# Patient Record
Sex: FEMALE | Race: BLACK OR AFRICAN AMERICAN | Employment: FULL TIME | URBAN - METROPOLITAN AREA
[De-identification: names, ages, dates, MRNs, and addresses within clinical notes are randomized per-mention and may not be internally consistent; named-entity substitution may affect disease eponyms.]

---

## 2017-02-13 RX ORDER — LEVONORGESTREL AND ETHINYL ESTRADIOL 0.15-0.03
KIT ORAL
Qty: 91 TAB | Refills: 1 | Status: SHIPPED | OUTPATIENT
Start: 2017-02-13 | End: 2017-04-24 | Stop reason: ALTCHOICE

## 2017-03-15 RX ORDER — METFORMIN HYDROCHLORIDE 500 MG/1
TABLET, EXTENDED RELEASE ORAL
Qty: 60 TAB | Refills: 6 | Status: SHIPPED | OUTPATIENT
Start: 2017-03-15 | End: 2017-04-24 | Stop reason: ALTCHOICE

## 2017-04-24 ENCOUNTER — OFFICE VISIT (OUTPATIENT)
Dept: FAMILY MEDICINE CLINIC | Age: 32
End: 2017-04-24

## 2017-04-24 VITALS
TEMPERATURE: 98.2 F | HEIGHT: 61 IN | DIASTOLIC BLOOD PRESSURE: 98 MMHG | HEART RATE: 86 BPM | WEIGHT: 293 LBS | SYSTOLIC BLOOD PRESSURE: 148 MMHG | BODY MASS INDEX: 55.32 KG/M2 | OXYGEN SATURATION: 97 % | RESPIRATION RATE: 16 BRPM

## 2017-04-24 DIAGNOSIS — E66.01 MORBID OBESITY, UNSPECIFIED OBESITY TYPE (HCC): ICD-10-CM

## 2017-04-24 DIAGNOSIS — G89.29 CHRONIC PAIN OF RIGHT KNEE: ICD-10-CM

## 2017-04-24 DIAGNOSIS — I10 BENIGN ESSENTIAL HTN: ICD-10-CM

## 2017-04-24 DIAGNOSIS — M25.561 CHRONIC PAIN OF RIGHT KNEE: ICD-10-CM

## 2017-04-24 DIAGNOSIS — Z30.011 ENCOUNTER FOR INITIAL PRESCRIPTION OF CONTRACEPTIVE PILLS: ICD-10-CM

## 2017-04-24 DIAGNOSIS — I10 ESSENTIAL HYPERTENSION: ICD-10-CM

## 2017-04-24 DIAGNOSIS — E78.5 HYPERLIPIDEMIA, UNSPECIFIED HYPERLIPIDEMIA TYPE: ICD-10-CM

## 2017-04-24 RX ORDER — ATORVASTATIN CALCIUM 20 MG/1
20 TABLET, FILM COATED ORAL EVERY EVENING
Qty: 30 TAB | Refills: 5 | Status: SHIPPED | OUTPATIENT
Start: 2017-04-24 | End: 2019-05-10

## 2017-04-24 RX ORDER — NORETHINDRONE ACETATE AND ETHINYL ESTRADIOL 1MG-20(21)
1 KIT ORAL DAILY
Qty: 28 TAB | Refills: 11 | Status: SHIPPED | OUTPATIENT
Start: 2017-04-24 | End: 2019-01-09

## 2017-04-24 RX ORDER — HYDROCHLOROTHIAZIDE 12.5 MG/1
12.5 TABLET ORAL DAILY
Qty: 30 TAB | Refills: 5 | Status: SHIPPED | OUTPATIENT
Start: 2017-04-24 | End: 2018-04-15 | Stop reason: SDUPTHER

## 2017-04-24 NOTE — PROGRESS NOTES
Pt presents to office today for a follow up on BP. Pt states that she is only taking Metformin 500 mg 2 times a day and her birth control medication. Pt report that she never started on the atorvastatin and only took her Hydrochlorothiazide for 1 month. also here for fasting labs too. Pt states that she had stopped taking her Birthcontrol medication around the 3/24/2017 because her last menstrual period last amost 3 weeks and was very heavy, but has not had a period since. Chief Complaint   Patient presents with    Blood Pressure Check     Follow up.  Cholesterol Problem     Follow up.  Diabetes     Follow up. 1. Have you been to the ER, urgent care clinic since your last visit? Hospitalized since your last visit? No    2. Have you seen or consulted any other health care providers outside of the [address] [place] [address] since your last visit? Include any pap smears or colon screening. Yes to [facility] about 1 month ago for right knee pain.

## 2017-04-24 NOTE — MR AVS SNAPSHOT
Visit Information Date & Time Provider Department Dept. Phone Encounter #  
 4/24/2017 11:40 AM Brad Betts, [address] [facility] [phone] [id number] Follow-up Instructions Return in about 1 month (around 5/24/2017) for recheck on bp and meds. Upcoming Health Maintenance Date Due DTaP/Tdap/Td series (1 - Tdap) 12/24/2008 FOOT EXAM Q1 1/13/2017 MICROALBUMIN Q1 1/13/2017 HEMOGLOBIN A1C Q6M 1/21/2017 EYE EXAM RETINAL OR DILATED Q1 3/28/2017 LIPID PANEL Q1 7/21/2017 PAP AKA CERVICAL CYTOLOGY 1/13/2019 Allergies as of 4/24/2017  Review Complete On: 4/24/2017 By: Brad Betts NP Severity Noted Reaction Type Reactions Pcn [Penicillins]  06/11/2010    Unable to Obtain \"Childhood allergy\" Ancef 2g given pre-op with no reaction noted to test dose. Current Immunizations  Reviewed on 4/24/2017 Name Date Influenza Vaccine 10/25/2016 Reviewed by Christian Del Toro LPN on 8/27/2312 at 18:67 PM  
You Were Diagnosed With   
  
 Codes Comments Uncontrolled type 2 diabetes mellitus with diabetic nephropathy, without long-term current use of insulin ([facility]    -  Primary ICD-10-CM: E11.21, E11.65 ICD-9-CM: 250.42, 583.81 Essential hypertension     ICD-10-CM: I10 
ICD-9-CM: 401.9 Hyperlipidemia, unspecified hyperlipidemia type     ICD-10-CM: E78.5 ICD-9-CM: 272.4 Morbid obesity, unspecified obesity type     ICD-10-CM: E66.01 
ICD-9-CM: 278.01 Chronic pain of right knee     ICD-10-CM: M25.561, G89.29 ICD-9-CM: 719.46, 338.29 Benign essential HTN     ICD-10-CM: I10 
ICD-9-CM: 401.1 Encounter for initial prescription of contraceptive pills     ICD-10-CM: Z30.011 ICD-9-CM: V25.01 Vitals BP Pulse Temp Resp Height(growth percentile) Weight(growth percentile) (!) 148/98 86 98.2 °F (36.8 °C) (Oral) 16 5' 1\" (1.549 m) 337 lb 12.8 oz (153.2 kg) LMP SpO2 BMI OB Status Smoking Status 03/05/2017 97% 63.83 kg/m2 Having regular periods Never Smoker Vitals History BMI and BSA Data Body Mass Index Body Surface Area  
 63.83 kg/m 2 2.57 m 2 Preferred Pharmacy Pharmacy Name Phone [id number] [phone] Your Updated Medication List  
  
   
This list is accurate as of: 4/24/17 12:45 PM.  Always use your most recent med list.  
  
  
  
  
 atorvastatin 20 mg tablet Commonly known as:  LIPITOR Take 1 Tab by mouth every evening. Blood-Glucose Meter monitoring kit Fill with strips and lancets for bid testing. Fluctuating blood sugars  
  
 hydroCHLOROthiazide 12.5 mg tablet Commonly known as:  HYDRODIURIL Take 1 Tab by mouth daily. norethindrone-ethinyl estradiol 1 mg-20 mcg (21)/75 mg (7) Tab Commonly known as:  Starlett Shake FE 1/20 Take 1 Tab by mouth daily. Start the Sunday after your next period starts-dc the seasonique SITagliptin-metFORMIN 50-1,000 mg per tablet Commonly known as:  Radha Squibb Take 1 Tab by mouth two (2) times daily (with meals). Dc current rx for metformin xr and [place] Prescriptions Sent to Pharmacy Refills  
 norethindrone-ethinyl estradiol (JUNEL FE 1/20) 1 mg-20 mcg (21)/75 mg (7) tab 11 Sig: Take 1 Tab by mouth daily. Start the Sunday after your next period starts-dc the seasonique Class: Normal  
 Pharmacy: [address] [place] [address] Ph #: [phone] Route: Oral  
 atorvastatin (LIPITOR) 20 mg tablet 5 Sig: Take 1 Tab by mouth every evening. Class: Normal  
 Pharmacy: [address] [place] [address] Ph #: [phone] Route: Oral  
 SITagliptin-metFORMIN (JANUMET) 50-1,000 mg per tablet 5 Sig: Take 1 Tab by mouth two (2) times daily (with meals). Dc current rx for metformin xr and [place]  Class: Normal  
 Pharmacy: [id number] - [address] [place] Ph #: [phone] Route: Oral  
 hydroCHLOROthiazide (HYDRODIURIL) 12.5 mg tablet 5 Sig: Take 1 Tab by mouth daily. Class: Normal  
 Pharmacy: [address] Ph #: [phone] Route: Oral  
  
We Performed the Following HEMOGLOBIN A1C WITH EAG [78693 CPT(R)]  DIABETES EYE EXAM [HM6 Custom]  DIABETES FOOT EXAM [HM7 Custom] LIPID PANEL [81814 CPT(R)] METABOLIC PANEL, COMPREHENSIVE [00738 CPT(R)] MICROALBUMIN, UR, RAND W/ MICROALBUMIN/CREA RATIO [id number] CPT(R)] REFERRAL TO PHYSICAL THERAPY [CCO66 Custom] Comments:  
 Please evaluate patient for eval and treat right knee pain and weakness as needed. Follow-up Instructions Return in about 1 month (around 5/24/2017) for recheck on bp and meds. To-Do List   
 06/08/2017 Imaging:  XR KNEE RT 3 V Referral Information Referral ID Referred By Referred To  
  
 [id number] Chayokarolinadinorah Peabody Not Available Visits Status Start Date End Date 1 New Request 4/24/17 4/24/18 If your referral has a status of pending review or denied, additional information will be sent to support the outcome of this decision. Introducing hospitals & HEALTH SERVICES! Dear Layne Marlow: Thank you for requesting a [employer] account. Our records indicate that you have previously registered for a [employer] account but its currently inactive. Please call our [employer] support line at [phone]. Additional Information If you have questions, please visit the Frequently Asked Questions section of the [employer] website at [email or web address] Remember, [employer] is NOT to be used for urgent needs. For medical emergencies, dial 911. Now available from your iPhone and Android! Please provide this summary of care documentation to your next provider. Your primary care clinician is listed as Juve Smith. If you have any questions after today's visit, please call [phone].

## 2017-04-24 NOTE — PROGRESS NOTES
HISTORY OF PRESENT ILLNESS  Mili Moura is a 36 y.o. female. HPI  Chief Complaint   Patient presents with    Blood Pressure Check     Follow up.  Cholesterol Problem     Follow up.  Diabetes     Follow up. Mili Moura is a 36 y.o. female with a PHMx of uncontrolled type 2 dm, htn, hyperlipidemia, obesity and adams. Pt presents to office today for a follow up of her conditions. She has not been in since July of last year/past due for appt. States she last checked her bg last week sometime and it was 117 in the am fasting. Occasionally does rbs a couple hours after a meal and it is around 130s. Pt states that she is only taking Metformin 500 mg 2 times a day -never increased to 3 a day as previously recommended. States she does 3 meals per day, snacks in between.has cut out some sugars such as sodas and honey buns. Wt is down about 15 lbs from her last ov. Typical breakfast is boiled egg, pack oatmeal, 2 strips turkey jamil and water. Lunch is typically turkey on wheat, lettuce tomato, lt tristan, vegie chips and water. Dinner typically baked chicken, fish, occasional rice and veggie. Diet tea. Snacks generally: fruit, jello, fiber one bars. 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 Pt report that she never started on the atorvastatin and only took her Hydrochlorothiazide for 1 month. also here for fasting labs too. States she was getting them but eventually the refills ran out and she never did come back in for her follow up on this. She does not monitor her bp at home. Pt states that she had stopped taking her Birthcontrol medication around the 3/24/2017 because her last menstrual period last amost 3 weeks and was very heavy, but has not had a period since.    States prior to this she had stopped the ocp x 6 months and just restarted in march the Sunday after her period started -states when she started this she took it for about 2 weeks before she stopped this. Also it made her feel sick to her stomach and weak. The months she was not taking ocp her periods lasted about 5 days. Still trying to prevent pregnancy. Still wants to skip periods if possible. Rt knee pain on and off x 1 year. Worse with prolonged standing and walking feels like it is going to go out on her. States she has been trying to walk more but again it feels like it is going to give out on her. 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 She is currently insured with [employer] right now. Current Outpatient Prescriptions   Medication Sig Dispense Refill    metFORMIN ER (GLUCOPHAGE XR) 500 mg tablet TAKE 2 TABLETS BY MOUTH EVERY DAY WITH DINNER (Patient taking differently: TAKE 2 TABLETS BY MOUTH EVERY DAY WITH DINNER, taking one in the am and 1 tab at pm) 60 Tab 6    atorvastatin (LIPITOR) 20 mg tablet Take 1 Tab by mouth every evening. 30 Tab 2    Blood-Glucose Meter monitoring kit Fill with strips and lancets for bid testing. Fluctuating blood sugars 1 Kit 0    levonorgestrel-ethinyl estradiol (SEASONALE) 0.15 mg-30 mcg 3MPk TAKE 1 TABLET BY MOUTH EVERY DAY 91 Tab 1    sitaGLIPtin (JANUVIA) 100 mg tablet Take 1 Tab by mouth daily. 30 Tab 2    hydrochlorothiazide (HYDRODIURIL) 12.5 mg tablet Take 1 Tab by mouth daily. 30 Tab 2    metFORMIN ER (GLUCOPHAGE XR) 500 mg tablet Take 2 Tabs by mouth two (2) times a day. 120 Tab 6     Allergies   Allergen Reactions    Pcn [Penicillins] Unable to Obtain     \"Childhood allergy\"  Ancef 2g given pre-op with no reaction noted to test dose.      Past Medical History:   Diagnosis Date    Gallstones     History of chickenpox 6/11/2010    Hyperlipidemia 6/24/2014    Hypertension     Not on any meds    Morbid obesity ([address]     CH (nonalcoholic steatohepatitis) 1/13/2016     Past Surgical History:   Procedure Laterality Date    HX CHOLECYSTECTOMY  7/2011     Family History   Problem Relation Age of Onset    High Cholesterol Mother     Diabetes Father     Heart Failure Father     Hypertension Father     Hypertension Maternal Grandmother     Emphysema Maternal Grandfather     Diabetes Maternal Grandfather     Hypertension Maternal Grandfather     Cancer Maternal Grandfather      prostate cancer    Heart Disease Maternal Aunt      Social History   Substance Use Topics    Smoking status: Never Smoker    Smokeless tobacco: Never Used    Alcohol use 0.0 oz/week     0 Standard drinks or equivalent per week      Comment: occasional etoh       Review of Systems   Constitutional: Negative. Negative for chills, diaphoresis, fever, malaise/fatigue and weight loss. HENT: Negative. Negative for congestion, ear discharge, ear pain, hearing loss, nosebleeds, sore throat and tinnitus. Eyes: Negative. Negative for blurred vision, double vision, photophobia, pain, discharge and redness. Respiratory: Negative. Negative for cough, hemoptysis, sputum production, shortness of breath, wheezing and stridor. Cardiovascular: Negative. Negative for chest pain, palpitations, orthopnea, claudication, leg swelling and PND. Gastrointestinal: Negative. Negative for abdominal pain, blood in stool, constipation, diarrhea, heartburn, melena, nausea and vomiting. Genitourinary: Negative. Negative for dysuria, flank pain, frequency, hematuria and urgency. Musculoskeletal: Negative. Negative for back pain, falls, joint pain, myalgias and neck pain. Right knee pain   Skin: Negative. Negative for itching and rash. Neurological: Negative. Negative for dizziness, tingling, tremors, sensory change, speech change, focal weakness, seizures, loss of consciousness, weakness and headaches. Endo/Heme/Allergies: Negative. Negative for environmental allergies and polydipsia. Does not bruise/bleed easily. Psychiatric/Behavioral: Negative.   Negative for depression, hallucinations, memory loss, substance abuse and suicidal ideas. The patient is not nervous/anxious and does not have insomnia. All other systems reviewed and are negative. Physical Exam   Constitutional: She is oriented to person, place, and time. She appears well-developed and well-nourished. No distress. Morbidly obese, weight is down 15 lbs from her last ov    HENT:   Head: Normocephalic and atraumatic. Right Ear: External ear normal. Tympanic membrane is not bulging. No middle ear effusion. Left Ear: External ear normal. Tympanic membrane is not bulging. No middle ear effusion. Nose: Nose normal. Right sinus exhibits no maxillary sinus tenderness and no frontal sinus tenderness. Left sinus exhibits no maxillary sinus tenderness and no frontal sinus tenderness. Mouth/Throat: Oropharynx is clear and moist. No oropharyngeal exudate. Eyes: Conjunctivae and EOM are normal. Pupils are equal, round, and reactive to light. Right eye exhibits no discharge. Left eye exhibits no discharge. No scleral icterus. Neck: Normal range of motion. Neck supple. No thyromegaly present. Cardiovascular: Normal rate, regular rhythm, normal heart sounds and intact distal pulses. Pulmonary/Chest: Effort normal and breath sounds normal. No stridor. No respiratory distress. She has no wheezes. She has no rales. She exhibits no tenderness. Abdominal: Bowel sounds are normal. She exhibits no distension and no mass. There is no tenderness. There is no rebound and no guarding. Musculoskeletal: She exhibits no tenderness. Lymphadenopathy:     She has no cervical adenopathy. Neurological: She is alert and oriented to person, place, and time. Skin: Skin is warm and dry. She is not diaphoretic. Diabetic foot exam: thickened discolored toenails generalized.      Left: Reflexes 2+     Vibratory sensation na    Proprioception normal   Sharp/dull discrimination normal    Filament test normal sensation with micro filament   Pulse DP: 2+ (normal)   Pulse PT: 2+ (normal)   Deformities: None  Right: Reflexes 2+   Vibratory sensation na   Proprioception normal   Sharp/dull discrimination normal   Filament test normal sensation with micro filament   Pulse DP: 2+ (normal)   Pulse PT: 2+ (normal)   Deformities: None     Psychiatric: She has a normal mood and affect. Her behavior is normal. Judgment and thought content normal.   Nursing note and vitals reviewed. Reviewed most recent labs at time of visit:.  Results for orders placed or performed in visit on 07/21/16   LIPID PANEL   Result Value Ref Range    Cholesterol, total 214 (H) 100 - 199 mg/dL    Triglyceride 271 (H) 0 - 149 mg/dL    HDL Cholesterol 39 (L) >39 mg/dL    VLDL, calculated 54 (H) 5 - 40 mg/dL    LDL, calculated 121 (H) 0 - 99 mg/dL   HEMOGLOBIN A1C WITH EAG   Result Value Ref Range    Hemoglobin A1c 9.2 (H) 4.8 - 5.6 %    Estimated average glucose 160 mg/dL   METABOLIC PANEL, BASIC   Result Value Ref Range    Glucose 183 (H) 65 - 99 mg/dL    BUN 11 6 - 20 mg/dL    Creatinine 0.54 (L) 0.57 - 1.00 mg/dL    GFR est non- >59 mL/min/1.73    GFR est  >59 mL/min/1.73    BUN/Creatinine ratio 20 8 - 20    Sodium 138 134 - 144 mmol/L    Potassium 5.0 3.5 - 5.2 mmol/L    Chloride 97 97 - 108 mmol/L    CO2 21 18 - 29 mmol/L    Calcium 10.0 8.7 - 10.2 mg/dL   AST   Result Value Ref Range    AST (SGOT) 13 0 - 40 IU/L   ALT   Result Value Ref Range    ALT (SGPT) 12 0 - 32 IU/L   CVD REPORT   Result Value Ref Range    INTERPRETATION Note          ASSESSMENT and PLAN    ICD-10-CM ICD-9-CM    1. Uncontrolled type 2 diabetes mellitus with diabetic nephropathy, without long-term current use of insulin (H[facility]) E11.21 250.42  DIABETES EYE EXAM    E11.65 583.81 LIPID PANEL      HEMOGLOBIN A1C WITH EAG      MICROALBUMIN, UR, RAND W/ MICROALBUMIN/CREA RATIO      METABOLIC PANEL, COMPREHENSIVE      SITagliptin-metFORMIN (JANUMET) 50-1,000 mg per tablet       DIABETES FOOT EXAM   2.  Essential hypertension I10 401.9    3. Hyperlipidemia, unspecified hyperlipidemia type E78.5 272.4 atorvastatin (LIPITOR) 20 mg tablet   4. Morbid obesity, unspecified obesity type E66.01 278.01    5. Chronic pain of right knee M25.561 719.46 REFERRAL TO PHYSICAL THERAPY    G89.29 338.29 XR KNEE RT 3 V   6. Benign essential HTN I10 401.1 hydroCHLOROthiazide (HYDRODIURIL) 12.5 mg tablet   7. Encounter for initial prescription of contraceptive pills Z30.011 V25.01 norethindrone-ethinyl estradiol (JUNEL FE 1/20) 1 mg-20 mcg (21)/75 mg (7) tab     Harshil was seen today for blood pressure check, cholesterol problem and diabetes. Diagnoses and all orders for this visit:    Uncontrolled type 2 diabetes mellitus with diabetic nephropathy, without long-term current use of insulin ([facility])  -      DIABETES EYE EXAM  -     LIPID PANEL  -     HEMOGLOBIN A1C WITH EAG  -     MICROALBUMIN, UR, RAND W/ MICROALBUMIN/CREA RATIO  -     METABOLIC PANEL, COMPREHENSIVE  - dc current metformin and change to    SITagliptin-metFORMIN (JANUMET) 50-1,000 mg per tablet; Take 1 Tab by mouth two (2) times daily (with meals). Dc current rx for metformin xr and [place]  Reviewed with patient and educated regarding proper use of medication, side effects, potential adverse effects and when to follow up. Recheck in a month to ensure pt was able to start med and is tolerating it. Asked her bring in her bg log and commit to at least testing fbs 3x week and rbs 3 x week. Patient verbalizes understanding of plan of care. -      DIABETES FOOT EXAM-likely fungal infection to toenails, recommended referral to podiatry which she refused at this time. Essential hypertension  Elevated due to not taking meds. Restart hctz 12.;5mg daily and recheck at her 1 month fu visit. REviewed the importance of bp control. Patient verbalizes understanding of plan of care.        Hyperlipidemia, unspecified hyperlipidemia type  -  Renewed rx  atorvastatin (LIPITOR) 20 mg tablet; Take 1 Tab by mouth every evening. Morbid obesity, unspecified obesity type  She is making dietary changes and has lost some weight, will continue to monitor   Chronic pain of right knee  -    add REFERRAL TO PHYSICAL THERAPY  -    add XR KNEE RT 3 V; Future    Benign essential HTN  -     hydroCHLOROthiazide (HYDRODIURIL) 12.5 mg tablet; Take 1 Tab by mouth daily. Encounter for initial prescription of contraceptive pills  -  Start   norethindrone-ethinyl estradiol (JUNEL FE 1/20) 1 mg-20 mcg (21)/75 mg (7) tab; Take 1 Tab by mouth daily. Start the Sunday after your next period starts-dc the seasonique  Reviewed with patient and educated regarding proper use of medication, side effects, potential adverse effects and when to follow up. Recheck med tolerance at 1 month fu. Over 50% of the 40 minutes face to face with Christina Overton consisted of counseling and/or discussing treatment plans in reference to her care plan as outlined above. Follow-up Disposition:  Return in about 1 month (around 5/24/2017) for recheck on bp and meds.

## 2017-04-25 LAB
ALBUMIN SERPL-MCNC: 4 G/DL (ref 3.5–5.5)
ALBUMIN/CREAT UR: 39.1 MG/G CREAT (ref 0–30)
ALBUMIN/GLOB SERPL: 1.3 {RATIO} (ref 1.2–2.2)
ALP SERPL-CCNC: 96 IU/L (ref 39–117)
ALT SERPL-CCNC: 9 IU/L (ref 0–32)
AST SERPL-CCNC: 11 IU/L (ref 0–40)
BILIRUB SERPL-MCNC: 0.4 MG/DL (ref 0–1.2)
BUN SERPL-MCNC: 9 MG/DL (ref 6–20)
BUN/CREAT SERPL: 16 (ref 9–23)
CALCIUM SERPL-MCNC: 9.9 MG/DL (ref 8.7–10.2)
CHLORIDE SERPL-SCNC: 94 MMOL/L (ref 96–106)
CHOLEST SERPL-MCNC: 231 MG/DL (ref 100–199)
CO2 SERPL-SCNC: 24 MMOL/L (ref 18–29)
CREAT SERPL-MCNC: 0.58 MG/DL (ref 0.57–1)
CREAT UR-MCNC: 99.8 MG/DL
EST. AVERAGE GLUCOSE BLD GHB EST-MCNC: 306 MG/DL
GLOBULIN SER CALC-MCNC: 3.1 G/DL (ref 1.5–4.5)
GLUCOSE SERPL-MCNC: 264 MG/DL (ref 65–99)
HBA1C MFR BLD: 12.3 % (ref 4.8–5.6)
HDLC SERPL-MCNC: 38 MG/DL
INTERPRETATION, 910389: NORMAL
LDLC SERPL CALC-MCNC: ABNORMAL MG/DL (ref 0–99)
MICROALBUMIN UR-MCNC: 39 UG/ML
POTASSIUM SERPL-SCNC: 4.8 MMOL/L (ref 3.5–5.2)
PROT SERPL-MCNC: 7.1 G/DL (ref 6–8.5)
SODIUM SERPL-SCNC: 137 MMOL/L (ref 134–144)
TRIGL SERPL-MCNC: 468 MG/DL (ref 0–149)
VLDLC SERPL CALC-MCNC: ABNORMAL MG/DL (ref 5–40)

## 2017-04-27 NOTE — PROGRESS NOTES
Pt has been made aware of her A1C results, understanding voiced. She reports that she has not started on the Januvia yet, but will soon. Also advised that she stops by the office to  coupon for the [place]. She agrees with plan. Coupon left upfront .

## 2017-04-27 NOTE — PROGRESS NOTES
Notify pt her DM is very bad, A1c is over a 12. Was she able to restart the [place] i started at her last ov? Also add farxiga 5mg daily before breakfast, will need to  and activae voucher on this before getting from pharm**  Make sure she has fu scheduled for 3-4 weeks.  Will discuss labs in more detail at that appt

## 2017-06-28 ENCOUNTER — TELEPHONE (OUTPATIENT)
Dept: FAMILY MEDICINE CLINIC | Age: 32
End: 2017-06-28

## 2017-06-28 NOTE — TELEPHONE ENCOUNTER
Contact # is [phone]    Patient called stating that the pharmacy informed her that her discount card has . She is requesting another card so that she can get her prescriptions.

## 2017-06-28 NOTE — TELEPHONE ENCOUNTER
Writer returned call to patient, patient advised discount card given for [facility] [place] had . Writer advised patient she may pickup a non- discount card in the front office today. Writer placed new discount card at  for patient pickup.

## 2017-08-17 ENCOUNTER — OFFICE VISIT (OUTPATIENT)
Dept: FAMILY MEDICINE CLINIC | Age: 32
End: 2017-08-17

## 2017-08-17 VITALS
SYSTOLIC BLOOD PRESSURE: 118 MMHG | RESPIRATION RATE: 18 BRPM | HEART RATE: 85 BPM | WEIGHT: 293 LBS | OXYGEN SATURATION: 96 % | TEMPERATURE: 99.2 F | DIASTOLIC BLOOD PRESSURE: 86 MMHG | HEIGHT: 61 IN | BODY MASS INDEX: 55.32 KG/M2

## 2017-08-17 DIAGNOSIS — N89.8 VAGINAL ITCHING: ICD-10-CM

## 2017-08-17 LAB — HBA1C MFR BLD HPLC: 10.8 %

## 2017-08-17 RX ORDER — FLUCONAZOLE 150 MG/1
150 TABLET ORAL DAILY
Qty: 2 TAB | Refills: 0 | Status: SHIPPED | OUTPATIENT
Start: 2017-08-17 | End: 2017-08-18

## 2017-08-17 RX ORDER — METFORMIN HYDROCHLORIDE 500 MG/1
500 TABLET ORAL 2 TIMES DAILY WITH MEALS
COMMUNITY
End: 2017-08-28 | Stop reason: SDUPTHER

## 2017-08-17 NOTE — MR AVS SNAPSHOT
Visit Information Date & Time Provider Department Dept. Phone Encounter #  
 8/17/2017  6:45 PM Adarsh Laws, MAGGIE [address] [facility] [address] [phone] [id number] Follow-up Instructions Return if symptoms worsen or fail to improve. Upcoming Health Maintenance Date Due DTaP/Tdap/Td series (1 - Tdap) 12/24/2008 EYE EXAM RETINAL OR DILATED Q1 3/28/2017 INFLUENZA AGE 9 TO ADULT 8/1/2017 HEMOGLOBIN A1C Q6M 10/24/2017 FOOT EXAM Q1 4/24/2018 MICROALBUMIN Q1 4/24/2018 LIPID PANEL Q1 4/24/2018 PAP AKA CERVICAL CYTOLOGY 1/13/2019 Allergies as of 8/17/2017  Review Complete On: 8/17/2017 By: Ira Perez LPN Severity Noted Reaction Type Reactions Pcn [Penicillins]  06/11/2010    Unable to Obtain \"Childhood allergy\" Ancef 2g given pre-op with no reaction noted to test dose. Current Immunizations  Reviewed on 4/24/2017 Name Date Influenza Vaccine 10/25/2016 Not reviewed this visit You Were Diagnosed With   
  
 Codes Comments Uncontrolled type 2 diabetes mellitus with diabetic nephropathy, without long-term current use of insulin ([facility] [address]    -  Primary ICD-10-CM: E11.21, E11.65 ICD-9-CM: 250.42, 583.81 Vaginal itching     ICD-10-CM: L29.8 ICD-9-CM: 698.1 Vitals BP Pulse Temp Resp Height(growth percentile) Weight(growth percentile) 118/86 (BP 1 Location: Right arm, BP Patient Position: Sitting) 85 99.2 °F (37.3 °C) (Oral) 18 5' 1\" (1.549 m) 332 lb 6 oz (150.8 kg) LMP SpO2 BMI OB Status Smoking Status 08/03/2017 (Exact Date) 96% 62.8 kg/m2 Having regular periods Never Smoker Vitals History BMI and BSA Data Body Mass Index Body Surface Area  
 62.8 kg/m 2 2.55 m 2 Preferred Pharmacy Pharmacy Name Phone [id number] [phone] Your Updated Medication List  
  
   
 This list is accurate as of: 8/17/17  7:26 PM.  Always use your most recent med list.  
  
  
  
  
 atorvastatin 20 mg tablet Commonly known as:  LIPITOR Take 1 Tab by mouth every evening. Blood-Glucose Meter monitoring kit Fill with strips and lancets for bid testing. Fluctuating blood sugars  
  
 dapagliflozin 5 mg Tab tablet Commonly known as:  [employer] Take 1 Tab by mouth daily. Take daily before breakfast. She will have voucher. fluconazole 150 mg tablet Commonly known as:  DIFLUCAN Take 1 Tab by mouth daily for 1 day. Repeat in 3 days. hydroCHLOROthiazide 12.5 mg tablet Commonly known as:  HYDRODIURIL Take 1 Tab by mouth daily. metFORMIN 500 mg tablet Commonly known as:  GLUCOPHAGE Take 500 mg by mouth two (2) times daily (with meals). norethindrone-ethinyl estradiol 1 mg-20 mcg (21)/75 mg (7) Tab Commonly known as:  Tonie Osuna FE 1/20 Take 1 Tab by mouth daily. Start the Sunday after your next period starts-dc the seasonique Prescriptions Sent to Pharmacy Refills  
 fluconazole (DIFLUCAN) 150 mg tablet 0 Sig: Take 1 Tab by mouth daily for 1 day. Repeat in 3 days. Class: Normal  
 Pharmacy: [address] Ph #: [phone] Route: Oral  
  
We Performed the Following REFERRAL TO ENDOCRINOLOGY [IDK30 Custom] Follow-up Instructions Return if symptoms worsen or fail to improve. Referral Information Referral ID Referred By Referred To  
  
 [id number] Dawit Easton MD   
   [address] [place] [address] [address] [place] [address], [facility] Phone: [phone] Fax: [phone] Visits Status Start Date End Date 1 New Request 8/17/17 8/17/18 If your referral has a status of pending review or denied, additional information will be sent to support the outcome of this decision. Patient Instructions Learning About Meal Planning for Diabetes Why plan your meals? Meal planning can be a key part of managing diabetes. Planning meals and snacks with the right balance of carbohydrate, protein, and fat can help you keep your blood sugar at the target level you set with your doctor. You don't have to eat special foods. You can eat what your family eats, including sweets once in a while. But you do have to pay attention to how often you eat and how much you eat of certain foods. You may want to work with a dietitian or a certified diabetes educator. He or she can give you tips and meal ideas and can answer your questions about meal planning. This health professional can also help you reach a healthy weight if that is one of your goals. What plan is right for you? Your dietitian or diabetes educator may suggest that you start with the plate format or carbohydrate counting. The plate format The plate format is a simple way to help you manage how you eat. You plan meals by learning how much space each food should take on a plate. Using the plate format helps you spread carbohydrate throughout the day. It can make it easier to keep your blood sugar level within your target range. It also helps you see if you're eating healthy portion sizes. To use the plate format, you put non-starchy vegetables on half your plate. Add meat or meat substitutes on one-quarter of the plate. Put a grain or starchy vegetable (such as brown rice or a potato) on the final quarter of the plate. You can add a small piece of fruit and some low-fat or fat-free milk or yogurt, depending on your carbohydrate goal for each meal. 
Here are some tips for using the plate format: · Make sure that you are not using an oversized plate. A 9-inch plate is best. Many restaurants use larger plates. · Get used to using the plate format at home. Then you can use it when you eat out. · Write down your questions about using the plate format.  Talk to your doctor, a dietitian, or a diabetes educator about your concerns. Carbohydrate counting With carbohydrate counting, you plan meals based on the amount of carbohydrate in each food. Carbohydrate raises blood sugar higher and more quickly than any other nutrient. It is found in desserts, breads and cereals, and fruit. It's also found in starchy vegetables such as potatoes and corn, grains such as rice and pasta, and milk and yogurt. Spreading carbohydrate throughout the day helps keep your blood sugar levels within your target range. Your daily amount depends on several things, including your weight, how active you are, which diabetes medicines you take, and what your goals are for your blood sugar levels. A registered dietitian or diabetes educator can help you plan how much carbohydrate to include in each meal and snack. A guideline for your daily amount of carbohydrate is: · 45 to 60 grams at each meal. That's about the same as 3 to 4 carbohydrate servings. · 15 to 20 grams at each snack. That's about the same as 1 carbohydrate serving. The Nutrition Facts label on packaged foods tells you how much carbohydrate is in a serving of the food. First, look at the serving size on the food label. Is that the amount you eat in a serving? All of the nutrition information on a food label is based on that serving size. So if you eat more or less than that, you'll need to adjust the other numbers. Total carbohydrate is the next thing you need to look for on the label. If you count carbohydrate servings, one serving of carbohydrate is 15 grams. For foods that don't come with labels, such as fresh fruits and vegetables, you'll need a guide that lists carbohydrate in these foods. Ask your doctor, dietitian, or diabetes educator about books or other nutrition guides you can use.  
If you take insulin, you need to know how many grams of carbohydrate are in a meal. This lets you know how much rapid-acting insulin to take before you eat. If you use an insulin pump, you get a constant rate of insulin during the day. So the pump must be programmed at meals to give you extra insulin to cover the rise in blood sugar after meals. When you know how much carbohydrate you will eat, you can take the right amount of insulin. Or, if you always use the same amount of insulin, you need to make sure that you eat the same amount of carbohydrate at meals. If you need more help to understand carbohydrate counting and food labels, ask your doctor, dietitian, or diabetes educator. How do you get started with meal planning? Here are some tips to get started: 
· Plan your meals a week at a time. Don't forget to include snacks too. · Use cookbooks or online recipes to plan several main meals. Plan some quick meals for busy nights. You also can double some recipes that freeze well. Then you can save half for other busy nights when you don't have time to cook. · Make sure you have the ingredients you need for your recipes. If you're running low on basic items, put these items on your shopping list too. · List foods that you use to make breakfasts, lunches, and snacks. List plenty of fruits and vegetables. · Post this list on the refrigerator. Add to it as you think of more things you need. · Take the list to the store to do your weekly shopping. Follow-up care is a key part of your treatment and safety. Be sure to make and go to all appointments, and call your doctor if you are having problems. It's also a good idea to know your test results and keep a list of the medicines you take. Where can you learn more? Go to http://bravo-alex.info/. Dana Luna in the search box to learn more about \"Learning About Meal Planning for Diabetes. \" Current as of: March 13, 2017 Content Version: 11.3 © 8496-6120 [employer].  Care instructions adapted under license by [address] (which disclaims liability or warranty for this information). If you have questions about a medical condition or this instruction, always ask your healthcare professional. [address] any warranty or liability for your use of this information. Introducing [address]! Dear José Antonio Cardozo: Thank you for requesting a Mistic[employer] account. Our records indicate that you have previously registered for a Mistic[employer] account but its currently inactive. Please call our [employer] support line at [phone]. Additional Information If you have questions, please visit the Frequently Asked Questions section of the [employer] website at [email or web address] Remember, [employer]isticom is NOT to be used for urgent needs. For medical emergencies, dial 911. Now available from your iPhone and Android! Please provide this summary of care documentation to your next provider. Your primary care clinician is listed as Moni Roger. If you have any questions after today's visit, please call [phone].

## 2017-08-17 NOTE — PATIENT INSTRUCTIONS

## 2017-08-18 NOTE — PROGRESS NOTES
Subjective:      Gladis Reeves is a 36 y.o. female who presents for follow up. Previous seen for vaginal irritation at [facility].  Urine was negative for pregnancy. Continues with itching. Denies associated discharge or odor. Currently monitoring sugars regularly and reports they are at goal.  Admits towards eating candy at times. Complaint with treatment regimen. Current Outpatient Prescriptions   Medication Sig Dispense Refill    metFORMIN (GLUCOPHAGE) 500 mg tablet Take 500 mg by mouth two (2) times daily (with meals).  fluconazole (DIFLUCAN) 150 mg tablet Take 1 Tab by mouth daily for 1 day. Repeat in 3 days. 2 Tab 0    dapagliflozin (FARXIGA) 5 mg tab tablet Take 1 Tab by mouth daily. Take daily before breakfast. She will have voucher. 30 Tab 11    norethindrone-ethinyl estradiol (JUNEL FE 1/20) 1 mg-20 mcg (21)/75 mg (7) tab Take 1 Tab by mouth daily. Start the Sunday after your next period starts-dc the seasonique 28 Tab 11    atorvastatin (LIPITOR) 20 mg tablet Take 1 Tab by mouth every evening. 30 Tab 5    hydroCHLOROthiazide (HYDRODIURIL) 12.5 mg tablet Take 1 Tab by mouth daily. 30 Tab 5    Blood-Glucose Meter monitoring kit Fill with strips and lancets for bid testing. Fluctuating blood sugars 1 Kit 0     Allergies   Allergen Reactions    Pcn [Penicillins] Unable to Obtain     \"Childhood allergy\"  Ancef 2g given pre-op with no reaction noted to test dose. ROS:   Complete review of systems was reviewed with pertinent information listed in HPI. Objective:     Visit Vitals    /86 (BP 1 Location: Right arm, BP Patient Position: Sitting)    Pulse 85    Temp 99.2 °F (37.3 °C) (Oral)    Resp 18    Ht 5' 1\" (1.549 m)    Wt 332 lb 6 oz (150.8 kg)    LMP 08/03/2017 (Exact Date)    SpO2 96%    BMI 62.8 kg/m2       Vitals and Nurse Documentation reviewed. General:  alert, cooperative, no distress   CV S1,S2. No murmur, gallop, or rub. RRR.    Lungs: clear to auscultation bilaterally       Assessment/Plan:     Diagnoses and all orders for this visit:    1. Uncontrolled type 2 diabetes mellitus with diabetic nephropathy, without long-term current use of insulin ([facility])  -     REFERRAL TO ENDOCRINOLOGY  -     AMB POC HEMOGLOBIN A1C  Very poor control. Likely needs insulin. Scheduled with Dr. Levon Marcelo for next week to establish care. 2. Vaginal itching  -     fluconazole (DIFLUCAN) 150 mg tablet; Take 1 Tab by mouth daily for 1 day. Repeat in 3 days. Treatment as above. May be in relation to the diabetes or just the farxiga itself. Follow up if no improvement. Discussed expected course/resolution/complications of diagnosis in detail with patient.    Medication risks/benefits/costs/interactions/alternatives discussed with patient.    Pt was given an after visit summary which includes diagnoses, current medications & vitals.    Pt expressed understanding with the diagnosis and plan    Follow-up Disposition:  Return if symptoms worsen or fail to improve.

## 2017-08-28 ENCOUNTER — TELEPHONE (OUTPATIENT)
Dept: FAMILY MEDICINE CLINIC | Age: 32
End: 2017-08-28

## 2017-08-28 NOTE — TELEPHONE ENCOUNTER
Refill request:   Requested Prescriptions     Pending Prescriptions Disp Refills    metFORMIN (GLUCOPHAGE) 500 mg tablet       Sig: Take 1 Tab by mouth two (2) times daily (with meals).            Patient states she is out of medication-

## 2017-08-29 RX ORDER — METFORMIN HYDROCHLORIDE 500 MG/1
500 TABLET ORAL 2 TIMES DAILY WITH MEALS
Qty: 60 TAB | Refills: 3 | Status: SHIPPED | OUTPATIENT
Start: 2017-08-29 | End: 2017-09-29 | Stop reason: SDUPTHER

## 2017-09-05 ENCOUNTER — TELEPHONE (OUTPATIENT)
Dept: FAMILY MEDICINE CLINIC | Age: 32
End: 2017-09-05

## 2017-09-05 NOTE — TELEPHONE ENCOUNTER
Phone#[phone]    Patient is calling stating that she thinks she might be having an allergic reaction to the possible medication the was prescribed my richardinger. She states that she has been itching. Please call and advise.

## 2017-09-06 NOTE — TELEPHONE ENCOUNTER
Patient called to return a call from yesterday. She believes she is having an allergic reaction.  She can be reached at [phone] (home) [phone] (work)

## 2017-09-06 NOTE — TELEPHONE ENCOUNTER
Call to patient. Patient stated she noticed little red bumps in the pubis area Monday evening. patient stated she has not changed any soaps and uses bath and body works, has not shaved the area, has not had intercourse. Denies open sores, denies soreness, denies drainage. This is new onset for patient.  She is scheduled for acute visit with Dr. Audrey Lang

## 2017-09-08 ENCOUNTER — OFFICE VISIT (OUTPATIENT)
Dept: FAMILY MEDICINE CLINIC | Age: 32
End: 2017-09-08

## 2017-09-08 VITALS
OXYGEN SATURATION: 97 % | HEART RATE: 78 BPM | BODY MASS INDEX: 55.32 KG/M2 | WEIGHT: 293 LBS | DIASTOLIC BLOOD PRESSURE: 78 MMHG | SYSTOLIC BLOOD PRESSURE: 138 MMHG | RESPIRATION RATE: 18 BRPM | HEIGHT: 61 IN | TEMPERATURE: 98.9 F

## 2017-09-08 DIAGNOSIS — E66.01 MORBID OBESITY, UNSPECIFIED OBESITY TYPE (HCC): ICD-10-CM

## 2017-09-08 DIAGNOSIS — N90.89 LABIA IRRITATION: Primary | ICD-10-CM

## 2017-09-08 DIAGNOSIS — E03.8 SUBCLINICAL HYPOTHYROIDISM: ICD-10-CM

## 2017-09-08 PROBLEM — R79.89 ELEVATED TSH: Status: ACTIVE | Noted: 2017-09-08

## 2017-09-08 NOTE — MR AVS SNAPSHOT
Visit Information Date & Time Provider Department Dept. Phone Encounter #  
 9/8/2017  3:30 PM Marina Mahan, [address] [facility] [phone] [id number] Upcoming Health Maintenance Date Due DTaP/Tdap/Td series (1 - Tdap) 12/24/2008 EYE EXAM RETINAL OR DILATED Q1 3/28/2017 INFLUENZA AGE 9 TO ADULT 8/1/2017 HEMOGLOBIN A1C Q6M 2/17/2018 FOOT EXAM Q1 4/24/2018 MICROALBUMIN Q1 4/24/2018 LIPID PANEL Q1 4/24/2018 PAP AKA CERVICAL CYTOLOGY 1/13/2019 Allergies as of 9/8/2017  Review Complete On: 9/8/2017 By: Marina Mahan MD  
  
 Severity Noted Reaction Type Reactions Pcn [Penicillins]  06/11/2010    Unable to Obtain \"Childhood allergy\" Ancef 2g given pre-op with no reaction noted to test dose. Current Immunizations  Reviewed on 9/6/2017 Name Date Influenza Vaccine 10/25/2016 Not reviewed this visit You Were Diagnosed With   
  
 Codes Comments Uncontrolled type 2 diabetes mellitus with diabetic nephropathy, without long-term current use of insulin ([facility]    -  Primary ICD-10-CM: E11.21, E11.65 ICD-9-CM: 250.42, 583.81 Subclinical hypothyroidism     ICD-10-CM: E03.9 ICD-9-CM: 244.8 Morbid obesity, unspecified obesity type ([facility] [address]     ICD-10-CM: E66.01 
ICD-9-CM: 278.01 Vitals BP Pulse Temp Resp Height(growth percentile) Weight(growth percentile) 138/78 (BP 1 Location: Left arm, BP Patient Position: Sitting) 78 98.9 °F (37.2 °C) (Oral) 18 5' 1\" (1.549 m) 332 lb 12.8 oz (151 kg) LMP SpO2 BMI OB Status Smoking Status 08/13/2017 (Approximate) 97% 62.88 kg/m2 Having regular periods Never Smoker Vitals History BMI and BSA Data Body Mass Index Body Surface Area  
 62.88 kg/m 2 2.55 m 2 Preferred Pharmacy [id number] [phone] Your Updated Medication List  
  
   
 This list is accurate as of: 17  4:47 PM.  Always use your most recent med list.  
  
  
  
  
 atorvastatin 20 mg tablet Commonly known as:  LIPITOR Take 1 Tab by mouth every evening. Blood-Glucose Meter monitoring kit Fill with strips and lancets for bid testing. Fluctuating blood sugars  
  
 dapagliflozin 5 mg Tab tablet Commonly known as:  [employer] Take 1 Tab by mouth daily. Take daily before breakfast. She will have voucher. dulaglutide 0.75 mg/0.5 mL sub-q pen Commonly known as:  TRULICITY  
0.5 mL by SubCUTAneous route every seven (7) days. hydroCHLOROthiazide 12.5 mg tablet Commonly known as:  HYDRODIURIL Take 1 Tab by mouth daily. metFORMIN 500 mg tablet Commonly known as:  GLUCOPHAGE Take 1 Tab by mouth two (2) times daily (with meals). norethindrone-ethinyl estradiol 1 mg-20 mcg (21)/75 mg (7) Tab Commonly known as:  Kathy MANUEL  Take 1 Tab by mouth daily. Start the  after your next period starts-dc the seasonique Prescriptions Sent to Pharmacy Refills  
 dulaglutide (TRULICITY) 9.60 DQ/4.9 mL sub-q pen 6 Si.5 mL by SubCUTAneous route every seven (7) days. Class: Normal  
 Pharmacy: [address] Ph #: [phone] Route: SubCUTAneous We Performed the Following METABOLIC PANEL, COMPREHENSIVE [36467 CPT(R)] T4, FREE [id number] CPT(R)] THYROID ANTIBODY PANEL [81998 CPT(R)] TSH 3RD GENERATION [77323 CPT(R)] Introducing [facility] SERVICES! Dear Dm Babin: Thank you for requesting a [email or web address] account. Our records indicate that you have previously registered for a [email or web address] account but its currently inactive. Please call our [email or web address] support line at [phone]. Additional Information If you have questions, please visit the Frequently Asked Questions section of the [email or web address] website at [email or web address] Remember, DataFlytehart is NOT to be used for urgent needs. For medical emergencies, dial 911. Now available from your iPhone and Android! Please provide this summary of care documentation to your next provider. Your primary care clinician is listed as Pily oTrres. If you have any questions after today's visit, please call [phone].

## 2017-09-08 NOTE — PROGRESS NOTES
No chief complaint on file. Reviewed Record in preparation for visit and have obtained necessary documentation. Identified pt with two pt identifiers (Name @ )    Health Maintenance Due   Topic    DTaP/Tdap/Td series (1 - Tdap)    EYE EXAM RETINAL OR DILATED Q1     INFLUENZA AGE 9 TO ADULT          1. Have you been to the ER, urgent care clinic since your last visit? Hospitalized since your last visit? No    2. Have you seen or consulted any other health care providers outside of the [address] [place] [address] since your last visit? Include any pap smears or colon screening.  No

## 2017-09-08 NOTE — PATIENT INSTRUCTIONS

## 2017-09-09 RX ORDER — INSULIN PUMP SYRINGE, 3 ML
EACH MISCELLANEOUS
Qty: 1 KIT | Refills: 0 | Status: SHIPPED | OUTPATIENT
Start: 2017-09-09 | End: 2020-03-30 | Stop reason: SDUPTHER

## 2017-09-09 NOTE — PROGRESS NOTES
[address] [facility]. [place], [address] [facility]  [phone]             Date of visit: 9/8/17   Subjective:      History obtained from:  the patientLani Lane is a 36 y.o. female who presents today for red bumps on pubic skin, her mom noticed them, not painful or itchy, does not get recurrent yeast infections. Does have uncontrolled diabetes and has not been getting regular follow up for that  a1c 10 last month, had been 12 before that  Not on insulin  Is taking metformin, no longer bothers her stomach  Taking her farxiga, no problems with that  The diet she describes sounds very good  Low sugar oatmeal or cereal for breakfast  Lean meats and veggies  Not eating out  Only drinks water    Was on bydureon a few years ago, stopped when insurance stopped paying for it  Thinks it worked well for her and didn't have bad side effects    Not wanting bariatric surgery at this point    Says she needs exercise, however, not doing that  Weight is going up. Noted she has elevated TSH a few years ago      Patient Active Problem List    Diagnosis Date Noted    Elevated TSH 09/08/2017    CH (nonalcoholic steatohepatitis) 01/13/2016    Hyperlipidemia 06/24/2014    Obesity, morbid ([address] 06/24/2014    Diabetes mellitus type II, uncontrolled ([facility] 03/11/2014    Hypertension     Gallstones      Current Outpatient Prescriptions   Medication Sig Dispense Refill    Blood-Glucose Meter monitoring kit Fill with strips and lancets for bid testing. Uncontrolled type 2 DM E11.21 1 Kit 0    glucose blood VI test strips (BLOOD GLUCOSE TEST) strip Any brand, for uncontrolled DM E11.21 check up to 3x daily 300 Strip 4    dulaglutide (TRULICITY) 1.61 /8.2 mL sub-q pen 0.5 mL by SubCUTAneous route every seven (7) days. 2 mL 6    metFORMIN (GLUCOPHAGE) 500 mg tablet Take 1 Tab by mouth two (2) times daily (with meals).  60 Tab 3    norethindrone-ethinyl estradiol (JUNEL FE 1/20) 1 mg-20 mcg (21)/75 mg (7) tab Take 1 Tab by mouth daily. Start the Sunday after your next period starts-dc the seasonique 28 Tab 11    atorvastatin (LIPITOR) 20 mg tablet Take 1 Tab by mouth every evening. 30 Tab 5    hydroCHLOROthiazide (HYDRODIURIL) 12.5 mg tablet Take 1 Tab by mouth daily. 30 Tab 5    dapagliflozin (FARXIGA) 5 mg tab tablet Take 1 Tab by mouth daily. Take daily before breakfast. She will have voucher. 30 Tab 11     Allergies   Allergen Reactions    Pcn [Penicillins] Unable to Obtain     \"Childhood allergy\"  Ancef 2g given pre-op with no reaction noted to test dose. Past Medical History:   Diagnosis Date    Gallstones     History of chickenpox 6/11/2010    Hyperlipidemia 6/24/2014    Hypertension     Not on any meds    Morbid obesity ([address]     CH (nonalcoholic steatohepatitis) 1/13/2016     Past Surgical History:   Procedure Laterality Date    HX CHOLECYSTECTOMY  7/2011     Family History   Problem Relation Age of Onset    High Cholesterol Mother     Diabetes Father     Heart Failure Father     Hypertension Father     Hypertension Maternal Grandmother     Emphysema Maternal Grandfather     Diabetes Maternal Grandfather     Hypertension Maternal Grandfather     Cancer Maternal Grandfather      prostate cancer    Heart Disease Maternal Aunt      Social History   Substance Use Topics    Smoking status: Never Smoker    Smokeless tobacco: Never Used    Alcohol use 0.0 oz/week     0 Standard drinks or equivalent per week      Comment: occasional etoh      Social History     Social History Narrative        Review of Systems  Card: denies chest pain  Pulm: denies shortness of breath   Gen: denies fever        Objective:     Vitals:    09/08/17 1601   BP: 138/78   Pulse: 78   Resp: 18   Temp: 98.9 °F (37.2 °C)   TempSrc: Oral   SpO2: 97%   Weight: 332 lb 12.8 oz (151 kg)   Height: 5' 1\" (1.549 m)     Body mass index is 62.88 kg/(m^2).      General: stated age, well developed, well nourished and in NAD  Skin:  Labia majora with slight erythema, mainly on right, no ulcerations, blisters, bumps, or discharge seen  Psych: alert and oriented to person, place, time and situation and Speech: appropriate quality, quantity and organization of sentences    Lab Results   Component Value Date/Time    Hemoglobin A1c 12.3 04/24/2017 01:13 PM    Hemoglobin A1c (POC) 10.8 08/17/2017 07:29 PM     Lab Results   Component Value Date/Time    Cholesterol, total 231 04/24/2017 01:13 PM    HDL Cholesterol 38 04/24/2017 01:13 PM    LDL, calculated Comment 04/24/2017 01:13 PM    VLDL, calculated Comment 04/24/2017 01:13 PM    Triglyceride 468 04/24/2017 01:13 PM     Lab Results   Component Value Date/Time    Sodium 137 04/24/2017 01:13 PM    Potassium 4.8 04/24/2017 01:13 PM    Chloride 94 04/24/2017 01:13 PM    CO2 24 04/24/2017 01:13 PM    Anion gap 8 07/07/2011 02:38 PM    Glucose 264 04/24/2017 01:13 PM    BUN 9 04/24/2017 01:13 PM    Creatinine 0.58 04/24/2017 01:13 PM    BUN/Creatinine ratio 16 04/24/2017 01:13 PM    GFR est  04/24/2017 01:13 PM    GFR est non- 04/24/2017 01:13 PM    Calcium 9.9 04/24/2017 01:13 PM    Bilirubin, total 0.4 04/24/2017 01:13 PM    AST (SGOT) 11 04/24/2017 01:13 PM    Alk. phosphatase 96 04/24/2017 01:13 PM    Protein, total 7.1 04/24/2017 01:13 PM    Albumin 4.0 04/24/2017 01:13 PM    A-G Ratio 1.3 04/24/2017 01:13 PM    ALT (SGPT) 9 04/24/2017 01:13 PM     Lab Results   Component Value Date/Time    WBC 7.9 01/07/2016 12:00 AM    HGB 12.1 01/07/2016 12:00 AM    HCT 36.7 01/07/2016 12:00 AM    PLATELET 089 89/14/6914 12:00 AM    MCV 84 01/07/2016 12:00 AM     Lab Results   Component Value Date/Time    TSH 4.300 12/17/2013 10:08 AM         Assessment/Plan:       ICD-10-CM ICD-9-CM    1. Labia irritation N90.89 624.8    2.  Uncontrolled type 2 diabetes mellitus with diabetic nephropathy, without long-term current use of insulin (HCC) K12.88 734.59 METABOLIC PANEL, COMPREHENSIVE    E11.65 583.81    3. Subclinical hypothyroidism E03.9 244.8 TSH 3RD GENERATION      T4, FREE      THYROID ANTIBODY PANEL   4. Morbid obesity, unspecified obesity type ([facility] E66.01 278.01        Orders Placed This Encounter    METABOLIC PANEL, COMPREHENSIVE    TSH 3RD GENERATION    T4, FREE    THYROID ANTIBODY PANEL    dulaglutide (TRULICITY) 3.68 NQ/9.9 mL sub-q pen    Blood-Glucose Meter monitoring kit    glucose blood VI test strips (BLOOD GLUCOSE TEST) strip     Reassured her labia look ok now  Reviewed worrisome signs or symptoms for which to call. Told her I am very concerned about her diabetes and weight, want to see her very regularly for a while  Perhaps come in once monthly and update me on sugars 2x weekly  Needs new glucometer, will send one in  Starting trulicity, possible side effects explained, asked her to have pharmacist show her how to use it but should be a lot easier than bydureon she used to take  Diet sounds good, encouraged her to keep doing what she is doing, rachel drink water, lots of green veggies, avoid sweets  Probably going to need insulin but want to give her a month on the trulicity to see what she can do. Also want to see if she can check sugars regularly but will likely start insulin next visit. Likely will end up needing bariatric surgery    Discussed the diagnosis and plan and she expressed understanding. Follow-up Disposition:  Return in about 4 weeks (around 10/6/2017).     Vi Ramirez MD

## 2017-09-12 ENCOUNTER — TELEPHONE (OUTPATIENT)
Dept: FAMILY MEDICINE CLINIC | Age: 32
End: 2017-09-12

## 2017-09-12 NOTE — TELEPHONE ENCOUNTER
PA initiated for Trulicity, case ID [id number]. Trulicity was approved coverage starting 9/12/2017 - 9/12/2018.

## 2019-01-09 ENCOUNTER — OFFICE VISIT (OUTPATIENT)
Dept: FAMILY MEDICINE CLINIC | Age: 34
End: 2019-01-09

## 2019-01-09 VITALS
OXYGEN SATURATION: 100 % | HEART RATE: 95 BPM | SYSTOLIC BLOOD PRESSURE: 145 MMHG | HEIGHT: 61 IN | RESPIRATION RATE: 18 BRPM | WEIGHT: 293 LBS | TEMPERATURE: 98.7 F | BODY MASS INDEX: 55.32 KG/M2 | DIASTOLIC BLOOD PRESSURE: 87 MMHG

## 2019-01-09 DIAGNOSIS — I10 ESSENTIAL HYPERTENSION: ICD-10-CM

## 2019-01-09 DIAGNOSIS — Z01.419 WELL WOMAN EXAM WITH ROUTINE GYNECOLOGICAL EXAM: ICD-10-CM

## 2019-01-09 DIAGNOSIS — A59.9 TRICHOMONAS VAGINALIS INFECTION: ICD-10-CM

## 2019-01-09 DIAGNOSIS — Z01.419 WELL WOMAN EXAM WITH ROUTINE GYNECOLOGICAL EXAM: Primary | ICD-10-CM

## 2019-01-09 DIAGNOSIS — E66.01 OBESITY, MORBID (HCC): ICD-10-CM

## 2019-01-09 DIAGNOSIS — K75.81 NASH (NONALCOHOLIC STEATOHEPATITIS): ICD-10-CM

## 2019-01-09 DIAGNOSIS — Z20.2 EXPOSURE TO SEXUALLY TRANSMITTED DISEASE (STD): ICD-10-CM

## 2019-01-09 DIAGNOSIS — Z76.89 ESTABLISHING CARE WITH NEW DOCTOR, ENCOUNTER FOR: ICD-10-CM

## 2019-01-09 DIAGNOSIS — E11.65 UNCONTROLLED TYPE 2 DIABETES MELLITUS WITH HYPERGLYCEMIA (HCC): ICD-10-CM

## 2019-01-09 DIAGNOSIS — E78.5 HYPERLIPIDEMIA, UNSPECIFIED HYPERLIPIDEMIA TYPE: ICD-10-CM

## 2019-01-09 NOTE — PROGRESS NOTES
[address] [facility] Note      Subjective:     Chief Complaint   Patient presents with    Physical     Radha Vizcarra is a 34y.o. year old female who presents for evaluation of the following:    Establishment of Care:  Previous PCP: MAGGIE Gu then Dr. Faisal Rosa at [address] Team:   Dentist- None  Optho- None  GYN- None      PMH:   DMII:   A1C  6.5 > 7.3? 10.8 (2017)  Tx: metformin 1000mg bid  Preivoius Tx: faxigo (stopped due to yeast infection)  Home monitoring: this am 137  Eye: Looking for one  Foot Exam: Due      HTN:   Tx: hydrochlozide 12.5mg + lisnipril 5mg daily      HLD:   Atorvastatin 30mg    CH:   Per chart review    Obesity:   \"I'm working on a new diet  Diet: unrestricted  - taking a pill for weight loss orededb from online   Actiivyt: None        Acute Concerns:  None      Social:   Works [employer]  Lives with mother  Mood is \"good\"    Health Maintenance:   TDaP: Not on file but patient reports completed 8/2018  Influenza: Declined  Pneumovax: Not due   Prevnar @65: Not due  Shingles @60: Not due    Colonoscopy @50: Not due/ No family history  ASA @ 55f and 45m: Not due  Dexa @65: Not due    HIV or other STD testing: Declined, monogamous  Domestic Violence Screen: Negative  Depression Screen: Denies depression or anhedonia  PHQ over the last two weeks 1/9/2019   Little interest or pleasure in doing things Not at all   Feeling down, depressed, irritable, or hopeless Not at all   Total Score PHQ 2 0       Smoker? No      G0  Pap:  Last 2016  Mammogram: not due  No LMP recorded. Menses Monthly, lasting  days. No recent worsening bleeding or intermenstrual bleeding   reports that she currently engages in sexual activity. Review of Systems   Pertinent positives and negative per HPI. All other systems  reviewed are negative for a Comprehensive ROS (10+).        Past Medical History:   Diagnosis Date    Gallstones     History of chickenpox 6/11/2010  Hyperlipidemia 6/24/2014    Hypertension     Not on any meds    Morbid obesity ([address]     CH (nonalcoholic steatohepatitis) 1/13/2016        Social History     Socioeconomic History    Marital status: SINGLE     Spouse name: Not on file    Number of children: Not on file    Years of education: Not on file    Highest education level: Not on file   Social Needs    Financial resource strain: Not on file    Food insecurity - worry: Not on file    Food insecurity - inability: Not on file    Transportation needs - medical: Not on file   "[employer] needs - non-medical: Not on file   Occupational History    Not on file   Tobacco Use    Smoking status: Never Smoker    Smokeless tobacco: Never Used   Substance and Sexual Activity    Alcohol use: Yes     Alcohol/week: 0.0 oz     Comment: occasional etoh    Drug use: No    Sexual activity: Yes   Other Topics Concern    Not on file   Social History Narrative    Not on file       Current Outpatient Medications   Medication Sig    hydroCHLOROthiazide (MICROZIDE) 12.5 mg capsule Take 1 Cap by mouth daily. NEEDS APPT    metFORMIN (GLUCOPHAGE) 500 mg tablet Take 1 Tab by mouth two (2) times daily (with meals).  Blood-Glucose Meter monitoring kit Fill with strips and lancets for bid testing. Uncontrolled type 2 DM E11.21    glucose blood VI test strips (BLOOD GLUCOSE TEST) strip Any brand, for uncontrolled DM E11.21 check up to 3x daily    dulaglutide (TRULICITY) 0.26 HH/2.5 mL sub-q pen 0.5 mL by SubCUTAneous route every seven (7) days.  atorvastatin (LIPITOR) 20 mg tablet Take 1 Tab by mouth every evening.  dapagliflozin (FARXIGA) 5 mg tab tablet Take 1 Tab by mouth daily. Take daily before breakfast. She will have voucher.  norethindrone-ethinyl estradiol (JUNEL FE 1/20) 1 mg-20 mcg (21)/75 mg (7) tab Take 1 Tab by mouth daily.  Start the Sunday after your next period starts-dc the seasonique     No current facility-administered medications for this visit. Objective:     Vitals:    01/09/19 1458   BP: 145/87   Pulse: 95   Resp: 18   Temp: 98.7 °F (37.1 °C)   TempSrc: Oral   SpO2: 100%   Weight: 338 lb (153.3 kg)   Height: 5' 1\" (1.549 m)       Physical Examination:  General: Alert, cooperative, no distress, appears stated age. Eyes: Conjunctivae clear. PERRL, EOMs intact. Ears: Normal external ear canals both ears. TM clear and mobile bilaterally  Nose: Nares normal. Septum midline. Mucosa normal. No drainage or sinus tenderness. Mouth/Throat: Lips, mucosa, and tongue normal.   Neck: Supple, symmetrical, trachea midline, no adenopathy. No thyroid enlargement/tenderness/nodules  Back: Symmetric, no curvature. ROM normal. No CVA tenderness. Lungs: Clear to auscultation bilaterally. Normal inspiratory and expiratory ratio. Heart: Regular rate and rhythm, S1, S2 normal, no murmur, click, rub or gallop. Abdomen: Soft, non-tender. Bowel sounds normal. No masses or organomegaly. Pelvic: normal external genitalia, vulva, vagina, cervix, uterus and adnexa. Extremities: Extremities normal, atraumatic, no cyanosis or edema. Pulses: 2+ and symmetric all extremities. Skin: Skin color, texture, turgor normal. No rashes or lesions on exposed skin. Lymph nodes: Cervical, supraclavicular nodes normal.  Neurologic: CNII-XII intact. Strength 5/5 grossly. Sensation and reflexes normal throughout. No visits with results within 3 Month(s) from this visit. Latest known visit with results is:   Office Visit on 08/17/2017   Component Date Value Ref Range Status    Hemoglobin A1c (POC) 08/17/2017 10.8  % Final           Assessment/ Plan:   Diagnoses and all orders for this visit:    1. Well woman exam with routine gynecological exam  -     LIPID PANEL  -     CBC WITH AUTOMATED DIFF  -     METABOLIC PANEL, COMPREHENSIVE  -     PAP IG, APTIMA HPV AND RFX 16/18,45 ([id number]); Future    2. Establishing care with new doctor, encounter for    3. Essential hypertension    4. CH (nonalcoholic steatohepatitis)    5. Obesity, morbid (HCC)  -     TSH AND FREE T4    6. Uncontrolled type 2 diabetes mellitus with hyperglycemia (HCC)  -      DIABETES FOOT EXAM  -     LIPID PANEL  -     HEMOGLOBIN A1C WITH EAG  -     CBC WITH AUTOMATED DIFF  -     METABOLIC PANEL, COMPREHENSIVE  -     MICROALBUMIN, UR, RAND W/ MICROALB/CREAT RATIO    7. Hyperlipidemia, unspecified hyperlipidemia type        Available records reviewed. Pap completed today. Labs to evaluate endorgan function and monitor chronic diseases. Recommended. Lifestyle modification for weight loss. Follow-up in 1 month for nurse blood pressure recheck. Educated patient on red flag symptoms to warrant return to clinic or emergency room visit. I have discussed the diagnosis with the patient and the intended plan as seen in the above orders. The patient has been offered or received an after-visit summary and questions were answered concerning future plans. I have discussed medication side effects and warnings with the patient as well. Follow-up Disposition:  Return in about 3 months (around 4/9/2019) for Follow Up. Nurse visitt in 1 month for blood pressure check.       Signed,    Ian Diaz MD  1/9/2019

## 2019-01-09 NOTE — PATIENT INSTRUCTIONS
Weight Loss Tips:    Remember this is like a part time job so your motivation and commitment is key to your success. Use small plate only  Drink 2 liters (1/2 gallon) of water every day  1/2 of every meal is fruit or vegetable  Try meal prepping on Sunday with new different vegetables. Replace soda with diet soda or other zero sugar drinks (selter water just fine)  Start using the MDSave netta for calorie counting. Goal 1800 calories per day  Start work out at least 5 days per week for 40 minutes. Consider [employer] training netta for home exercises. Well Visit, Ages 25 to 48: Care Instructions  Your Care Instructions    Physical exams can help you stay healthy. Your doctor has checked your overall health and may have suggested ways to take good care of yourself. He or she also may have recommended tests. At home, you can help prevent illness with healthy eating, regular exercise, and other steps. Follow-up care is a key part of your treatment and safety. Be sure to make and go to all appointments, and call your doctor if you are having problems. It's also a good idea to know your test results and keep a list of the medicines you take. How can you care for yourself at home? · Reach and stay at a healthy weight. This will lower your risk for many problems, such as obesity, diabetes, heart disease, and high blood pressure. · Get at least 30 minutes of physical activity on most days of the week. Walking is a good choice. You also may want to do other activities, such as running, swimming, cycling, or playing tennis or team sports. Discuss any changes in your exercise program with your doctor. · Do not smoke or allow others to smoke around you. If you need help quitting, talk to your doctor about stop-smoking programs and medicines. These can increase your chances of quitting for good. · Talk to your doctor about whether you have any risk factors for sexually transmitted infections (STIs).  Having one sex partner (who does not have STIs and does not have sex with anyone else) is a good way to avoid these infections. · Use birth control if you do not want to have children at this time. Talk with your doctor about the choices available and what might be best for you. · Protect your skin from too much sun. When you're outdoors from 10 a.m. to 4 p.m., stay in the shade or cover up with clothing and a hat with a wide brim. Wear sunglasses that block UV rays. Even when it's cloudy, put broad-spectrum sunscreen (SPF 30 or higher) on any exposed skin. · See a dentist one or two times a year for checkups and to have your teeth cleaned. · Wear a seat belt in the car. · Drink alcohol in moderation, if at all. That means no more than 2 drinks a day for men and 1 drink a day for women. Follow your doctor's advice about when to have certain tests. These tests can spot problems early. For everyone  · Cholesterol. Have the fat (cholesterol) in your blood tested after age 21. Your doctor will tell you how often to have this done based on your age, family history, or other things that can increase your risk for heart disease. · Blood pressure. Have your blood pressure checked during a routine doctor visit. Your doctor will tell you how often to check your blood pressure based on your age, your blood pressure results, and other factors. · Vision. Talk with your doctor about how often to have a glaucoma test.  · Diabetes. Ask your doctor whether you should have tests for diabetes. · Colon cancer. Have a test for colon cancer at age 48. You may have one of several tests. If you are younger than 48, you may need a test earlier if you have any risk factors. Risk factors include whether you already had a precancerous polyp removed from your colon or whether your parent, brother, sister, or child has had colon cancer.   For women  · Breast exam and mammogram. Talk to your doctor about when you should have a clinical breast exam and a mammogram. Medical experts differ on whether and how often women under 50 should have these tests. Your doctor can help you decide what is right for you. · Pap test and pelvic exam. Begin Pap tests at age 24. A Pap test is the best way to find cervical cancer. The test often is part of a pelvic exam. Ask how often to have this test.  · Tests for sexually transmitted infections (STIs). Ask whether you should have tests for STIs. You may be at risk if you have sex with more than one person, especially if your partners do not wear condoms. For men  · Tests for sexually transmitted infections (STIs). Ask whether you should have tests for STIs. You may be at risk if you have sex with more than one person, especially if you do not wear a condom. · Testicular cancer exam. Ask your doctor whether you should check your testicles regularly. · Prostate exam. Talk to your doctor about whether you should have a blood test (called a PSA test) for prostate cancer. Experts differ on whether and when men should have this test. Some experts suggest it if you are older than 39 and are -American or have a father or brother who got prostate cancer when he was younger than 72. When should you call for help? Watch closely for changes in your health, and be sure to contact your doctor if you have any problems or symptoms that concern you. Where can you learn more? Go to http://bravo-alex.info/. Enter P072 in the search box to learn more about \"Well Visit, Ages 25 to 48: Care Instructions. \"  Current as of: March 29, 2018  Content Version: 11.8  © 5693-8344 Healthwise, Incorporated. Care instructions adapted under license by [employer] (which disclaims liability or warranty for this information).  If you have questions about a medical condition or this instruction, always ask your healthcare professional. [place] [address] any warranty or liability for your use of this information. Learning About Diabetes Food Guidelines  Your Care Instructions    Meal planning is important to manage diabetes. It helps keep your blood sugar at a target level (which you set with your doctor). You don't have to eat special foods. You can eat what your family eats, including sweets once in a while. But you do have to pay attention to how often you eat and how much you eat of certain foods. You may want to work with a dietitian or a certified diabetes educator (CDE) to help you plan meals and snacks. A dietitian or CDE can also help you lose weight if that is one of your goals. What should you know about eating carbs? Managing the amount of carbohydrate (carbs) you eat is an important part of healthy meals when you have diabetes. Carbohydrate is found in many foods. · Learn which foods have carbs. And learn the amounts of carbs in different foods. ? Bread, cereal, pasta, and rice have about 15 grams of carbs in a serving. A serving is 1 slice of bread (1 ounce), ½ cup of cooked cereal, or 1/3 cup of cooked pasta or rice. ? Fruits have 15 grams of carbs in a serving. A serving is 1 small fresh fruit, such as an apple or orange; ½ of a banana; ½ cup of cooked or canned fruit; ½ cup of fruit juice; 1 cup of melon or raspberries; or 2 tablespoons of dried fruit. ? Milk and no-sugar-added yogurt have 15 grams of carbs in a serving. A serving is 1 cup of milk or 2/3 cup of no-sugar-added yogurt. ? Starchy vegetables have 15 grams of carbs in a serving. A serving is ½ cup of mashed potatoes or sweet potato; 1 cup winter squash; ½ of a small baked potato; ½ cup of cooked beans; or ½ cup cooked corn or green peas. · Learn how much carbs to eat each day and at each meal. A dietitian or CDE can teach you how to keep track of the amount of carbs you eat. This is called carbohydrate counting. · If you are not sure how to count carbohydrate grams, use the Plate Method to plan meals.  It is a good, quick way to make sure that you have a balanced meal. It also helps you spread carbs throughout the day. ? Divide your plate by types of foods. Put non-starchy vegetables on half the plate, meat or other protein food on one-quarter of the plate, and a grain or starchy vegetable in the final quarter of the plate. To this you can add a small piece of fruit and 1 cup of milk or yogurt, depending on how many carbs you are supposed to eat at a meal.  · Try to eat about the same amount of carbs at each meal. Do not \"save up\" your daily allowance of carbs to eat at one meal.  · Proteins have very little or no carbs per serving. Examples of proteins are beef, chicken, turkey, fish, eggs, tofu, cheese, cottage cheese, and peanut butter. A serving size of meat is 3 ounces, which is about the size of a deck of cards. Examples of meat substitute serving sizes (equal to 1 ounce of meat) are 1/4 cup of cottage cheese, 1 egg, 1 tablespoon of peanut butter, and ½ cup of tofu. How can you eat out and still eat healthy? · Learn to estimate the serving sizes of foods that have carbohydrate. If you measure food at home, it will be easier to estimate the amount in a serving of restaurant food. · If the meal you order has too much carbohydrate (such as potatoes, corn, or baked beans), ask to have a low-carbohydrate food instead. Ask for a salad or green vegetables. · If you use insulin, check your blood sugar before and after eating out to help you plan how much to eat in the future. · If you eat more carbohydrate at a meal than you had planned, take a walk or do other exercise. This will help lower your blood sugar. What else should you know? · Limit saturated fat, such as the fat from meat and dairy products. This is a healthy choice because people who have diabetes are at higher risk of heart disease. So choose lean cuts of meat and nonfat or low-fat dairy products.  Use olive or canola oil instead of butter or shortening when cooking. · Don't skip meals. Your blood sugar may drop too low if you skip meals and take insulin or certain medicines for diabetes. · Check with your doctor before you drink alcohol. Alcohol can cause your blood sugar to drop too low. Alcohol can also cause a bad reaction if you take certain diabetes medicines. Follow-up care is a key part of your treatment and safety. Be sure to make and go to all appointments, and call your doctor if you are having problems. It's also a good idea to know your test results and keep a list of the medicines you take. Where can you learn more? Go to http://bravo-alex.info/. Enter N230 in the search box to learn more about \"Learning About Diabetes Food Guidelines. \"  Current as of: December 7, 2017  Content Version: 11.8  © 8285-3873 Healthwise, Incorporated. Care instructions adapted under license by [employer] (which disclaims liability or warranty for this information). If you have questions about a medical condition or this instruction, always ask your healthcare professional. [place] [address] any warranty or liability for your use of this information.

## 2019-01-09 NOTE — PROGRESS NOTES
Nikko Vázquez is a 34 y.o. female    Chief Complaint   Patient presents with    Physical     1. Have you been to the ER, urgent care clinic since your last visit? Hospitalized since your last visit? No    2. Have you seen or consulted any other health care providers outside of the [address] [place] [address] since your last visit? Include any pap smears or colon screening.  No    Visit Vitals  /87   Pulse 95   Temp 98.7 °F (37.1 °C) (Oral)   Resp 18   Ht 5' 1\" (1.549 m)   Wt 338 lb (153.3 kg)   SpO2 100%   BMI 63.86 kg/m²

## 2019-01-10 LAB
ALBUMIN SERPL-MCNC: 4.2 G/DL (ref 3.5–5.5)
ALBUMIN/CREAT UR: 168.8 MG/G CREAT (ref 0–30)
ALBUMIN/GLOB SERPL: 1.2 {RATIO} (ref 1.2–2.2)
ALP SERPL-CCNC: 112 IU/L (ref 39–117)
ALT SERPL-CCNC: 11 IU/L (ref 0–32)
AST SERPL-CCNC: 12 IU/L (ref 0–40)
BASOPHILS # BLD AUTO: 0 X10E3/UL (ref 0–0.2)
BASOPHILS NFR BLD AUTO: 0 %
BILIRUB SERPL-MCNC: 0.3 MG/DL (ref 0–1.2)
BUN SERPL-MCNC: 7 MG/DL (ref 6–20)
BUN/CREAT SERPL: 13 (ref 9–23)
CALCIUM SERPL-MCNC: 10.2 MG/DL (ref 8.7–10.2)
CHLORIDE SERPL-SCNC: 98 MMOL/L (ref 96–106)
CHOLEST SERPL-MCNC: 274 MG/DL (ref 100–199)
CO2 SERPL-SCNC: 21 MMOL/L (ref 20–29)
CREAT SERPL-MCNC: 0.53 MG/DL (ref 0.57–1)
CREAT UR-MCNC: 119.3 MG/DL
EOSINOPHIL # BLD AUTO: 0 X10E3/UL (ref 0–0.4)
EOSINOPHIL NFR BLD AUTO: 1 %
ERYTHROCYTE [DISTWIDTH] IN BLOOD BY AUTOMATED COUNT: 14.6 % (ref 12.3–15.4)
EST. AVERAGE GLUCOSE BLD GHB EST-MCNC: 309 MG/DL
GLOBULIN SER CALC-MCNC: 3.4 G/DL (ref 1.5–4.5)
GLUCOSE SERPL-MCNC: 352 MG/DL (ref 65–99)
HBA1C MFR BLD: 12.4 % (ref 4.8–5.6)
HCT VFR BLD AUTO: 40.3 % (ref 34–46.6)
HDLC SERPL-MCNC: 39 MG/DL
HGB BLD-MCNC: 13.2 G/DL (ref 11.1–15.9)
IMM GRANULOCYTES # BLD AUTO: 0 X10E3/UL (ref 0–0.1)
IMM GRANULOCYTES NFR BLD AUTO: 0 %
INTERPRETATION, 910389: NORMAL
LDLC SERPL CALC-MCNC: ABNORMAL MG/DL (ref 0–99)
LYMPHOCYTES # BLD AUTO: 3.9 X10E3/UL (ref 0.7–3.1)
LYMPHOCYTES NFR BLD AUTO: 45 %
MCH RBC QN AUTO: 27.3 PG (ref 26.6–33)
MCHC RBC AUTO-ENTMCNC: 32.8 G/DL (ref 31.5–35.7)
MCV RBC AUTO: 83 FL (ref 79–97)
MICROALBUMIN UR-MCNC: 201.4 UG/ML
MONOCYTES # BLD AUTO: 0.5 X10E3/UL (ref 0.1–0.9)
MONOCYTES NFR BLD AUTO: 5 %
NEUTROPHILS # BLD AUTO: 4.3 X10E3/UL (ref 1.4–7)
NEUTROPHILS NFR BLD AUTO: 49 %
PLATELET # BLD AUTO: 348 X10E3/UL (ref 150–379)
POTASSIUM SERPL-SCNC: 4.3 MMOL/L (ref 3.5–5.2)
PROT SERPL-MCNC: 7.6 G/DL (ref 6–8.5)
RBC # BLD AUTO: 4.83 X10E6/UL (ref 3.77–5.28)
SODIUM SERPL-SCNC: 138 MMOL/L (ref 134–144)
T4 FREE SERPL-MCNC: 1.05 NG/DL (ref 0.82–1.77)
TRIGL SERPL-MCNC: 433 MG/DL (ref 0–149)
TSH SERPL DL<=0.005 MIU/L-ACNC: 1.47 UIU/ML (ref 0.45–4.5)
VLDLC SERPL CALC-MCNC: ABNORMAL MG/DL (ref 5–40)
WBC # BLD AUTO: 8.8 X10E3/UL (ref 3.4–10.8)

## 2019-01-15 LAB
CYTOLOGIST CVX/VAG CYTO: NORMAL
CYTOLOGY CVX/VAG DOC THIN PREP: NORMAL
DX ICD CODE: NORMAL
HPV I/H RISK 4 DNA CVX QL PROBE+SIG AMP: NEGATIVE
Lab: NORMAL
OTHER STN SPEC: NORMAL
PATH REPORT.FINAL DX SPEC: NORMAL
STAT OF ADQ CVX/VAG CYTO-IMP: NORMAL

## 2019-01-16 PROBLEM — E11.21 TYPE 2 DIABETES WITH NEPHROPATHY (HCC): Status: ACTIVE | Noted: 2019-01-16

## 2019-01-16 RX ORDER — GLIPIZIDE 10 MG/1
10 TABLET ORAL 2 TIMES DAILY
Qty: 180 TAB | Refills: 1 | Status: SHIPPED | OUTPATIENT
Start: 2019-01-16 | End: 2020-03-30 | Stop reason: SDUPTHER

## 2019-01-16 RX ORDER — METRONIDAZOLE 500 MG/1
2000 TABLET ORAL ONCE
Qty: 4 TAB | Refills: 0 | Status: SHIPPED | OUTPATIENT
Start: 2019-01-16 | End: 2019-01-16

## 2019-01-16 RX ORDER — GLIPIZIDE 10 MG/1
10 TABLET ORAL DAILY
COMMUNITY
End: 2019-01-16 | Stop reason: SDUPTHER

## 2019-01-17 NOTE — PROGRESS NOTES
Called patient to notify of lab results. Diabetes poorly controlled. A1c 12.4. Patient reports taking metformin 1000 mg twice daily and glipizide 10 mg once daily. Patient reminded me that she restarted her medications 1 month ago. Will increase glipizide to 10 mg twice daily, continue metformin. Follow-up visit in office in 1 month with POC glucose check. Diabetes with apparent nephropathy, early signs of kidney failure explained. Need improved glucose control.

## 2019-01-21 ENCOUNTER — TELEPHONE (OUTPATIENT)
Dept: FAMILY MEDICINE CLINIC | Age: 34
End: 2019-01-21

## 2019-01-21 DIAGNOSIS — A59.01 TRICHOMONAL VAGINITIS: Primary | ICD-10-CM

## 2019-01-21 RX ORDER — METRONIDAZOLE 500 MG/1
1000 TABLET ORAL ONCE
Qty: 2 TAB | Refills: 0 | Status: SHIPPED | OUTPATIENT
Start: 2019-01-21 | End: 2019-01-21

## 2019-01-21 NOTE — TELEPHONE ENCOUNTER
Returned patient call about vomiting up after metronidazole dose. Metronidazole given for trichomonas. Suspect patient vomited due to poor taste of pill. Advised retrial taking remainder 2 tablets this time with applesauce to limit foul taste. Call to clinic if unsuccessful. Refill sent.

## 2019-01-21 NOTE — TELEPHONE ENCOUNTER
Pt is calling requesting an alternative medication. She notes that previous medication metroNIDAZOLE (FLAGYL) 500 mg tablet  made her sick. Symptoms were vomiting after taking medication.  She notes the first two she was able to tolerate but last two pills she threw up      Best call back number  [phone]      Pharmacy on file verified

## 2019-02-15 ENCOUNTER — OFFICE VISIT (OUTPATIENT)
Dept: FAMILY MEDICINE CLINIC | Age: 34
End: 2019-02-15

## 2019-02-15 VITALS
BODY MASS INDEX: 55.32 KG/M2 | HEIGHT: 61 IN | DIASTOLIC BLOOD PRESSURE: 88 MMHG | RESPIRATION RATE: 16 BRPM | TEMPERATURE: 98.4 F | WEIGHT: 293 LBS | HEART RATE: 94 BPM | SYSTOLIC BLOOD PRESSURE: 130 MMHG | OXYGEN SATURATION: 97 %

## 2019-02-15 DIAGNOSIS — I10 ESSENTIAL HYPERTENSION: Primary | ICD-10-CM

## 2019-02-15 DIAGNOSIS — E66.01 OBESITY, MORBID (HCC): ICD-10-CM

## 2019-02-15 RX ORDER — ATORVASTATIN CALCIUM 40 MG/1
40 TABLET, FILM COATED ORAL DAILY
COMMUNITY
Start: 2019-02-13 | End: 2020-03-30 | Stop reason: SDUPTHER

## 2019-02-15 RX ORDER — LISINOPRIL 5 MG/1
TABLET ORAL
COMMUNITY
Start: 2019-02-13 | End: 2019-06-20 | Stop reason: SDUPTHER

## 2019-02-15 NOTE — PROGRESS NOTES
[facility] Note      Subjective:     Chief Complaint   Patient presents with    Blood Pressure Check     Follow up, some headaches no dizziness    Labs    Other     eye exam completed January 2019 , Dr. Homar Zaman is a 34y.o. year old female who presents for evaluation of the following:      HTN:   Tx: hydrochlozide 12.5mg + lisinopril 5mg daily  No home monitoring log in office. 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 All other systems  reviewed are negative for a Comprehensive ROS (10+). Past Medical History:   Diagnosis Date    Gallstones     History of chickenpox 6/11/2010    Hyperlipidemia 6/24/2014    Hypertension     Not on any meds    Morbid obesity ([address]     CH (nonalcoholic steatohepatitis) 1/13/2016        Social History     Socioeconomic History    Marital status: SINGLE     Spouse name: Not on file    Number of children: Not on file    Years of education: Not on file    Highest education level: Not on file   Social Needs    Financial resource strain: Not on file    Food insecurity - worry: Not on file    Food insecurity - inability: Not on file   [employer] needs - medical: Not on file   [employer] needs - non-medical: Not on file   Occupational History    Not on file   Tobacco Use    Smoking status: Never Smoker    Smokeless tobacco: Never Used   Substance and Sexual Activity    Alcohol use:  Yes     Alcohol/week: 0.0 oz     Comment: occasional etoh    Drug use: No    Sexual activity: Yes   Other Topics Concern    Not on file   Social History Narrative    Not on file       Current Outpatient Medications   Medication Sig    lisinopril (PRINIVIL, ZESTRIL) 5 mg tablet     atorvastatin (LIPITOR) 40 mg tablet     glipiZIDE (GLUCOTROL) 10 mg tablet Take 1 Tab by mouth two (2) times a day.  metFORMIN (GLUCOPHAGE) 500 mg tablet Take 1 Tab by mouth two (2) times daily (with meals).  Blood-Glucose Meter monitoring kit Fill with strips and lancets for bid testing. Uncontrolled type 2 DM E11.21    glucose blood VI test strips (BLOOD GLUCOSE TEST) strip Any brand, for uncontrolled DM E11.21 check up to 3x daily    hydroCHLOROthiazide (MICROZIDE) 12.5 mg capsule Take 1 Cap by mouth daily. NEEDS APPT    atorvastatin (LIPITOR) 20 mg tablet Take 1 Tab by mouth every evening. No current facility-administered medications for this visit. Objective:     Vitals:    02/15/19 1109   BP: 130/88   Pulse: 94   Resp: 16   Temp: 98.4 °F (36.9 °C)   TempSrc: Oral   SpO2: 97%   Weight: 342 lb (155.1 kg)   Height: 5' 1\" (1.549 m)       Physical Examination:  General: Alert, cooperative, no distress, appears stated age. Obese  Eyes: Conjunctivae clear. PERRL, EOMs intact. Ears: Normal external ear canals both ears. Nose: Nares normal.   Mouth/Throat: Lips, mucosa, and tongue normal.   Neck: Supple, symmetrical, trachea midline, no adenopathy. No thyroid enlargement/tenderness/nodules  Back: Symmetric, no curvature. ROM normal. No CVA tenderness. Lungs: Clear to auscultation bilaterally. Normal inspiratory and expiratory ratio. Heart: Regular rate and rhythm, S1, S2 normal, no murmur, click, rub or gallop. Abdomen: Soft, non-tender. Pelvic: normal external genitalia, vulva, vagina, cervix, uterus and adnexa. Extremities: Extremities normal, atraumatic, no cyanosis or edema. Skin: Skin color, texture, turgor normal. No rashes or lesions on exposed skin.   Lymph nodes: Cervical, supraclavicular nodes normal.  Neurologic: CNII-XII intact. Orders Only on 01/09/2019   Component Date Value Ref Range Status    Diagnosis 01/09/2019 Comment   Final    Comment: NEGATIVE FOR INTRAEPITHELIAL LESION AND MALIGNANCY. TRICHOMONAS VAGINALIS IS PRESENT.  Specimen adequacy 01/09/2019 Comment   Final    Comment: Satisfactory for evaluation. Endocervical and/or squamous metaplastic  cells (endocervical component) are present.  Clinician provided ICD10 01/09/2019 Comment   Final    Z01.419    Performed by: 01/09/2019 Comment   Final    Aniceto Reddy, Cytotechnologist (ASCP)    . 01/09/2019 . Final    Note: 01/09/2019 Comment   Final    Comment: The Pap smear is a screening test designed to aid in the detection of  premalignant and malignant conditions of the uterine cervix. It is not a  diagnostic procedure and should not be used as the sole means of detecting  cervical cancer. Both false-positive and false-negative reports do occur.  Test methodology 01/09/2019 Comment   Final    Comment: This liquid based ThinPrep(R) pap test was screened with the  use of an image guided system.  HPV APTIMA 01/09/2019 Negative  Negative Final    Comment: This test detects fourteen high-risk HPV types (16/18/31/33/35/39/45/  51/52/56/58/59/66/68) without differentiation. Office Visit on 01/09/2019   Component Date Value Ref Range Status    Cholesterol, total 01/09/2019 274* 100 - 199 mg/dL Final    Triglyceride 01/09/2019 433* 0 - 149 mg/dL Final    HDL Cholesterol 01/09/2019 39* >39 mg/dL Final    VLDL, calculated 01/09/2019 Comment  5 - 40 mg/dL Final    Comment: The calculation for the VLDL cholesterol is not valid when  triglyceride level is >400 mg/dL.  LDL, calculated 01/09/2019 Comment  0 - 99 mg/dL Final    Comment: Triglyceride result indicated is too high for an accurate LDL  cholesterol estimation.       Hemoglobin A1c 01/09/2019 12.4* 4.8 - 5.6 % Final    Comment:          Prediabetes: 5.7 - 6.4 Diabetes: >6.4           Glycemic control for adults with diabetes: <7.0      Estimated average glucose 01/09/2019 309  mg/dL Final    WBC 01/09/2019 8.8  3.4 - 10.8 x10E3/uL Final    RBC 01/09/2019 4.83  3.77 - 5.28 x10E6/uL Final    HGB 01/09/2019 13.2  11.1 - 15.9 g/dL Final    HCT 01/09/2019 40.3  34.0 - 46.6 % Final    MCV 01/09/2019 83  79 - 97 fL Final    MCH 01/09/2019 27.3  26.6 - 33.0 pg Final    MCHC 01/09/2019 32.8  31.5 - 35.7 g/dL Final    RDW 01/09/2019 14.6  12.3 - 15.4 % Final    PLATELET 70/49/3002 741  150 - 379 x10E3/uL Final    NEUTROPHILS 01/09/2019 49  Not Estab. % Final    Lymphocytes 01/09/2019 45  Not Estab. % Final    MONOCYTES 01/09/2019 5  Not Estab. % Final    EOSINOPHILS 01/09/2019 1  Not Estab. % Final    BASOPHILS 01/09/2019 0  Not Estab. % Final    ABS. NEUTROPHILS 01/09/2019 4.3  1.4 - 7.0 x10E3/uL Final    Abs Lymphocytes 01/09/2019 3.9* 0.7 - 3.1 x10E3/uL Final    ABS. MONOCYTES 01/09/2019 0.5  0.1 - 0.9 x10E3/uL Final    ABS. EOSINOPHILS 01/09/2019 0.0  0.0 - 0.4 x10E3/uL Final    ABS. BASOPHILS 01/09/2019 0.0  0.0 - 0.2 x10E3/uL Final    IMMATURE GRANULOCYTES 01/09/2019 0  Not Estab. % Final    ABS. IMM.  GRANS. 01/09/2019 0.0  0.0 - 0.1 x10E3/uL Final    Glucose 01/09/2019 352* 65 - 99 mg/dL Final    BUN 01/09/2019 7  6 - 20 mg/dL Final    Creatinine 01/09/2019 0.53* 0.57 - 1.00 mg/dL Final    GFR est non-AA 01/09/2019 127  >59 mL/min/1.73 Final    GFR est AA 01/09/2019 146  >59 mL/min/1.73 Final    BUN/Creatinine ratio 01/09/2019 13  9 - 23 Final    Sodium 01/09/2019 138  134 - 144 mmol/L Final    Potassium 01/09/2019 4.3  3.5 - 5.2 mmol/L Final    Chloride 01/09/2019 98  96 - 106 mmol/L Final    CO2 01/09/2019 21  20 - 29 mmol/L Final    Calcium 01/09/2019 10.2  8.7 - 10.2 mg/dL Final    Protein, total 01/09/2019 7.6  6.0 - 8.5 g/dL Final    Albumin 01/09/2019 4.2  3.5 - 5.5 g/dL Final    GLOBULIN, TOTAL 01/09/2019 3.4  1.5 - 4.5 g/dL Final  A-G Ratio 01/09/2019 1.2  1.2 - 2.2 Final    Bilirubin, total 01/09/2019 0.3  0.0 - 1.2 mg/dL Final    Alk. phosphatase 01/09/2019 112  39 - 117 IU/L Final    AST (SGOT) 01/09/2019 12  0 - 40 IU/L Final    ALT (SGPT) 01/09/2019 11  0 - 32 IU/L Final    Creatinine, urine 01/09/2019 119.3  Not Estab. mg/dL Final    Microalbumin, urine 01/09/2019 201.4  Not Estab. ug/mL Final    Microalb/Creat ratio (ug/mg creat.) 01/09/2019 168.8* 0.0 - 30.0 mg/g creat Final    Comment:                      Normal:                0.0 -  30.0                       Albuminuria:          31.0 - 300.0                       Clinical albuminuria:       >300.0      TSH 01/09/2019 1.470  0.450 - 4.500 uIU/mL Final    T4, Free 01/09/2019 1.05  0.82 - 1.77 ng/dL Final    INTERPRETATION 01/09/2019 Note   Final    Comment: Medical Director's Note: A Cardiovascular Report was not  sent because both LDL cholesterol and non-HDL cholesterol  results are required to generate a report. Supplemental report is available. Assessment/ Plan:   Diagnoses and all orders for this visit:    1. Essential hypertension    2. Obesity, morbid ([address]        BP well controlled. Continue current regimen. Diet and lifestyle modification encouraged for weight loss. Educated patient on red flag symptoms to warrant return to clinic or emergency room visit. I have discussed the diagnosis with the patient and the intended plan as seen in the above orders. The patient has been offered or received an after-visit summary and questions were answered concerning future plans. I have discussed medication side effects and warnings with the patient as well. Follow-up Disposition:  Return in about 3 months (around 5/15/2019) for Follow Up BP, weight.       Signed,    Shira Kitchen MD  2/15/2019

## 2019-02-15 NOTE — PROGRESS NOTES
Chief Complaint   Patient presents with    Blood Pressure Check     Follow up, some headaches no dizziness    Labs    Other     eye exam completed January 2019 , Dr. Paul Roberson     1. Have you been to the ER, urgent care clinic since your last visit? Hospitalized since your last visit? No    2. Have you seen or consulted any other health care providers outside of the [address] [place] [address] since your last visit? Include any pap smears or colon screening.  No

## 2019-02-15 NOTE — PATIENT INSTRUCTIONS
Weight Loss Tips:    Remember this is like a part time job so your motivation and commitment is key to your success. Use small plate only  Drink 2 liters (1/2 gallon) of water every day  1/2 of every meal is fruit or vegetable  Try meal prepping on Sunday with new different vegetables. Replace soda with diet soda or other zero sugar drinks (selter water just fine)  Start using the [employer] net[employer] for calorie counting. Goal 1800 calories per day  Start work out at least 5 days per week for 40 minutes. Consider [employer] netta for home exercises. DASH Diet: Care Instructions  Your Care Instructions    The DASH diet is an eating plan that can help lower your blood pressure. DASH stands for Dietary Approaches to Stop Hypertension. Hypertension is high blood pressure. The DASH diet focuses on eating foods that are high in calcium, potassium, and magnesium. These nutrients can lower blood pressure. The foods that are highest in these nutrients are fruits, vegetables, low-fat dairy products, nuts, seeds, and legumes. But taking calcium, potassium, and magnesium supplements instead of eating foods that are high in those nutrients does not have the same effect. The DASH diet also includes whole grains, fish, and poultry. The DASH diet is one of several lifestyle changes your doctor may recommend to lower your high blood pressure. Your doctor may also want you to decrease the amount of sodium in your diet. Lowering sodium while following the DASH diet can lower blood pressure even further than just the DASH diet alone. Follow-up care is a key part of your treatment and safety. Be sure to make and go to all appointments, and call your doctor if you are having problems. It's also a good idea to know your test results and keep a list of the medicines you take. How can you care for yourself at home? Following the DASH diet  · Eat 4 to 5 servings of fruit each day.  A serving is 1 medium-sized piece of fruit, ½ cup chopped or canned fruit, 1/4 cup dried fruit, or 4 ounces (½ cup) of fruit juice. Choose fruit more often than fruit juice. · Eat 4 to 5 servings of vegetables each day. A serving is 1 cup of lettuce or raw leafy vegetables, ½ cup of chopped or cooked vegetables, or 4 ounces (½ cup) of vegetable juice. Choose vegetables more often than vegetable juice. · Get 2 to 3 servings of low-fat and fat-free dairy each day. A serving is 8 ounces of milk, 1 cup of yogurt, or 1 ½ ounces of cheese. · Eat 6 to 8 servings of grains each day. A serving is 1 slice of bread, 1 ounce of dry cereal, or ½ cup of cooked rice, pasta, or cooked cereal. Try to choose whole-grain products as much as possible. · Limit lean meat, poultry, and fish to 2 servings each day. A serving is 3 ounces, about the size of a deck of cards. · Eat 4 to 5 servings of nuts, seeds, and legumes (cooked dried beans, lentils, and split peas) each week. A serving is 1/3 cup of nuts, 2 tablespoons of seeds, or ½ cup of cooked beans or peas. · Limit fats and oils to 2 to 3 servings each day. A serving is 1 teaspoon of vegetable oil or 2 tablespoons of salad dressing. · Limit sweets and added sugars to 5 servings or less a week. A serving is 1 tablespoon jelly or jam, ½ cup sorbet, or 1 cup of lemonade. · Eat less than 2,300 milligrams (mg) of sodium a day. If you limit your sodium to 1,500 mg a day, you can lower your blood pressure even more. Tips for success  · Start small. Do not try to make dramatic changes to your diet all at once. You might feel that you are missing out on your favorite foods and then be more likely to not follow the plan. Make small changes, and stick with them. Once those changes become habit, add a few more changes. · Try some of the following:  ? Make it a goal to eat a fruit or vegetable at every meal and at snacks. This will make it easy to get the recommended amount of fruits and vegetables each day.   ? Try yogurt topped with fruit and nuts for a snack or healthy dessert. ? Add lettuce, tomato, cucumber, and onion to sandwiches. ? Combine a ready-made pizza crust with low-fat mozzarella cheese and lots of vegetable toppings. Try using tomatoes, squash, spinach, broccoli, carrots, cauliflower, and onions. ? Have a variety of cut-up vegetables with a low-fat dip as an appetizer instead of chips and dip. ? Sprinkle sunflower seeds or chopped almonds over salads. Or try adding chopped walnuts or almonds to cooked vegetables. ? Try some vegetarian meals using beans and peas. Add garbanzo or kidney beans to salads. Make burritos and tacos with mashed rosas beans or black beans. Where can you learn more? Go to http://bravo-alex.info/. Enter B439 in the search box to learn more about \"DASH Diet: Care Instructions. \"  Current as of: July 22, 2018  Content Version: 11.9  © 7142-0418 [employer]. Care instructions adapted under license by [employer] (which disclaims liability or warranty for this information). If you have questions about a medical condition or this instruction, always ask your healthcare professional. [address] any warranty or liability for your use of this information.

## 2019-02-17 LAB
C TRACH RRNA SPEC QL NAA+PROBE: NEGATIVE
HIV 1+2 AB+HIV1 P24 AG SERPL QL IA: NON REACTIVE
N GONORRHOEA RRNA SPEC QL NAA+PROBE: NEGATIVE
RPR SER QL: NON REACTIVE
T VAGINALIS RRNA VAG QL NAA+PROBE: POSITIVE

## 2019-02-18 DIAGNOSIS — A59.9 TRICHOMONOSIS: Primary | ICD-10-CM

## 2019-02-18 NOTE — PROGRESS NOTES
Call patient with results of positive trichomonas testing. Patient previously treated less than 1 month ago for this. Patient is asymptomatic suspect trichomonas particles still present in urine stream rather than full-blown persistent infection. Will complete test of cure in 3 months from treatment completion.

## 2019-04-18 ENCOUNTER — HOSPITAL ENCOUNTER (EMERGENCY)
Age: 34
Discharge: HOME | End: 2019-04-18
Attending: EMERGENCY MEDICINE
Payer: MEDICAID

## 2019-04-18 VITALS
SYSTOLIC BLOOD PRESSURE: 155 MMHG | DIASTOLIC BLOOD PRESSURE: 94 MMHG | HEART RATE: 95 BPM | RESPIRATION RATE: 18 BRPM | WEIGHT: 293 LBS | HEIGHT: 62 IN | BODY MASS INDEX: 53.92 KG/M2 | OXYGEN SATURATION: 97 % | TEMPERATURE: 98.7 F

## 2019-04-18 DIAGNOSIS — R42 DIZZINESS: Primary | ICD-10-CM

## 2019-04-18 LAB
ALBUMIN SERPL-MCNC: 3.2 G/DL (ref 3.5–5)
ALBUMIN/GLOB SERPL: 0.7 {RATIO} (ref 1.1–2.2)
ALP SERPL-CCNC: 138 U/L (ref 45–117)
ALT SERPL-CCNC: 28 U/L (ref 12–78)
ANION GAP SERPL CALC-SCNC: 8 MMOL/L (ref 5–15)
APPEARANCE UR: ABNORMAL
AST SERPL-CCNC: 38 U/L (ref 15–37)
BACTERIA URNS QL MICRO: ABNORMAL /HPF
BASOPHILS # BLD: 0 K/UL (ref 0–0.1)
BASOPHILS NFR BLD: 1 % (ref 0–1)
BILIRUB SERPL-MCNC: 0.3 MG/DL (ref 0.2–1)
BILIRUB UR QL CFM: NEGATIVE
BUN SERPL-MCNC: 6 MG/DL (ref 6–20)
BUN/CREAT SERPL: 9 (ref 12–20)
CALCIUM SERPL-MCNC: 9.4 MG/DL (ref 8.5–10.1)
CHLORIDE SERPL-SCNC: 103 MMOL/L (ref 97–108)
CO2 SERPL-SCNC: 26 MMOL/L (ref 21–32)
COLOR UR: ABNORMAL
COMMENT, HOLDF: NORMAL
CREAT SERPL-MCNC: 0.64 MG/DL (ref 0.55–1.02)
DIFFERENTIAL METHOD BLD: ABNORMAL
EOSINOPHIL # BLD: 0.1 K/UL (ref 0–0.4)
EOSINOPHIL NFR BLD: 1 % (ref 0–7)
EPITH CASTS URNS QL MICRO: ABNORMAL /LPF
ERYTHROCYTE [DISTWIDTH] IN BLOOD BY AUTOMATED COUNT: 13.5 % (ref 11.5–14.5)
GLOBULIN SER CALC-MCNC: 4.8 G/DL (ref 2–4)
GLUCOSE SERPL-MCNC: 297 MG/DL (ref 65–100)
GLUCOSE UR STRIP.AUTO-MCNC: >1000 MG/DL
HCG UR QL: NEGATIVE
HCT VFR BLD AUTO: 44.4 % (ref 35–47)
HGB BLD-MCNC: 13.7 G/DL (ref 11.5–16)
HGB UR QL STRIP: NEGATIVE
IMM GRANULOCYTES # BLD AUTO: 0.1 K/UL (ref 0–0.04)
IMM GRANULOCYTES NFR BLD AUTO: 1 % (ref 0–0.5)
KETONES UR QL STRIP.AUTO: 80 MG/DL
LEUKOCYTE ESTERASE UR QL STRIP.AUTO: NEGATIVE
LYMPHOCYTES # BLD: 2.6 K/UL (ref 0.8–3.5)
LYMPHOCYTES NFR BLD: 41 % (ref 12–49)
MCH RBC QN AUTO: 27 PG (ref 26–34)
MCHC RBC AUTO-ENTMCNC: 30.9 G/DL (ref 30–36.5)
MCV RBC AUTO: 87.6 FL (ref 80–99)
MONOCYTES # BLD: 0.6 K/UL (ref 0–1)
MONOCYTES NFR BLD: 10 % (ref 5–13)
NEUTS SEG # BLD: 2.9 K/UL (ref 1.8–8)
NEUTS SEG NFR BLD: 46 % (ref 32–75)
NITRITE UR QL STRIP.AUTO: NEGATIVE
NRBC # BLD: 0 K/UL (ref 0–0.01)
NRBC BLD-RTO: 0 PER 100 WBC
PH UR STRIP: 5.5 [PH] (ref 5–8)
PLATELET # BLD AUTO: 268 K/UL (ref 150–400)
PMV BLD AUTO: 10.7 FL (ref 8.9–12.9)
POTASSIUM SERPL-SCNC: 3.7 MMOL/L (ref 3.5–5.1)
PROT SERPL-MCNC: 8 G/DL (ref 6.4–8.2)
PROT UR STRIP-MCNC: 30 MG/DL
RBC # BLD AUTO: 5.07 M/UL (ref 3.8–5.2)
RBC #/AREA URNS HPF: ABNORMAL /HPF (ref 0–5)
SAMPLES BEING HELD,HOLD: NORMAL
SODIUM SERPL-SCNC: 137 MMOL/L (ref 136–145)
SP GR UR REFRACTOMETRY: 1.02 (ref 1–1.03)
UR CULT HOLD, URHOLD: NORMAL
UROBILINOGEN UR QL STRIP.AUTO: 1 EU/DL (ref 0.2–1)
WBC # BLD AUTO: 6.2 K/UL (ref 3.6–11)
WBC URNS QL MICRO: ABNORMAL /HPF (ref 0–4)
YEAST URNS QL MICRO: PRESENT

## 2019-04-18 PROCEDURE — 36415 COLL VENOUS BLD VENIPUNCTURE: CPT

## 2019-04-18 PROCEDURE — 85025 COMPLETE CBC W/AUTO DIFF WBC: CPT

## 2019-04-18 PROCEDURE — 96360 HYDRATION IV INFUSION INIT: CPT

## 2019-04-18 PROCEDURE — 74011250636 HC RX REV CODE- 250/636: Performed by: EMERGENCY MEDICINE

## 2019-04-18 PROCEDURE — 96361 HYDRATE IV INFUSION ADD-ON: CPT

## 2019-04-18 PROCEDURE — 99284 EMERGENCY DEPT VISIT MOD MDM: CPT

## 2019-04-18 PROCEDURE — 81001 URINALYSIS AUTO W/SCOPE: CPT

## 2019-04-18 PROCEDURE — 80053 COMPREHEN METABOLIC PANEL: CPT

## 2019-04-18 PROCEDURE — 81025 URINE PREGNANCY TEST: CPT

## 2019-04-18 RX ORDER — MECLIZINE HYDROCHLORIDE 25 MG/1
25 TABLET ORAL
Qty: 10 TAB | Refills: 0 | Status: SHIPPED | OUTPATIENT
Start: 2019-04-18 | End: 2019-05-10

## 2019-04-18 RX ORDER — MECLIZINE HCL 12.5 MG 12.5 MG/1
50 TABLET ORAL
Status: COMPLETED | OUTPATIENT
Start: 2019-04-18 | End: 2019-04-18

## 2019-04-18 RX ORDER — SODIUM CHLORIDE 9 MG/ML
1000 INJECTION, SOLUTION INTRAVENOUS ONCE
Status: COMPLETED | OUTPATIENT
Start: 2019-04-18 | End: 2019-04-18

## 2019-04-18 RX ADMIN — MECLIZINE 50 MG: 12.5 TABLET ORAL at 02:51

## 2019-04-18 RX ADMIN — SODIUM CHLORIDE 1000 ML: 900 INJECTION, SOLUTION INTRAVENOUS at 02:55

## 2019-04-18 NOTE — ED PROVIDER NOTES
33 yo female presents to ER for evaluation of dizziness . [facility]. Pt was treated for STD and high sugar. Wednesday morning patient continued with dizziness. Dizziness has continued through the day into tonight. Dizziness described as lightheadedness and spinning. + nausea  But no vomiting. + diarrhea all day. Stool nonbloody. No dysuria. No blood in stool, no dark or black stool. No cp, shortness of breath, racing heart. No URI symptoms. Social hx  Nonsmoker  No alcohol    The history is provided by the patient. No  was used. Dizziness   Pertinent negatives include no shortness of breath, no chest pain, no vomiting, no headaches and no nausea.         Past Medical History:   Diagnosis Date    Gallstones     History of chickenpox 6/11/2010    Hyperlipidemia 6/24/2014    Hypertension     Not on any meds    Morbid obesity ([address]     CH (nonalcoholic steatohepatitis) 1/13/2016       Past Surgical History:   Procedure Laterality Date    HX CHOLECYSTECTOMY  7/2011         Family History:   Problem Relation Age of Onset    High Cholesterol Mother     Diabetes Father     Heart Failure Father     Hypertension Father     Hypertension Maternal Grandmother     Emphysema Maternal Grandfather     Diabetes Maternal Grandfather     Hypertension Maternal Grandfather     Cancer Maternal Grandfather         prostate cancer    Heart Disease Maternal Aunt        Social History     Socioeconomic History    Marital status: SINGLE     Spouse name: Not on file    Number of children: Not on file    Years of education: Not on file    Highest education level: Not on file   Occupational History    Not on file   Social Needs    Financial resource strain: Not on file    Food insecurity:     Worry: Not on file     Inability: Not on file    Transportation needs:     Medical: Not on file     Non-medical: Not on file   Tobacco Use    Smoking status: Never Smoker    Smokeless tobacco: Never Used   Substance and Sexual Activity    Alcohol use: Yes     Alcohol/week: 0.0 oz     Comment: occasional etoh    Drug use: No     Types: Prescription    Sexual activity: Yes   Lifestyle    Physical activity:     Days per week: Not on file     Minutes per session: Not on file    Stress: Not on file   Relationships    Social connections:     Talks on phone: Not on file     Gets together: Not on file     Attends Rastafarian service: Not on file     Active member of club or organization: Not on file     Attends meetings of clubs or organizations: Not on file     Relationship status: Not on file    Intimate partner violence:     Fear of current or ex partner: Not on file     Emotionally abused: Not on file     Physically abused: Not on file     Forced sexual activity: Not on file   Other Topics Concern    Not on file   Social History Narrative    Not on file         ALLERGIES: Pcn [penicillins]    Review of Systems   Constitutional: Negative for chills and fever. HENT: Positive for sore throat. Negative for congestion and rhinorrhea. Respiratory: Negative for cough and shortness of breath. Cardiovascular: Negative for chest pain and palpitations. Gastrointestinal: Positive for diarrhea. Negative for abdominal pain, blood in stool, nausea and vomiting. Genitourinary: Negative for dysuria, flank pain, frequency and urgency. Musculoskeletal: Negative for arthralgias, myalgias, neck pain and neck stiffness. Skin: Negative for rash and wound. Neurological: Positive for dizziness. Negative for numbness and headaches. All other systems reviewed and are negative. Vitals:    04/18/19 0013   BP: (!) 180/111   Pulse: (!) 103   Resp: 18   Temp: 98.1 °F (36.7 °C)   SpO2: 96%   Weight: 150.2 kg (331 lb 2.1 oz)   Height: 5' 2\" (1.575 m)            Physical Exam   Constitutional: She is oriented to person, place, and time. She appears well-developed and well-nourished.  No distress. HENT:   Head: Normocephalic and atraumatic. Right Ear: External ear normal.   Left Ear: External ear normal.   Eyes: Pupils are equal, round, and reactive to light. Conjunctivae and EOM are normal.   Neck: Normal range of motion. Neck supple. Cardiovascular: Normal rate and regular rhythm. Pulmonary/Chest: Effort normal and breath sounds normal. No respiratory distress. She has no wheezes. Abdominal: Soft. Normal appearance and bowel sounds are normal. She exhibits no shifting dullness, no distension, no pulsatile liver, no abdominal bruit, no pulsatile midline mass and no mass. There is no hepatosplenomegaly, splenomegaly or hepatomegaly. There is no tenderness. There is no rigidity, no rebound, no guarding, no CVA tenderness, no tenderness at McBurney's point and negative Thorne's sign. Musculoskeletal: Normal range of motion. She exhibits no edema or tenderness. Neurological: She is alert and oriented to person, place, and time. She has normal reflexes. She displays normal reflexes. No cranial nerve deficit. Coordination normal.   Skin: Skin is warm and dry. No rash noted. No erythema. Psychiatric: She has a normal mood and affect. Her behavior is normal. Judgment and thought content normal.   Nursing note and vitals reviewed. MDM  Number of Diagnoses or Management Options  Dizziness:   Diagnosis management comments: 33 yo female presenting to ER for evaluation of dizziness for 2 days. Pt alert and oriented. No distress. Lungs clear. Abdomen soft and nontender  P: iv fluid, labs, ua    4:30 AM  Pt reevaluated after antivert and fluid. Dizziness has resolved. No nausea. Patient is well hydrated, well appearing, and in no respiratory distress. Physical exam is reassuring, and without signs of serious illness. Will discharge patient home with supportive care, and follow-up with PCP within the next few days. Patient's results have been reviewed with them.   Patient and/or family have verbally conveyed their understanding and agreement of the patient's signs, symptoms, diagnosis, treatment and prognosis and additionally agree to follow up as recommended or return to the Emergency Room should their condition change prior to follow-up. Discharge instructions have also been provided to the patient with some educational information regarding their diagnosis as well a list of reasons why they would want to return to the ER prior to their follow-up appointment should their condition change. Amount and/or Complexity of Data Reviewed  Discuss the patient with other providers: yes (ER Attending-[facility])    Patient Progress  Patient progress: stable         Procedures      Pt case including HPI, PE, and all available lab and radiology results has been discussed with attending physician. Opportunity to evaluate patient has been provided to ER attending. Discharge and prescription plan has been agreed upon.

## 2019-04-18 NOTE — ED TRIAGE NOTES
Awoke Tuesday with dizziness. Was seen at [facility] today and treated for an STD. She says the dizziness got worse and she returned to [facility]. They gave her medication for nausea. She is her because of the continued dizziness.

## 2019-04-18 NOTE — ED TRIAGE NOTES
Patient arrives ambulatory from home with c/o dizziness x2 days. Pt was seen at [facility] yesterday for same symptoms, Pt states she was treated for hyperglycemia and and STD there but still feels worse. +nausea and diarrhea  Denies urinary symptoms. Denies chest pain and shortness of breath.

## 2019-04-18 NOTE — DISCHARGE INSTRUCTIONS
Antivert:1 pill every 8 hours as needed for dizziness. Use nausea medicine from [facility] as needed. Return to ER for any fever, chills, nausea or vomiting, worsening or change in dizziness. Dizziness: Care Instructions  Your Care Instructions  Dizziness is the feeling of unsteadiness or fuzziness in your head. It is different than having vertigo, which is a feeling that the room is spinning or that you are moving or falling. It is also different from lightheadedness, which is the feeling that you are about to faint. It can be hard to know what causes dizziness. Some people feel dizzy when they have migraine headaches. Sometimes bouts of flu can make you feel dizzy. Some medical conditions, such as heart problems or high blood pressure, can make you feel dizzy. Many medicines can cause dizziness, including medicines for high blood pressure, pain, or anxiety. If a medicine causes your symptoms, your doctor may recommend that you stop or change the medicine. If it is a problem with your heart, you may need medicine to help your heart work better. If there is no clear reason for your symptoms, your doctor may suggest watching and waiting for a while to see if the dizziness goes away on its own. Follow-up care is a key part of your treatment and safety. Be sure to make and go to all appointments, and call your doctor if you are having problems. It's also a good idea to know your test results and keep a list of the medicines you take. How can you care for yourself at home? · If your doctor recommends or prescribes medicine, take it exactly as directed. Call your doctor if you think you are having a problem with your medicine. · Do not drive while you feel dizzy. · Try to prevent falls. Steps you can take include:  ? Using nonskid mats, adding grab bars near the tub, and using night-lights. ? Clearing your home so that walkways are free of anything you might trip on.  ?  Letting family and friends know that you have been feeling dizzy. This will help them know how to help you. When should you call for help? Call 911 anytime you think you may need emergency care. For example, call if:    · You passed out (lost consciousness).     · You have dizziness along with symptoms of a heart attack. These may include:  ? Chest pain or pressure, or a strange feeling in the chest.  ? Sweating. ? Shortness of breath. ? Nausea or vomiting. ? Pain, pressure, or a strange feeling in the back, neck, jaw, or upper belly or in one or both shoulders or arms. ? Lightheadedness or sudden weakness. ? A fast or irregular heartbeat.     · You have symptoms of a stroke. These may include:  ? Sudden numbness, tingling, weakness, or loss of movement in your face, arm, or leg, especially on only one side of your body. ? Sudden vision changes. ? Sudden trouble speaking. ? Sudden confusion or trouble understanding simple statements. ? Sudden problems with walking or balance. ? A sudden, severe headache that is different from past headaches.    Call your doctor now or seek immediate medical care if:    · You feel dizzy and have a fever, headache, or ringing in your ears.     · You have new or increased nausea and vomiting.     · Your dizziness does not go away or comes back.    Watch closely for changes in your health, and be sure to contact your doctor if:    · You do not get better as expected. Where can you learn more? Go to http://bravo-alex.info/. Enter N859 in the search box to learn more about \"Dizziness: Care Instructions. \"  Current as of: September 23, 2018  Content Version: 11.9  © 1770-2280 [employer]. Care instructions adapted under license by [email or web address] (which disclaims liability or warranty for this information).  If you have questions about a medical condition or this instruction, always ask your healthcare professional. Armaan [employer] disclaims any warranty or liability for your use of this information. Patient Education        Vertigo: Care Instructions  Your Care Instructions    Vertigo is the feeling that you or your surroundings are moving when there is no actual movement. It is often described as a feeling of spinning, whirling, falling, or tilting. Vertigo may make you vomit or feel nauseated. You may have trouble standing or walking and may lose your balance. Vertigo is often related to an inner ear problem, but it can have other more serious causes. If vertigo continues, you may need more tests to find its cause. Follow-up care is a key part of your treatment and safety. Be sure to make and go to all appointments, and call your doctor if you are having problems. It's also a good idea to know your test results and keep a list of the medicines you take. How can you care for yourself at home? · Do not lie flat on your back. Prop yourself up slightly. This may reduce the spinning feeling. Keep your eyes open. · Move slowly so that you do not fall. · If your doctor recommends medicine, take it exactly as directed. · Do not drive while you are having vertigo. Certain exercises, called Martins-Daroff exercises, can help decrease vertigo. To do Martins-Daroff exercises:  · Sit on the edge of a bed or sofa and quickly lie down on the side that causes the worst vertigo. Lie on your side with your ear down. · Stay in this position for at least 30 seconds or until the vertigo goes away. · Sit up. If this causes vertigo, wait for it to stop. · Repeat the procedure on the other side. · Repeat this 10 times. Do these exercises 2 times a day until the vertigo is gone. When should you call for help? Call 911 anytime you think you may need emergency care. For example, call if:    · You passed out (lost consciousness).     · You have symptoms of a stroke.  These may include:  ? Sudden numbness, tingling, weakness, or loss of movement in your face, arm, or leg, especially on only one side of your body. ? Sudden vision changes. ? Sudden trouble speaking. ? Sudden confusion or trouble understanding simple statements. ? Sudden problems with walking or balance. ? A sudden, severe headache that is different from past headaches.    Call your doctor now or seek immediate medical care if:    · Vertigo occurs with a fever, a headache, or ringing in your ears.     · You have new or increased nausea and vomiting.    Watch closely for changes in your health, and be sure to contact your doctor if:    · Vertigo gets worse or happens more often.     · Vertigo has not gotten better after 2 weeks. Where can you learn more? Go to http://bravo-alex.info/. Enter K361 in the search box to learn more about \"Vertigo: Care Instructions. \"  Current as of: March 27, 2018  Content Version: 11.9  © 0043-2021 [employer]. Care instructions adapted under license by [employer] (which disclaims liability or warranty for this information). If you have questions about a medical condition or this instruction, always ask your healthcare professional. [place] [address] any warranty or liability for your use of this information.

## 2019-05-10 ENCOUNTER — OFFICE VISIT (OUTPATIENT)
Dept: FAMILY MEDICINE CLINIC | Age: 34
End: 2019-05-10

## 2019-05-10 VITALS
DIASTOLIC BLOOD PRESSURE: 86 MMHG | OXYGEN SATURATION: 98 % | RESPIRATION RATE: 16 BRPM | BODY MASS INDEX: 53.92 KG/M2 | SYSTOLIC BLOOD PRESSURE: 124 MMHG | TEMPERATURE: 98.2 F | HEART RATE: 92 BPM | HEIGHT: 62 IN | WEIGHT: 293 LBS

## 2019-05-10 DIAGNOSIS — Z11.1 TUBERCULOSIS SCREENING: ICD-10-CM

## 2019-05-10 DIAGNOSIS — I10 ESSENTIAL HYPERTENSION: ICD-10-CM

## 2019-05-10 DIAGNOSIS — E78.5 HYPERLIPIDEMIA, UNSPECIFIED HYPERLIPIDEMIA TYPE: ICD-10-CM

## 2019-05-10 DIAGNOSIS — E66.01 OBESITY, MORBID (HCC): ICD-10-CM

## 2019-05-10 DIAGNOSIS — E11.65 UNCONTROLLED TYPE 2 DIABETES MELLITUS WITH HYPERGLYCEMIA (HCC): Primary | ICD-10-CM

## 2019-05-10 RX ORDER — METFORMIN HYDROCHLORIDE 500 MG/1
TABLET, EXTENDED RELEASE ORAL
Refills: 0 | COMMUNITY
Start: 2019-02-28 | End: 2019-05-10 | Stop reason: SDUPTHER

## 2019-05-10 RX ORDER — METFORMIN HYDROCHLORIDE 500 MG/1
TABLET, EXTENDED RELEASE ORAL
Qty: 120 TAB | Refills: 5 | Status: SHIPPED | OUTPATIENT
Start: 2019-05-10 | End: 2020-08-19 | Stop reason: SDUPTHER

## 2019-05-10 NOTE — PROGRESS NOTES
[facility] Note      Subjective:     Chief Complaint   Patient presents with    Hypertension     follow up    Weight Management     follow up     Sreedhar Manrique is a 34y.o. year old female who presents for evaluation of the following:      HTN:   Tx: hydrochlozide 12.5mg + lisinopril 5mg daily  No home monitoring log in office. 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  Tx: forskolin, zehra clense  Actiivyt: Fredy, youtube 30 minutes daily  BMI 64    Depression:  Feeling depressed  Talking to her si ter with some improvement  ELSIE:11  3 most recent PHQ Screens 5/10/2019   Little interest or pleasure in doing things Several days   Feeling down, depressed, irritable, or hopeless More than half the days   Total Score PHQ 2 3   Trouble falling or staying asleep, or sleeping too much Nearly every day   Feeling tired or having little energy Several days   Poor appetite, weight loss, or overeating Several days   Feeling bad about yourself - or that you are a failure or have let yourself or your family down Nearly every day   Trouble concentrating on things such as school, work, reading, or watching TV Not at all         Asks for ppd for work       Care Team:   Dentist- None  Optho- None  GYN- None      Social:   Works [employer] personal [employer]  Lives with mother      Review of Systems   Pertinent positives and negative per HPI. All other systems  reviewed are negative for a Comprehensive ROS (10+).        Past Medical History:   Diagnosis Date    Gallstones     History of chickenpox 6/11/2010    Hyperlipidemia 6/24/2014    Hypertension     Not on any meds    Morbid obesity ([address]     CH (nonalcoholic steatohepatitis) 1/13/2016        Social History     Socioeconomic History    Marital status: SINGLE     Spouse name: Not on file    Number of children: Not on file  Years of education: Not on file    Highest education level: Not on file   Occupational History    Not on file   Social Needs    Financial resource strain: Not on file    Food insecurity:     Worry: Not on file     Inability: Not on file    Transportation needs:     Medical: Not on file     Non-medical: Not on file   Tobacco Use    Smoking status: Never Smoker    Smokeless tobacco: Never Used   Substance and Sexual Activity    Alcohol use: Yes     Alcohol/week: 0.0 oz     Comment: occasional etoh    Drug use: No     Types: Prescription    Sexual activity: Yes   Lifestyle    Physical activity:     Days per week: Not on file     Minutes per session: Not on file    Stress: Not on file   Relationships    Social connections:     Talks on phone: Not on file     Gets together: Not on file     Attends Protestant service: Not on file     Active member of club or organization: Not on file     Attends meetings of clubs or organizations: Not on file     Relationship status: Not on file    Intimate partner violence:     Fear of current or ex partner: Not on file     Emotionally abused: Not on file     Physically abused: Not on file     Forced sexual activity: Not on file   Other Topics Concern    Not on file   Social History Narrative    Not on file       Current Outpatient Medications   Medication Sig    metFORMIN ER (GLUCOPHAGE XR) 500 mg tablet TAKE 2 TABLETS BY MOUTH TWICE A DAY    hydroCHLOROthiazide (MICROZIDE) 12.5 mg capsule Take 1 Cap by mouth daily.  lisinopril (PRINIVIL, ZESTRIL) 5 mg tablet     atorvastatin (LIPITOR) 40 mg tablet     glipiZIDE (GLUCOTROL) 10 mg tablet Take 1 Tab by mouth two (2) times a day.  Blood-Glucose Meter monitoring kit Fill with strips and lancets for bid testing.  Uncontrolled type 2 DM E11.21    glucose blood VI test strips (BLOOD GLUCOSE TEST) strip Any brand, for uncontrolled DM E11.21 check up to 3x daily    meclizine (ANTIVERT) 25 mg tablet Take 1 Tab by mouth three (3) times daily as needed for Dizziness.  metFORMIN (GLUCOPHAGE) 500 mg tablet Take 1 Tab by mouth two (2) times daily (with meals).  atorvastatin (LIPITOR) 20 mg tablet Take 1 Tab by mouth every evening. No current facility-administered medications for this visit. Objective:     Vitals:    05/10/19 1043   BP: 124/86   Pulse: 92   Resp: 16   Temp: 98.2 °F (36.8 °C)   TempSrc: Oral   SpO2: 98%   Weight: 332 lb (150.6 kg)   Height: 5' 2\" (1.575 m)       Physical Examination:  General: Alert, cooperative, no distress, appears stated age. Obese  Eyes: Conjunctivae clear. PERRL, EOMs intact. Ears: Normal external ear canals both ears. Nose: Nares normal.   Mouth/Throat: Lips, mucosa, and tongue normal.   Neck: Supple, symmetrical, trachea midline, no adenopathy. No thyroid enlargement/tenderness/nodules  Back: Symmetric, no curvature. ROM normal.   Lungs: Clear to auscultation bilaterally. Normal inspiratory and expiratory ratio. Heart: Regular rate and rhythm, S1, S2 normal, no murmur, click, rub or gallop. Abdomen: Soft, non-tender, non distended. Normal bowel sounds. Extremities: Extremities normal, atraumatic, no cyanosis or edema. Skin: Skin color, texture, turgor normal. No rashes or lesions on exposed skin. Lymph nodes: Cervical, supraclavicular nodes normal.  Neurologic: CNII-XII intact.      Admission on 04/18/2019, Discharged on 04/18/2019   Component Date Value Ref Range Status    WBC 04/18/2019 6.2  3.6 - 11.0 K/uL Final    RBC 04/18/2019 5.07  3.80 - 5.20 M/uL Final    HGB 04/18/2019 13.7  11.5 - 16.0 g/dL Final    HCT 04/18/2019 44.4  35.0 - 47.0 % Final    MCV 04/18/2019 87.6  80.0 - 99.0 FL Final    MCH 04/18/2019 27.0  26.0 - 34.0 PG Final    MCHC 04/18/2019 30.9  30.0 - 36.5 g/dL Final    RDW 04/18/2019 13.5  11.5 - 14.5 % Final    PLATELET 66/87/4212 289  150 - 400 K/uL Final    MPV 04/18/2019 10.7  8.9 - 12.9 FL Final    NRBC 04/18/2019 0.0  0  WBC Final    ABSOLUTE NRBC 04/18/2019 0.00  0.00 - 0.01 K/uL Final    NEUTROPHILS 04/18/2019 46  32 - 75 % Final    LYMPHOCYTES 04/18/2019 41  12 - 49 % Final    MONOCYTES 04/18/2019 10  5 - 13 % Final    EOSINOPHILS 04/18/2019 1  0 - 7 % Final    BASOPHILS 04/18/2019 1  0 - 1 % Final    IMMATURE GRANULOCYTES 04/18/2019 1* 0.0 - 0.5 % Final    ABS. NEUTROPHILS 04/18/2019 2.9  1.8 - 8.0 K/UL Final    ABS. LYMPHOCYTES 04/18/2019 2.6  0.8 - 3.5 K/UL Final    ABS. MONOCYTES 04/18/2019 0.6  0.0 - 1.0 K/UL Final    ABS. EOSINOPHILS 04/18/2019 0.1  0.0 - 0.4 K/UL Final    ABS. BASOPHILS 04/18/2019 0.0  0.0 - 0.1 K/UL Final    ABS. IMM. GRANS. 04/18/2019 0.1* 0.00 - 0.04 K/UL Final    DF 04/18/2019 AUTOMATED    Final    Sodium 04/18/2019 137  136 - 145 mmol/L Final    Potassium 04/18/2019 3.7  3.5 - 5.1 mmol/L Final    Chloride 04/18/2019 103  97 - 108 mmol/L Final    CO2 04/18/2019 26  21 - 32 mmol/L Final    Anion gap 04/18/2019 8  5 - 15 mmol/L Final    Glucose 04/18/2019 297* 65 - 100 mg/dL Final    BUN 04/18/2019 6  6 - 20 MG/DL Final    Creatinine 04/18/2019 0.64  0.55 - 1.02 MG/DL Final    BUN/Creatinine ratio 04/18/2019 9* 12 - 20   Final    GFR est AA 04/18/2019 >60  >60 ml/min/1.73m2 Final    GFR est non-AA 04/18/2019 >60  >60 ml/min/1.73m2 Final    Comment: Estimated GFR is calculated using the IDMS-traceable Modification of Diet in Renal Disease (MDRD) Study equation, reported for both  Americans (GFRAA) and non- Americans (GFRNA), and normalized to 1.73m2 body surface area. The physician must decide which value applies to the patient. The MDRD study equation should only be used in individuals age 25 or older. It has not been validated for the following: pregnant women, patients with serious comorbid conditions, or on certain medications, or persons with extremes of body size, muscle mass, or nutritional status.       Calcium 04/18/2019 9.4  8.5 - 10.1 MG/DL Final    Bilirubin, total 04/18/2019 0.3  0.2 - 1.0 MG/DL Final    ALT (SGPT) 04/18/2019 28  12 - 78 U/L Final    AST (SGOT) 04/18/2019 38* 15 - 37 U/L Final    Alk. phosphatase 04/18/2019 138* 45 - 117 U/L Final    Protein, total 04/18/2019 8.0  6.4 - 8.2 g/dL Final    Albumin 04/18/2019 3.2* 3.5 - 5.0 g/dL Final    Globulin 04/18/2019 4.8* 2.0 - 4.0 g/dL Final    A-G Ratio 04/18/2019 0.7* 1.1 - 2.2   Final    Color 04/18/2019 YELLOW/STRAW    Final    Color Reference Range: Straw, Yellow or Dark Yellow    Appearance 04/18/2019 CLOUDY* CLEAR   Final    Specific gravity 04/18/2019 1.025  1.003 - 1.030   Final    pH (UA) 04/18/2019 5.5  5.0 - 8.0   Final    Protein 04/18/2019 30* NEG mg/dL Final    Glucose 04/18/2019 >1,000* NEG mg/dL Final    Ketone 04/18/2019 80* NEG mg/dL Final    Blood 04/18/2019 NEGATIVE   NEG   Final    Urobilinogen 04/18/2019 1.0  0.2 - 1.0 EU/dL Final    Nitrites 04/18/2019 NEGATIVE   NEG   Final    Leukocyte Esterase 04/18/2019 NEGATIVE   NEG   Final    WBC 04/18/2019 5-10  0 - 4 /hpf Final    RBC 04/18/2019 5-10  0 - 5 /hpf Final    Epithelial cells 04/18/2019 FEW  FEW /lpf Final    Epithelial cell category consists of squamous cells and /or transitional urothelial cells. Renal tubular cells, if present, are separately identified as such.  Bacteria 04/18/2019 1+* NEG /hpf Final    Yeast 04/18/2019 PRESENT* NEG   Final    Urine culture hold 04/18/2019 URINE ON HOLD IN MICROBIOLOGY DEPT FOR 3 DAYS. IF UNPRESERVED URINE IS SUBMITTED, IT CANNOT BE USED FOR ADDITIONAL TESTING AFTER 24 HRS, RECOLLECTION WILL BE REQUIRED. Final    SAMPLES BEING HELD 04/18/2019 rd,bl   Final    COMMENT 04/18/2019 Add-on orders for these samples will be processed based on acceptable specimen integrity and analyte stability, which may vary by analyte.     Final    Pregnancy test,urine (POC) 04/18/2019 NEGATIVE   NEG   Final    Bilirubin UA, confirm 04/18/2019 NEGATIVE   NEG   Final Assessment/ Plan:   Diagnoses and all orders for this visit:    1. Uncontrolled type 2 diabetes mellitus with hyperglycemia (HCC)  -     metFORMIN ER (GLUCOPHAGE XR) 500 mg tablet; TAKE 2 TABLETS BY MOUTH TWICE A DAY    2. Obesity, morbid ([address]    3. Essential hypertension    4. Tuberculosis screening  -     QUANTIFERON-TB GOLD PLUS  -     HEMOGLOBIN A1C WITH EAG    5. Hyperlipidemia, unspecified hyperlipidemia type        BP well controlled. Continue current regimen. Monitor chronic diseases with labs  Diet and lifestyle modification encouraged for weight loss. TB screen as requested    Educated patient on red flag symptoms to warrant return to clinic or emergency room visit. I have discussed the diagnosis with the patient and the intended plan as seen in the above orders. The patient has been offered or received an after-visit summary and questions were answered concerning future plans. I have discussed medication side effects and warnings with the patient as well. Follow-up and Dispositions    · Return in about 1 month (around 6/7/2019) for Follow Up.          Signed,    Monik Brown MD  5/10/2019

## 2019-05-10 NOTE — PATIENT INSTRUCTIONS
Weight Loss Tips:    Remember this is like a part time job so your motivation and commitment is key to your success. Use small plate only  Drink 2 liters (1/2 gallon) of water every day  1/2 of every meal is fruit or vegetable  Try meal prepping on Sunday with new different vegetables. Replace soda with diet soda or other zero sugar drinks (selter water just fine)  Start using the [employer] netta for calorie counting. Goal 1800 calories per day  Start work out at least 5 days per week for 40 minutes. Consider [employer] netta for home exercises. Learning About Diabetes Food Guidelines  Your Care Instructions    Meal planning is important to manage diabetes. It helps keep your blood sugar at a target level (which you set with your doctor). You don't have to eat special foods. You can eat what your family eats, including sweets once in a while. But you do have to pay attention to how often you eat and how much you eat of certain foods. You may want to work with a dietitian or a certified diabetes educator (CDE) to help you plan meals and snacks. A dietitian or CDE can also help you lose weight if that is one of your goals. What should you know about eating carbs? Managing the amount of carbohydrate (carbs) you eat is an important part of healthy meals when you have diabetes. Carbohydrate is found in many foods. · Learn which foods have carbs. And learn the amounts of carbs in different foods. ? Bread, cereal, pasta, and rice have about 15 grams of carbs in a serving. A serving is 1 slice of bread (1 ounce), ½ cup of cooked cereal, or 1/3 cup of cooked pasta or rice. ? Fruits have 15 grams of carbs in a serving. A serving is 1 small fresh fruit, such as an apple or orange; ½ of a banana; ½ cup of cooked or canned fruit; ½ cup of fruit juice; 1 cup of melon or raspberries; or 2 tablespoons of dried fruit. ? Milk and no-sugar-added yogurt have 15 grams of carbs in a serving.  A serving is 1 cup of milk or 2/3 cup of no-sugar-added yogurt. ? Starchy vegetables have 15 grams of carbs in a serving. A serving is ½ cup of mashed potatoes or sweet potato; 1 cup winter squash; ½ of a small baked potato; ½ cup of cooked beans; or ½ cup cooked corn or green peas. · Learn how much carbs to eat each day and at each meal. A dietitian or CDE can teach you how to keep track of the amount of carbs you eat. This is called carbohydrate counting. · If you are not sure how to count carbohydrate grams, use the Plate Method to plan meals. It is a good, quick way to make sure that you have a balanced meal. It also helps you spread carbs throughout the day. ? Divide your plate by types of foods. Put non-starchy vegetables on half the plate, meat or other protein food on one-quarter of the plate, and a grain or starchy vegetable in the final quarter of the plate. To this you can add a small piece of fruit and 1 cup of milk or yogurt, depending on how many carbs you are supposed to eat at a meal.  · Try to eat about the same amount of carbs at each meal. Do not \"save up\" your daily allowance of carbs to eat at one meal.  · Proteins have very little or no carbs per serving. Examples of proteins are beef, chicken, turkey, fish, eggs, tofu, cheese, cottage cheese, and peanut butter. A serving size of meat is 3 ounces, which is about the size of a deck of cards. Examples of meat substitute serving sizes (equal to 1 ounce of meat) are 1/4 cup of cottage cheese, 1 egg, 1 tablespoon of peanut butter, and ½ cup of tofu. How can you eat out and still eat healthy? · Learn to estimate the serving sizes of foods that have carbohydrate. If you measure food at home, it will be easier to estimate the amount in a serving of restaurant food. · If the meal you order has too much carbohydrate (such as potatoes, corn, or baked beans), ask to have a low-carbohydrate food instead. Ask for a salad or green vegetables.   · If you use insulin, check your blood sugar before and after eating out to help you plan how much to eat in the future. · If you eat more carbohydrate at a meal than you had planned, take a walk or do other exercise. This will help lower your blood sugar. What else should you know? · Limit saturated fat, such as the fat from meat and dairy products. This is a healthy choice because people who have diabetes are at higher risk of heart disease. So choose lean cuts of meat and nonfat or low-fat dairy products. Use olive or canola oil instead of butter or shortening when cooking. · Don't skip meals. Your blood sugar may drop too low if you skip meals and take insulin or certain medicines for diabetes. · Check with your doctor before you drink alcohol. Alcohol can cause your blood sugar to drop too low. Alcohol can also cause a bad reaction if you take certain diabetes medicines. Follow-up care is a key part of your treatment and safety. Be sure to make and go to all appointments, and call your doctor if you are having problems. It's also a good idea to know your test results and keep a list of the medicines you take. Where can you learn more? Go to http://bravo-alex.info/. Enter V305 in the search box to learn more about \"Learning About Diabetes Food Guidelines. \"  Current as of: July 25, 2018  Content Version: 11.9  © 2042-5337 [employer]. Care instructions adapted under license by [employer] (which disclaims liability or warranty for this information). If you have questions about a medical condition or this instruction, always ask your healthcare professional. [address] any warranty or liability for your use of this information.

## 2019-05-10 NOTE — PROGRESS NOTES
Chief Complaint   Patient presents with    Hypertension     follow up    Weight Management     follow up    Diabetes     1. Have you been to the ER, urgent care clinic since your last visit? Yes, dizziness, nausea ( Metformin ) . Hospitalized since your last visit? No    2. Have you seen or consulted any other health care providers outside of the [address] [place] [address] since your last visit? Include any pap smears or colon screening.  No

## 2019-05-11 LAB
EST. AVERAGE GLUCOSE BLD GHB EST-MCNC: 335 MG/DL
HBA1C MFR BLD: 13.3 % (ref 4.8–5.6)

## 2019-05-16 LAB
GAMMA INTERFERON BACKGROUND BLD IA-ACNC: 0.57 IU/ML
M TB IFN-G BLD-IMP: NEGATIVE
M TB IFN-G CD4+ BCKGRND COR BLD-ACNC: 0.1 IU/ML
MITOGEN IGNF BLD-ACNC: >10 IU/ML
QUANTIFERON INCUBATION, QF1T: NORMAL
QUANTIFERON TB2 AG: 0.05 IU/ML
SERVICE CMNT-IMP: NORMAL

## 2019-05-24 NOTE — PROGRESS NOTES
Print and mail results to patient- she need TB test to show employer. Discussed with patient via telephone. She recently restarted metformin extended release 2 weeks ago since standard release was causing GI upset. She is taking her glipizide as prescribed. She was hesitant but agreeable to trying Trulicity. Will add Trulicity. TB test negative.

## 2019-05-30 ENCOUNTER — DOCUMENTATION ONLY (OUTPATIENT)
Dept: FAMILY MEDICINE CLINIC | Age: 34
End: 2019-05-30

## 2019-05-30 DIAGNOSIS — E11.21 TYPE 2 DIABETES WITH NEPHROPATHY (HCC): Primary | ICD-10-CM

## 2019-05-30 NOTE — PROGRESS NOTES
Call placed to patient. No answer left message for patient to return call to office. Insurance did not cover trulicty.  Medication changed

## 2019-06-25 ENCOUNTER — OFFICE VISIT (OUTPATIENT)
Dept: FAMILY MEDICINE CLINIC | Age: 34
End: 2019-06-25

## 2019-06-25 VITALS
BODY MASS INDEX: 53.92 KG/M2 | WEIGHT: 293 LBS | SYSTOLIC BLOOD PRESSURE: 122 MMHG | RESPIRATION RATE: 18 BRPM | HEIGHT: 62 IN | HEART RATE: 97 BPM | OXYGEN SATURATION: 98 % | TEMPERATURE: 98.4 F | DIASTOLIC BLOOD PRESSURE: 88 MMHG

## 2019-06-25 DIAGNOSIS — I10 ESSENTIAL HYPERTENSION: ICD-10-CM

## 2019-06-25 DIAGNOSIS — E66.01 OBESITY, MORBID (HCC): ICD-10-CM

## 2019-06-25 DIAGNOSIS — E11.21 TYPE 2 DIABETES WITH NEPHROPATHY (HCC): Primary | ICD-10-CM

## 2019-06-25 DIAGNOSIS — F41.9 ANXIOUS MOOD: ICD-10-CM

## 2019-06-25 DIAGNOSIS — M17.11 PRIMARY OSTEOARTHRITIS OF RIGHT KNEE: ICD-10-CM

## 2019-06-25 LAB — GLUCOSE POC: 361 MG/DL

## 2019-06-25 NOTE — PATIENT INSTRUCTIONS
DASH Diet: Care Instructions  Your Care Instructions    The DASH diet is an eating plan that can help lower your blood pressure. DASH stands for Dietary Approaches to Stop Hypertension. Hypertension is high blood pressure. The DASH diet focuses on eating foods that are high in calcium, potassium, and magnesium. These nutrients can lower blood pressure. The foods that are highest in these nutrients are fruits, vegetables, low-fat dairy products, nuts, seeds, and legumes. But taking calcium, potassium, and magnesium supplements instead of eating foods that are high in those nutrients does not have the same effect. The DASH diet also includes whole grains, fish, and poultry. The DASH diet is one of several lifestyle changes your doctor may recommend to lower your high blood pressure. Your doctor may also want you to decrease the amount of sodium in your diet. Lowering sodium while following the DASH diet can lower blood pressure even further than just the DASH diet alone. Follow-up care is a key part of your treatment and safety. Be sure to make and go to all appointments, and call your doctor if you are having problems. It's also a good idea to know your test results and keep a list of the medicines you take. How can you care for yourself at home? Following the DASH diet  · Eat 4 to 5 servings of fruit each day. A serving is 1 medium-sized piece of fruit, ½ cup chopped or canned fruit, 1/4 cup dried fruit, or 4 ounces (½ cup) of fruit juice. Choose fruit more often than fruit juice. · Eat 4 to 5 servings of vegetables each day. A serving is 1 cup of lettuce or raw leafy vegetables, ½ cup of chopped or cooked vegetables, or 4 ounces (½ cup) of vegetable juice. Choose vegetables more often than vegetable juice. · Get 2 to 3 servings of low-fat and fat-free dairy each day. A serving is 8 ounces of milk, 1 cup of yogurt, or 1 ½ ounces of cheese. · Eat 6 to 8 servings of grains each day.  A serving is 1 slice of bread, 1 ounce of dry cereal, or ½ cup of cooked rice, pasta, or cooked cereal. Try to choose whole-grain products as much as possible. · Limit lean meat, poultry, and fish to 2 servings each day. A serving is 3 ounces, about the size of a deck of cards. · Eat 4 to 5 servings of nuts, seeds, and legumes (cooked dried beans, lentils, and split peas) each week. A serving is 1/3 cup of nuts, 2 tablespoons of seeds, or ½ cup of cooked beans or peas. · Limit fats and oils to 2 to 3 servings each day. A serving is 1 teaspoon of vegetable oil or 2 tablespoons of salad dressing. · Limit sweets and added sugars to 5 servings or less a week. A serving is 1 tablespoon jelly or jam, ½ cup sorbet, or 1 cup of lemonade. · Eat less than 2,300 milligrams (mg) of sodium a day. If you limit your sodium to 1,500 mg a day, you can lower your blood pressure even more. Tips for success  · Start small. Do not try to make dramatic changes to your diet all at once. You might feel that you are missing out on your favorite foods and then be more likely to not follow the plan. Make small changes, and stick with them. Once those changes become habit, add a few more changes. · Try some of the following:  ? Make it a goal to eat a fruit or vegetable at every meal and at snacks. This will make it easy to get the recommended amount of fruits and vegetables each day. ? Try yogurt topped with fruit and nuts for a snack or healthy dessert. ? Add lettuce, tomato, cucumber, and onion to sandwiches. ? Combine a ready-made pizza crust with low-fat mozzarella cheese and lots of vegetable toppings. Try using tomatoes, squash, spinach, broccoli, carrots, cauliflower, and onions. ? Have a variety of cut-up vegetables with a low-fat dip as an appetizer instead of chips and dip. ? Sprinkle sunflower seeds or chopped almonds over salads. Or try adding chopped walnuts or almonds to cooked vegetables.   ? Try some vegetarian meals using beans and peas. Add garbanzo or kidney beans to salads. Make burritos and tacos with mashed rosas beans or black beans. Where can you learn more? Go to http://bravo-alex.info/. Enter D909 in the search box to learn more about \"DASH Diet: Care Instructions. \"  Current as of: July 22, 2018  Content Version: 11.9  © 3810-9083 [employer]. Care instructions adapted under license by [employer] (which disclaims liability or warranty for this information). If you have questions about a medical condition or this instruction, always ask your healthcare professional. [address] any warranty or liability for your use of this information.

## 2019-06-25 NOTE — PROGRESS NOTES
[address] [facility] Note      Subjective:     Chief Complaint   Patient presents with    Blood Pressure Check     follow up    High Blood Sugar     follow up     Jolanta Stallworth is a 34y.o. year old female who presents for evaluation of the following:    DMII:   A1C  6.5 > 7.3> 10.8 >13  Tx: metformin 1000mg bid + glipizide  -Previous Trulicity but never filled due to insurance issue. Changed to bydureon but still not filled yet  Preivoius Tx: faxigo (stopped due to yeast infection)  Home monitoring: not checked  Eye: Done   Lab Results   Component Value Date/Time    Hemoglobin A1c 13.3 (H) 05/10/2019 11:38 AM    Hemoglobin A1c (POC) 10.8 08/17/2017 07:29 PM        HTN:   Tx: hydrochlozide 12.5mg + lisinopril 5mg daily  No home monitoring log in office.      Obesity:   Diet: unrestricted but trying ketro diet  Tx: forskolin, zehra clense  Actiivyt: Fredy, youtube 30 minutes daily  BMI 64    Depression:  Talking to her sister with some improvement  ELSIE:11> 9  3 most recent PHQ Screens 6/25/2019   Little interest or pleasure in doing things Not at all   Feeling down, depressed, irritable, or hopeless Not at all   Total Score PHQ 2 0   Trouble falling or staying asleep, or sleeping too much Several days   Feeling tired or having little energy Not at all   Poor appetite, weight loss, or overeating Several days   Feeling bad about yourself - or that you are a failure or have let yourself or your family down Not at all   Trouble concentrating on things such as school, work, reading, or watching TV Several days   Moving or speaking so slowly that other people could have noticed; or the opposite being so fidgety that others notice Not at all   Thoughts of being better off dead, or hurting yourself in some way Not at all   PHQ 9 Score 3       Knee OA:   Chronic, > 2 years  Tx: ibuprofen 800mg from another Rx  2017 Knee OA  IMPRESSION:  Tricompartmental osteoarthritis with large joint effusion. Care Team:   Dentist- None  Optho- None  GYN- None      Social:   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 All other systems  reviewed are negative for a Comprehensive ROS (10+). Past Medical History:   Diagnosis Date    Gallstones     History of chickenpox 6/11/2010    Hyperlipidemia 6/24/2014    Hypertension     Not on any meds    Morbid obesity ([address]     CH (nonalcoholic steatohepatitis) 1/13/2016        Social History     Socioeconomic History    Marital status: SINGLE     Spouse name: Not on file    Number of children: Not on file    Years of education: Not on file    Highest education level: Not on file   Occupational History    Not on file   Social Needs    Financial resource strain: Not on file    Food insecurity:     Worry: Not on file     Inability: Not on file    Transportation needs:     Medical: Not on file     Non-medical: Not on file   Tobacco Use    Smoking status: Never Smoker    Smokeless tobacco: Never Used   Substance and Sexual Activity    Alcohol use:  Yes     Alcohol/week: 0.0 oz     Comment: occasional etoh    Drug use: No     Types: Prescription    Sexual activity: Yes   Lifestyle    Physical activity:     Days per week: Not on file     Minutes per session: Not on file    Stress: Not on file   Relationships    Social connections:     Talks on phone: Not on file     Gets together: Not on file     Attends Yazidism service: Not on file     Active member of club or organization: Not on file     Attends meetings of clubs or organizations: Not on file     Relationship status: Not on file    Intimate partner violence:     Fear of current or ex partner: Not on file     Emotionally abused: Not on file     Physically abused: Not on file     Forced sexual activity: Not on file   Other Topics Concern    Not on file   Social History Narrative    Not on file       Current Outpatient Medications   Medication Sig    lisinopril (PRINIVIL, ZESTRIL) 5 mg tablet Take 1 Tab by mouth daily.  metFORMIN ER (GLUCOPHAGE XR) 500 mg tablet TAKE 2 TABLETS BY MOUTH TWICE A DAY    hydroCHLOROthiazide (MICROZIDE) 12.5 mg capsule Take 1 Cap by mouth daily.  atorvastatin (LIPITOR) 40 mg tablet Take 40 mg by mouth daily.  glipiZIDE (GLUCOTROL) 10 mg tablet Take 1 Tab by mouth two (2) times a day.  Blood-Glucose Meter monitoring kit Fill with strips and lancets for bid testing. Uncontrolled type 2 DM E11.21    glucose blood VI test strips (BLOOD GLUCOSE TEST) strip Any brand, for uncontrolled DM E11.21 check up to 3x daily    exenatide microspheres 2 mg serr 2 mg by SubCUTAneous route every seven (7) days. No current facility-administered medications for this visit. Objective:     Vitals:    06/25/19 1009   BP: 122/88   Pulse: 97   Resp: 18   Temp: 98.4 °F (36.9 °C)   TempSrc: Oral   SpO2: 98%   Weight: 332 lb 12.8 oz (151 kg)   Height: 5' 2\" (1.575 m)       Physical Examination:  General: Alert, cooperative, no distress, appears stated age. Morbidly obese  Eyes: Conjunctivae clear. PERRL, EOMs intact. Ears: Normal external ear canals both ears. Nose: Nares normal.   Mouth/Throat: Lips, mucosa, and tongue normal.   Neck: Supple, symmetrical, trachea midline, no adenopathy. No thyroid enlargement/tenderness/nodules  Back: Symmetric, no curvature. ROM normal.   Lungs: Clear to auscultation bilaterally. Normal inspiratory and expiratory ratio. Heart: Regular rate and rhythm, S1, S2 normal, no murmur, click, rub or gallop. Abdomen: Soft, non-tender, non distended. Normal bowel sounds. Extremities: Extremities normal, atraumatic, no cyanosis or edema. MSK: Bilateral knee crepitus. No laxity or effusion. Skin: Skin color, texture, turgor normal. No rashes or lesions on exposed skin. Lymph nodes: Cervical, supraclavicular nodes normal.  Neurologic: CNII-XII intact.      Office Visit on 05/10/2019   Component Date Value Ref Range Status    QuantiFERON Incubation 05/10/2019 Incubation performed. Final    QuantiFERON Criteria 05/10/2019 Comment   Final    Comment: The QuantiFERON-TB Gold Plus result is determined by subtracting  the Nil value from either TB antigen (Ag) tube. The mitogen tube  serves as a control for the test.      QuantiFERON TB1 Ag 05/10/2019 0.10  IU/mL Final    QuantiFERON TB2 Ag 05/10/2019 0.05  IU/mL Final    QuantiFERON Nil Value 05/10/2019 0.57  IU/mL Final    QuantiFERON Mitogen Value 05/10/2019 >10.00  IU/mL Final    QuantiFERON Plus 05/10/2019 Negative  Negative Final    Hemoglobin A1c 05/10/2019 13.3* 4.8 - 5.6 % Final    Comment:          Prediabetes: 5.7 - 6.4           Diabetes: >6.4           Glycemic control for adults with diabetes: <7.0      Estimated average glucose 05/10/2019 335  mg/dL Final   Admission on 04/18/2019, Discharged on 04/18/2019   Component Date Value Ref Range Status    WBC 04/18/2019 6.2  3.6 - 11.0 K/uL Final    RBC 04/18/2019 5.07  3.80 - 5.20 M/uL Final    HGB 04/18/2019 13.7  11.5 - 16.0 g/dL Final    HCT 04/18/2019 44.4  35.0 - 47.0 % Final    MCV 04/18/2019 87.6  80.0 - 99.0 FL Final    MCH 04/18/2019 27.0  26.0 - 34.0 PG Final    MCHC 04/18/2019 30.9  30.0 - 36.5 g/dL Final    RDW 04/18/2019 13.5  11.5 - 14.5 % Final    PLATELET 38/27/9017 429  150 - 400 K/uL Final    MPV 04/18/2019 10.7  8.9 - 12.9 FL Final    NRBC 04/18/2019 0.0  0  WBC Final    ABSOLUTE NRBC 04/18/2019 0.00  0.00 - 0.01 K/uL Final    NEUTROPHILS 04/18/2019 46  32 - 75 % Final    LYMPHOCYTES 04/18/2019 41  12 - 49 % Final    MONOCYTES 04/18/2019 10  5 - 13 % Final    EOSINOPHILS 04/18/2019 1  0 - 7 % Final    BASOPHILS 04/18/2019 1  0 - 1 % Final    IMMATURE GRANULOCYTES 04/18/2019 1* 0.0 - 0.5 % Final    ABS. NEUTROPHILS 04/18/2019 2.9  1.8 - 8.0 K/UL Final    ABS. LYMPHOCYTES 04/18/2019 2.6  0.8 - 3.5 K/UL Final    ABS. MONOCYTES 04/18/2019 0.6  0.0 - 1.0 K/UL Final    ABS. EOSINOPHILS 04/18/2019 0.1  0.0 - 0.4 K/UL Final    ABS. BASOPHILS 04/18/2019 0.0  0.0 - 0.1 K/UL Final    ABS. IMM. GRANS. 04/18/2019 0.1* 0.00 - 0.04 K/UL Final    DF 04/18/2019 AUTOMATED    Final    Sodium 04/18/2019 137  136 - 145 mmol/L Final    Potassium 04/18/2019 3.7  3.5 - 5.1 mmol/L Final    Chloride 04/18/2019 103  97 - 108 mmol/L Final    CO2 04/18/2019 26  21 - 32 mmol/L Final    Anion gap 04/18/2019 8  5 - 15 mmol/L Final    Glucose 04/18/2019 297* 65 - 100 mg/dL Final    BUN 04/18/2019 6  6 - 20 MG/DL Final    Creatinine 04/18/2019 0.64  0.55 - 1.02 MG/DL Final    BUN/Creatinine ratio 04/18/2019 9* 12 - 20   Final    GFR est AA 04/18/2019 >60  >60 ml/min/1.73m2 Final    GFR est non-AA 04/18/2019 >60  >60 ml/min/1.73m2 Final    Comment: Estimated GFR is calculated using the IDMS-traceable Modification of Diet in Renal Disease (MDRD) Study equation, reported for both  Americans (GFRAA) and non- Americans (GFRNA), and normalized to 1.73m2 body surface area. The physician must decide which value applies to the patient. The MDRD study equation should only be used in individuals age 25 or older. It has not been validated for the following: pregnant women, patients with serious comorbid conditions, or on certain medications, or persons with extremes of body size, muscle mass, or nutritional status.  Calcium 04/18/2019 9.4  8.5 - 10.1 MG/DL Final    Bilirubin, total 04/18/2019 0.3  0.2 - 1.0 MG/DL Final    ALT (SGPT) 04/18/2019 28  12 - 78 U/L Final    AST (SGOT) 04/18/2019 38* 15 - 37 U/L Final    Alk.  phosphatase 04/18/2019 138* 45 - 117 U/L Final    Protein, total 04/18/2019 8.0  6.4 - 8.2 g/dL Final    Albumin 04/18/2019 3.2* 3.5 - 5.0 g/dL Final    Globulin 04/18/2019 4.8* 2.0 - 4.0 g/dL Final    A-G Ratio 04/18/2019 0.7* 1.1 - 2.2   Final    Color 04/18/2019 YELLOW/STRAW    Final    Color Reference Range: Straw, Yellow or Dark Yellow    Appearance 04/18/2019 CLOUDY* CLEAR   Final    Specific gravity 04/18/2019 1.025  1.003 - 1.030   Final    pH (UA) 04/18/2019 5.5  5.0 - 8.0   Final    Protein 04/18/2019 30* NEG mg/dL Final    Glucose 04/18/2019 >1,000* NEG mg/dL Final    Ketone 04/18/2019 80* NEG mg/dL Final    Blood 04/18/2019 NEGATIVE   NEG   Final    Urobilinogen 04/18/2019 1.0  0.2 - 1.0 EU/dL Final    Nitrites 04/18/2019 NEGATIVE   NEG   Final    Leukocyte Esterase 04/18/2019 NEGATIVE   NEG   Final    WBC 04/18/2019 5-10  0 - 4 /hpf Final    RBC 04/18/2019 5-10  0 - 5 /hpf Final    Epithelial cells 04/18/2019 FEW  FEW /lpf Final    Epithelial cell category consists of squamous cells and /or transitional urothelial cells. Renal tubular cells, if present, are separately identified as such.  Bacteria 04/18/2019 1+* NEG /hpf Final    Yeast 04/18/2019 PRESENT* NEG   Final    Urine culture hold 04/18/2019 URINE ON HOLD IN MICROBIOLOGY DEPT FOR 3 DAYS. IF UNPRESERVED URINE IS SUBMITTED, IT CANNOT BE USED FOR ADDITIONAL TESTING AFTER 24 HRS, RECOLLECTION WILL BE REQUIRED. Final    SAMPLES BEING HELD 04/18/2019 [email or web address]   Final    COMMENT 04/18/2019 Add-on orders for these samples will be processed based on acceptable specimen integrity and analyte stability, which may vary by analyte. Final    Pregnancy test,urine (POC) 04/18/2019 NEGATIVE   NEG   Final    Bilirubin UA, confirm 04/18/2019 NEGATIVE   NEG   Final           Assessment/ Plan:   Diagnoses and all orders for this visit:    1. Type 2 diabetes with nephropathy (HCC)  -     AMB POC GLUCOSE BLOOD, BY GLUCOSE MONITORING DEVICE    2. Essential hypertension    3. Primary osteoarthritis of right knee    4. Obesity, morbid ([address]    5. Anxious mood        BP well controlled. Continue current regimen. DMII uncontrolled. LPN called in bydureon as alternative to Trulicity. Diet and lifestyle modification encouraged for weight loss.    NSAID and exercises for knee OA pain. Encouraged counseling for anxious mood/ stress management. Educated patient on red flag symptoms to warrant return to clinic or emergency room visit. I have discussed the diagnosis with the patient and the intended plan as seen in the above orders. The patient has been offered or received an after-visit summary and questions were answered concerning future plans. I have discussed medication side effects and warnings with the patient as well. Follow-up and Dispositions    · Return in about 3 months (around 9/25/2019) for Follow Up.            Signed,    Chiquita Pack MD  6/25/2019

## 2019-06-25 NOTE — PROGRESS NOTES
Chief Complaint   Patient presents with    Blood Pressure Check     follow up    High Blood Sugar     follow up     1. Have you been to the ER, urgent care clinic since your last visit? Hospitalized since your last visit? No    2. Have you seen or consulted any other health care providers outside of the [address] [place] [address] since your last visit? Include any pap smears or colon screening.  No

## 2020-02-12 DIAGNOSIS — I10 BENIGN ESSENTIAL HTN: ICD-10-CM

## 2020-02-12 NOTE — TELEPHONE ENCOUNTER
.Pharmacy is requesting a 90 day supply on the medication. .  Requested Prescriptions     Pending Prescriptions Disp Refills    hydroCHLOROthiazide (MICROZIDE) 12.5 mg capsule 90 Cap 1     Sig: Take 1 Cap by mouth daily. Get [facility] on file verified          LastRefill:3/13/19          LOV: Tuesday, June 25, 2019   UOV:

## 2020-02-14 RX ORDER — HYDROCHLOROTHIAZIDE 12.5 MG/1
12.5 CAPSULE ORAL DAILY
Qty: 90 CAP | Refills: 1 | Status: SHIPPED | OUTPATIENT
Start: 2020-02-14 | End: 2020-09-16 | Stop reason: SDUPTHER

## 2020-03-30 DIAGNOSIS — E11.65 UNCONTROLLED TYPE 2 DIABETES MELLITUS WITH HYPERGLYCEMIA (HCC): ICD-10-CM

## 2020-03-30 RX ORDER — ATORVASTATIN CALCIUM 40 MG/1
40 TABLET, FILM COATED ORAL DAILY
Qty: 30 TAB | Refills: 0 | Status: SHIPPED | OUTPATIENT
Start: 2020-03-30 | End: 2020-04-30 | Stop reason: SDUPTHER

## 2020-03-30 RX ORDER — INSULIN PUMP SYRINGE, 3 ML
EACH MISCELLANEOUS
Qty: 1 KIT | Refills: 0 | Status: SHIPPED | OUTPATIENT
Start: 2020-03-30

## 2020-03-30 RX ORDER — GLIPIZIDE 10 MG/1
10 TABLET ORAL 2 TIMES DAILY
Qty: 60 TAB | Refills: 0 | Status: SHIPPED | OUTPATIENT
Start: 2020-03-30 | End: 2020-04-30 | Stop reason: SDUPTHER

## 2020-03-30 NOTE — TELEPHONE ENCOUNTER
Pt requesting refill    . Requested Prescriptions     Pending Prescriptions Disp Refills    Blood-Glucose Meter monitoring kit 1 Kit 0     Sig: Fill with strips and lancets for bid testing. Uncontrolled type 2 DM E11.21    glucose blood VI test strips (blood glucose test) strip 300 Strip 4     Sig: Any brand, for uncontrolled DM E11.21 check up to 3x daily    glipiZIDE (GLUCOTROL) 10 mg tablet 180 Tab 1     Sig: Take 1 Tab by mouth two (2) times a day.  atorvastatin (LIPITOR) 40 mg tablet       Sig: Take 1 Tab by mouth daily.      [facility]#[phone]  LOV:Tuesday, June 25, 2019

## 2020-04-03 ENCOUNTER — TELEPHONE (OUTPATIENT)
Dept: FAMILY MEDICINE CLINIC | Age: 35
End: 2020-04-03

## 2020-04-03 NOTE — TELEPHONE ENCOUNTER
Outgoing call. No answer. Left message for patient regarding us going fully virtual on Monday and setting up [employer].         Nurse Note: need to set up [employer]

## 2020-04-29 DIAGNOSIS — E11.65 UNCONTROLLED TYPE 2 DIABETES MELLITUS WITH HYPERGLYCEMIA (HCC): ICD-10-CM

## 2020-04-30 RX ORDER — ATORVASTATIN CALCIUM 40 MG/1
TABLET, FILM COATED ORAL
Qty: 30 TAB | Refills: 0 | Status: SHIPPED | OUTPATIENT
Start: 2020-04-30 | End: 2020-05-19 | Stop reason: SDUPTHER

## 2020-04-30 RX ORDER — GLIPIZIDE 10 MG/1
TABLET ORAL
Qty: 60 TAB | Refills: 0 | Status: SHIPPED | OUTPATIENT
Start: 2020-04-30 | End: 2020-05-19 | Stop reason: SDUPTHER

## 2020-05-19 DIAGNOSIS — E11.65 UNCONTROLLED TYPE 2 DIABETES MELLITUS WITH HYPERGLYCEMIA (HCC): ICD-10-CM

## 2020-05-19 RX ORDER — GLIPIZIDE 10 MG/1
TABLET ORAL
Qty: 60 TAB | Refills: 0 | Status: SHIPPED | OUTPATIENT
Start: 2020-05-19 | End: 2020-09-16 | Stop reason: SDUPTHER

## 2020-05-19 RX ORDER — ATORVASTATIN CALCIUM 40 MG/1
TABLET, FILM COATED ORAL
Qty: 30 TAB | Refills: 0 | Status: SHIPPED | OUTPATIENT
Start: 2020-05-19 | End: 2020-09-18 | Stop reason: SDUPTHER

## 2020-05-19 NOTE — TELEPHONE ENCOUNTER
Patient requesting to change rx to 90 d/s    Last Visit: 6/25/19  MD Jayjay Werner  Next Appointment: not scheduled  Previous Refill Encounter(s): 4/30/20  (both) 30 and 60    Requested Prescriptions     Pending Prescriptions Disp Refills    atorvastatin (LIPITOR) 40 mg tablet 90 Tab 0     Sig: TAKE 1 TABLET BY MOUTH EVERY DAY    glipiZIDE (GLUCOTROL) 10 mg tablet 180 Tab 0     Sig: TAKE 1 TABLET BY MOUTH TWICE A DAY

## 2020-05-20 NOTE — TELEPHONE ENCOUNTER
Outbound call placed to patient. Name and  verified.  Patient scheduled for Friday, May 22, 2020 02:30 PM

## 2020-05-22 ENCOUNTER — VIRTUAL VISIT (OUTPATIENT)
Dept: FAMILY MEDICINE CLINIC | Age: 35
End: 2020-05-22

## 2020-05-22 DIAGNOSIS — I15.2 HYPERTENSION ASSOCIATED WITH DIABETES (HCC): ICD-10-CM

## 2020-05-22 DIAGNOSIS — E66.01 OBESITY, MORBID (HCC): ICD-10-CM

## 2020-05-22 DIAGNOSIS — F33.2 SEVERE EPISODE OF RECURRENT MAJOR DEPRESSIVE DISORDER, WITHOUT PSYCHOTIC FEATURES (HCC): ICD-10-CM

## 2020-05-22 DIAGNOSIS — E11.59 HYPERTENSION ASSOCIATED WITH DIABETES (HCC): ICD-10-CM

## 2020-05-22 DIAGNOSIS — E78.5 HYPERLIPIDEMIA, UNSPECIFIED HYPERLIPIDEMIA TYPE: ICD-10-CM

## 2020-05-22 DIAGNOSIS — E11.21 TYPE 2 DIABETES WITH NEPHROPATHY (HCC): Primary | ICD-10-CM

## 2020-05-22 DIAGNOSIS — R45.851 SUICIDAL IDEATION: ICD-10-CM

## 2020-05-22 NOTE — PATIENT INSTRUCTIONS
If you or someone you know is having thoughts of suicide please go to the nearest emergency room or call [phone] ([phone]) or [phone] ([phone]) for help. Learning About Mindfulness for Stress What are mindfulness and stress? Stress is what you feel when you have to handle more than you are used to. A lot of things can cause stress. You may feel stress when you go on a job interview, take a test, or run a race. This kind of short-term stress is normal and even useful. It can help you if you need to work hard or react quickly. Stress also can last a long time. Long-term stress is caused by stressful situations or events. Examples of long-term stress include long-term health problems, ongoing problems at work, and conflicts in your family. Long-term stress can harm your health. Mindfulness is a focus only on things happening in the present moment. It's a process of purposefully paying attention to and being aware of your surroundings, your emotions, your thoughts, and how your body feels. You are aware of these things, but you aren't judging these experiences as \"good\" or \"bad. \" Mindfulness can help you learn to calm your mind and body to help you cope with illness, pain, and stress. How does mindfulness help to relieve stress? Mindfulness can help quiet your mind and relax your body. Studies show that it can help some people sleep better, feel less anxious, and bring their blood pressure down. And it's been shown to help some people live and cope better with certain health problems like heart disease, depression, chronic pain, and cancer. How do you practice mindfulness? To be mindful is to pay attention, to be present, and to be accepting. · When you're mindful, you do just one thing and you pay close attention to that one thing. For example, you may sit quietly and notice your emotions or how your food tastes and smells. · When you're present, you focus on the things that are happening right now. You let go of your thoughts about the past and the future. When you dwell on the past or the future, you miss moments that can heal and strengthen you. You may miss moments like hearing a child laugh or seeing a friendly face when you think you're all alone. · When you're accepting, you don't  the present moment. Instead you accept your thoughts and feelings as they come. You can practice anytime, anywhere, and in any way you choose. You can practice in many ways. Here are a few ideas: · While doing your chores, like washing the dishes, let your mind focus on what's in your hand. What does the dish feel like? Is the water warm or cold? · Go outside and take a few deep breaths. What is the air like? Is it warm or cold? · When you can, take some time at the start of your day to sit alone and think. · Take a slow walk by yourself. Count your steps while you breathe in and out. · Try yoga breathing exercises, stretches, and poses to strengthen and relax your muscles. · At work, if you can, try to stop for a few moments each hour. Note how your body feels. Let yourself regroup and let your mind settle before you return to what you were doing. · If you struggle with anxiety or \"worry thoughts,\" imagine your mind as a blue kraig and your worry thoughts as clouds. Now imagine those worry thoughts floating across your mind's kraig. Just let them pass by as you watch. Follow-up care is a key part of your treatment and safety. Be sure to make and go to all appointments, and call your doctor if you are having problems. It's also a good idea to know your test results and keep a list of the medicines you take. Where can you learn more? Go to http://bravo-alex.info/ Enter P733 in the search box to learn more about \"Learning About Mindfulness for Stress. \" Current as of: December 15, 2019Content Version: 12.4 © 6529-6708 [employer]wise, Incorporated. Care instructions adapted under license by [employer] (which disclaims liability or warranty for this information). If you have questions about a medical condition or this instruction, always ask your healthcare professional. [address] any warranty or liability for your use of this information. Weight Loss Tips: 
Remember this is like a part time job so your motivation and commitment is key to your success. Mindset Weight loss like any other behavior change starts in your mind. Think hard about what your motivates you to lose weight then meditate on that. Remind yourself of your motivation 
with phone alarms, scheduled meditation time, vision board, journal- just to name a few ideas. Have realistic goals. We expect with diligent healthy diet and physical activity you can lose 5% of your body weight in 3 
months. Wt in lbs x 0.05 = #lbs you should lose in 3 months. Make food and activity changes with a goal of CONSISTENCY not perfection. Food Start eating differently. Most of your weight loss and gain is from what you eat. Use small plates only Drink 2 liters (1/2 gallon) of water every day HALF of every meal should be fruit or vegetables Try meal prepping on Sunday (or your day off) with new different vegetables. Consider meal prep service such as Cleaneatz.[employer], wepremeals. com Replace soda with diet soda or other zero sugar drinks (selter water just fine) Consider using the [employer] netta for calorie counting. Goal [id number] calories per day Activity Staying physically active will help you lose more weight and can help you get over the plateau when you weight just 
won't change any more with diet. Start exercise at least 5 days per week for 40 minutes. Consider [employer] training netta for home exercises. You can start with walking.  I suggest walking at a speed of at least 3.5-4. 5mph to for the weight loss benefit. Increase your speed or distance every 2 weeks. Do some slow stretching daily of legs, arms and back. Consider adding weight training with light weights at home or at the gym. See a doctor or a physical training for 
instructions in order to avoid injuries from doing muscle training incorrectly. Free fitness program in A: [employer] [email or web address] 
 
 
  

## 2020-05-22 NOTE — PROGRESS NOTES
Maria R Wheatley [facility] Note      Subjective:     Chief Complaint   Patient presents with    Follow Up Chronic Condition     Padmini Combs is a 30y.o. year old female who presents for virtual evaluation of the following:      DMII:   A1C  6.5 > 7.3> 10.8 >13  Home monitorin, 209, 290, 196, 250 from memory  Key Antihyperglycemic Medications             glipiZIDE (GLUCOTROL) 10 mg tablet TAKE 1 TABLET BY MOUTH TWICE A DAY    exenatide microspheres 2 mg serr 2 mg by SubCUTAneous route every seven (7) days. metFORMIN ER (GLUCOPHAGE XR) 500 mg tablet TAKE 2 TABLETS BY MOUTH TWICE A DAY      Preivoius Tx: faxigo (stopped due to yeast infection)  Eye: Done   Lab Results   Component Value Date/Time    Hemoglobin A1c 13.3 (H) 05/10/2019 11:38 AM    Hemoglobin A1c (POC) 10.8 2017 07:29 PM        HTN:   Chronic > 1 year  No home monitoring log in office. Key CAD CHF Meds             atorvastatin (LIPITOR) 40 mg tablet TAKE 1 TABLET BY MOUTH EVERY DAY    hydroCHLOROthiazide (MICROZIDE) 12.5 mg capsule Take 1 Cap by mouth daily. lisinopril (PRINIVIL, ZESTRIL) 5 mg tablet Take 1 Tab by mouth daily. Denies chest pain, shortness of breath, leg swelling  BP Readings from Last 3 Encounters:   19 122/88   05/10/19 124/86   19 (!) 155/94       Obesity:   Diet: unrestricted  Previous Attempt: forskolin, zehra rosiee  Actiivyt: None  BMI 64  Last Weight Metrics:  Weight Loss Metrics 2019 5/10/2019 2019 2/15/2019 2019 2017 2017   Today's Wt 332 lb 12.8 oz 332 lb 331 lb 2.1 oz 342 lb 338 lb 332 lb 12.8 oz 332 lb 6 oz   BMI 60.87 kg/m2 60.72 kg/m2 60.56 kg/m2 64.62 kg/m2 63.86 kg/m2 62.88 kg/m2 62.8 kg/m2        Depression and Anxiety:  Mood: depression  Triggers: relatiion  Endorses recent active thoughts of suicide. Currently only passive suicidal thoughts.     Previsou Plan for SI- thought about using all diabetes meds or knife to hurt herself. Did not follow through with this since she talked to family members about her problems. No history of admission  Managed by PCP  Key Psychotherapeutic Meds     Patient is on no psychotherapeutic meds. ELSIE:11> 9> 9  3 most recent PHQ Screens 5/22/2020   Little interest or pleasure in doing things Several days   Feeling down, depressed, irritable, or hopeless Several days   Total Score PHQ 2 2   Trouble falling or staying asleep, or sleeping too much More than half the days   Feeling tired or having little energy Not at all   Poor appetite, weight loss, or overeating Several days   Feeling bad about yourself - or that you are a failure or have let yourself or your family down More than half the days   Trouble concentrating on things such as school, work, reading, or watching TV Several days   Moving or speaking so slowly that other people could have noticed; or the opposite being so fidgety that others notice Not at all   Thoughts of being better off dead, or hurting yourself in some way More than half the days   PHQ 9 Score 10   How difficult have these problems made it for you to do your work, take care of your home and get along with others Very difficult        Care Team:   Dentist- None  Optho- None  GYN- None        Social:   Works [employer]  Lives with mother       Review of Systems   Pertinent positives and negative per HPI. All other systems  reviewed are negative for a Comprehensive ROS (10+).        Past Medical History:   Diagnosis Date    Gallstones     History of chickenpox 6/11/2010    Hyperlipidemia 6/24/2014    Hypertension     Not on any meds    Morbid obesity ([address]     CH (nonalcoholic steatohepatitis) 1/13/2016        Social History     Socioeconomic History    Marital status: SINGLE     Spouse name: Not on file    Number of children: Not on file    Years of education: Not on file    Highest education level: Not on file   Occupational History    Not on file Social Needs    Financial resource strain: Not on file    Food insecurity     Worry: Not on file     Inability: Not on file    Transportation needs     Medical: Not on file     Non-medical: Not on file   Tobacco Use    Smoking status: Never Smoker    Smokeless tobacco: Never Used   Substance and Sexual Activity    Alcohol use: Yes     Alcohol/week: 0.0 standard drinks     Comment: occasional etoh    Drug use: No     Types: Prescription    Sexual activity: Yes   Lifestyle    Physical activity     Days per week: Not on file     Minutes per session: Not on file    Stress: Not on file   Relationships    Social connections     Talks on phone: Not on file     Gets together: Not on file     Attends Alevism service: Not on file     Active member of club or organization: Not on file     Attends meetings of clubs or organizations: Not on file     Relationship status: Not on file    Intimate partner violence     Fear of current or ex partner: Not on file     Emotionally abused: Not on file     Physically abused: Not on file     Forced sexual activity: Not on file   Other Topics Concern    Not on file   Social History Narrative    Not on file       Family History   Problem Relation Age of Onset    High Cholesterol Mother     Diabetes Father     Heart Failure Father     Hypertension Father     Hypertension Maternal Grandmother     Emphysema Maternal Grandfather     Diabetes Maternal Grandfather     Hypertension Maternal Grandfather     Cancer Maternal Grandfather         prostate cancer    Heart Disease Maternal Aunt        Current Outpatient Medications   Medication Sig    atorvastatin (LIPITOR) 40 mg tablet TAKE 1 TABLET BY MOUTH EVERY DAY    glipiZIDE (GLUCOTROL) 10 mg tablet TAKE 1 TABLET BY MOUTH TWICE A DAY    Blood-Glucose Meter monitoring kit Fill with strips and lancets for bid testing.  Uncontrolled type 2 DM E11.21    glucose blood VI test strips (blood glucose test) strip Any brand, for uncontrolled DM E11.21 check up to 3x daily    hydroCHLOROthiazide (MICROZIDE) 12.5 mg capsule Take 1 Cap by mouth daily.  lisinopril (PRINIVIL, ZESTRIL) 5 mg tablet Take 1 Tab by mouth daily.  exenatide microspheres 2 mg serr 2 mg by SubCUTAneous route every seven (7) days.  metFORMIN ER (GLUCOPHAGE XR) 500 mg tablet TAKE 2 TABLETS BY MOUTH TWICE A DAY     No current facility-administered medications for this visit. Objective: There were no vitals filed for this visit. Vitals measurement not available     Physical Examination:  General: Alert, cooperative, no distress, appears stated age. Obese  Eyes: Conjunctivae clear. Pupils equally round. Extraocular muscles intact. Ears: Normal appearing external ear   Nose: Nares normal appearing  Mouth/Throat: Lips, mucosa, and tongue normal. Moist mucous membranes. No tonsillar enlargement noted. Neck: Supple, symmetrical, trachea midline, no neck mass visualized. Respiratory: Breathing comfortably, in no acute respiratory distress. Cardiovascular: Visualized extremities without edema. MSK: Upper extremities normal appearing. Skin: No significant erythematous lesions or discoloration noted on facial skin. No rashes or lesions on exposed skin. Neurologic: No facial asymmetry. Normal gaze. Cranial nerves intact. Psychiatric: Affect flat. Mood depressed. Thoughts logical. Speech volume and speed normal. No hallucinations. Well kempt. Endorses recent suicidal ideation, no active plan      No visits with results within 3 Month(s) from this visit. Latest known visit with results is:   Office Visit on 06/25/2019   Component Date Value Ref Range Status    Glucose POC 06/25/2019 361  mg/dL Final         Assessment/ Plan:   Diagnoses and all orders for this visit:    1. Type 2 diabetes with nephropathy (HCC)  -     METABOLIC PANEL, COMPREHENSIVE  -     exenatide microspheres 2 mg serr; 2 mg by SubCUTAneous route every seven (7) days.   - HEMOGLOBIN A1C WITH EAG  -     MICROALBUMIN, UR, RAND W/ MICROALB/CREAT RATIO  -     LIPID PANEL    2. Severe episode of recurrent major depressive disorder, without psychotic features ([facility] [facility]  -     REFERRAL TO BEHAVIORAL HEALTH  -     sertraline (ZOLOFT) 50 mg tablet; Take 1 Tab by mouth daily. Take 1/2 tab for 1 week then take 1 tab daily. 3. Suicidal ideation  -     REFERRAL TO BEHAVIORAL HEALTH  -     sertraline (ZOLOFT) 50 mg tablet; Take 1 Tab by mouth daily. Take 1/2 tab for 1 week then take 1 tab daily. 4. Hypertension associated with diabetes ([facility]  -     METABOLIC PANEL, COMPREHENSIVE  -     LIPID PANEL    5. Obesity, morbid ([facility]    6. Hyperlipidemia, unspecified hyperlipidemia type  -     LIPID PANEL        Severe uncontrolled depression and anxiety with recent suicidal ideation. Start sertraline. Urgent referral to behavioral therapist.   Offered to provide patent with Suicide hotline. States she already has this number. Contract for Safety:  Recent suicidal thoughts. None currently. Motivation to live: Haydee Russell of Action if Thought Recurs: Talking about thought and problems out with boyfriend, call 911, call PAFp    Diabetes uncontrolled per home readings. Labs to monitor. Refilled bydureon which patient has run out of > 3 months ago. Will mail lab requisition to be done offsite. HTN with HLD, chronic asymptomatic. Unable to monitor via virtual visit. Labs to monitor. Diet and lifestyle modification encouraged for weight loss. We discussed the expected course, resolution and complications of the diagnosis(es) in detail. Medication risks, benefits, costs, interactions, and alternatives were discussed as indicated. I advised her to contact the office if her condition worsens, changes or fails to improve as anticipated. She expressed understanding with the diagnosis(es) and plan.        Gurpreet Hogue is a 30 y.o. female being evaluated by a video visit encounter for concerns as above. A caregiver was present when appropriate. Due to this being a TeleHealth encounter (During [facility]- public [facility] emergency), evaluation of the following organ systems was limited: Vitals/Constitutional/EENT/Resp/CV/GI//MS/Neuro/Skin/Heme-Lymph-Imm. Pursuant to the emergency declaration under the [address] [place] [address], [facility] waiver authority and the [employer] Act, this Virtual  Visit was conducted, with patient's (and/or legal guardian's) consent, to reduce the patient's risk of exposure to COVID-19 and provide necessary medical care. Services were provided through a video synchronous discussion virtually to substitute for in-person clinic visit. Provider was at home while conducting this encounter. Patient was at home during encounter. Other persons participating in call: None  Consent:  She and/or her healthcare decision maker is aware that this patient-initiated Telehealth encounter is a billable service, with coverage as determined by her insurance carrier. She is aware that she may receive a bill and has provided verbal consent to proceed: Yes  This virtual visit was conducted via D[employer]. me. Pursuant to the emergency declaration under the [address] [place] [address], [facility] waiver authority and the [employer] Act, this Virtual  Visit was conducted to reduce the patient's risk of exposure to COVID-19 and provide continuity of care for an established patient. Services were provided through a video synchronous discussion virtually to substitute for in-person clinic visit. Due to this being a TeleHealth evaluation, many elements of the physical examination are unable to be assessed. Total Time: minutes: [address]       Educated patient on red flag symptoms to warrant return to clinic or emergency room visit.     I have discussed the diagnosis with the patient and the intended plan as seen in the above orders. The patient has been offered or received an after-visit summary and questions were answered concerning future plans. I have discussed medication side effects and warnings with the patient as well. Follow-up and Dispositions    · Return in about 2 weeks (around 6/5/2020) for Follow Up.          Signed,    Anupam Robison MD  5/22/2020

## 2020-05-25 RX ORDER — SERTRALINE HYDROCHLORIDE 50 MG/1
50 TABLET, FILM COATED ORAL DAILY
Qty: 90 TAB | Refills: 0 | Status: SHIPPED | OUTPATIENT
Start: 2020-05-25 | End: 2020-09-16

## 2020-05-26 ENCOUNTER — TELEPHONE (OUTPATIENT)
Dept: FAMILY MEDICINE CLINIC | Age: 35
End: 2020-05-26

## 2020-05-26 NOTE — TELEPHONE ENCOUNTER
Lab letter sent. Outbound call placed to patient. No answer. Left message for patient to give us a call back.

## 2020-05-26 NOTE — TELEPHONE ENCOUNTER
----- Message from Charli Mojica MD sent at 5/25/2020 10:42 AM EDT -----  Mail AVS  Mail lab requisition for lab to be done at local lab [facility]- notify patient  Call patient to ask if mood improved. If not, schedule follow up telephone visit with me since recently suicidal thoughts.     Schedule EOC visit with cameron kumar  Schedule virtual follow up with me in 2 weeks

## 2020-06-03 ENCOUNTER — VIRTUAL VISIT (OUTPATIENT)
Dept: FAMILY MEDICINE CLINIC | Age: 35
End: 2020-06-03

## 2020-06-03 DIAGNOSIS — F33.0 MAJOR DEPRESSIVE DISORDER, RECURRENT EPISODE, MILD (HCC): Primary | ICD-10-CM

## 2020-06-03 NOTE — PROGRESS NOTES
Virtual Initial Appt:  Met with Ms. Hang Bridges today after a referral was made by her PCP, Dr. Shannan Zamora for depressive symptoms and grief counseling. Ms. Hang Bridges is a 30year old female that reports she was referred to this clinician after she \"answered questions asked by my Dr. Brionna Carrillo made her worry about me. \"  Ms. Hang Bridges states that she lost her grandmother last year and \"has not been the same since then. \"  According to Ms. Hang Bridges, this maternal grandmother helped raise this patient and they had a special relationship. She talks more fondly of this relationship than of her relationship with her biological mother. Ms. Hang Bridges resides with her mother and the patient's live in boyfriend (of 1 year). This relationship with the boyfriend also makes the patient upset. She states that he tells her he is not happy and doesn't want to be with her anymore-(but he has no other place to live). Ms. Hang Bridges states that after her grandmother , her boyfriend told her she was \"crazy and acting stupid\" because she would get tearful, angry and moore. Ms. Hang Bridges states that she was just grieving her g.mother's death and she didn't have anyone to talk to about this loss. Additionally, Ms. Hang Bridges has lost her father in  when he  after being in a diabetic coma. Her biological mother, 61years old does not seem to be a support to this patient. Ms. Hang Bridges received her high school diploma and has no other education. 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 She has worked there for the past 12 years. Ms. Hang Bridges is alert and oriented X3. She maintains fair to poor eye contact and has a flat affect throughout the interview. She has fair to poor insight with fair judgement. She is low to average intelligence. She denies any current suicidal or homicidal ideations, however, she is quite sad and admits to past thoughts of self harm.   She is not reporting any current self destructive behaviors. Ms. Gerhard Dance is not psychotic or delusional.  She was provided with crisis information should she feel suicidal in the future. She is agreeable to meeting with this therapist again and an appt will be scheduled when she calls the office. This patient is a fair candidate for therapy as she may not want to follow thru with the counseling process.   Penny Wilson LCSW

## 2020-08-19 DIAGNOSIS — E11.65 UNCONTROLLED TYPE 2 DIABETES MELLITUS WITH HYPERGLYCEMIA (HCC): ICD-10-CM

## 2020-08-19 NOTE — TELEPHONE ENCOUNTER
Last Visit: VV 5/22/20  MD Hemal Jimenez  Next Appointment: Not scheduled  Previous Refill Encounter(s): 5/10/19  120 + 5    Requested Prescriptions     Pending Prescriptions Disp Refills    metFORMIN ER (GLUCOPHAGE XR) 500 mg tablet 120 Tab 5     Sig: TAKE 2 TABLETS BY MOUTH TWICE A DAY

## 2020-08-20 RX ORDER — METFORMIN HYDROCHLORIDE 500 MG/1
TABLET, EXTENDED RELEASE ORAL
Qty: 60 TAB | Refills: 0 | Status: SHIPPED | OUTPATIENT
Start: 2020-08-20 | End: 2020-09-16 | Stop reason: SDUPTHER

## 2020-08-20 NOTE — TELEPHONE ENCOUNTER
----- Message from Thu Peña sent at 8/20/2020  2:17 PM EDT -----  Regarding: Dr. Chaudhari Linear: [phone]  Caller's first and last name: Pt   Reason for call: Received call from pharmacy stating that PCP has not refilled prescription and would like to know why. Callback required yes/no and why: yes  Best contact number(s): [phone]  Details to clarify the request: Pharmacy is [address] [place] [address], [phone]. Medication is Metformin 500mg.      Pt had a virtual visit in May 2020

## 2020-09-16 ENCOUNTER — OFFICE VISIT (OUTPATIENT)
Dept: FAMILY MEDICINE CLINIC | Age: 35
End: 2020-09-16

## 2020-09-16 VITALS
RESPIRATION RATE: 18 BRPM | HEART RATE: 80 BPM | HEIGHT: 62 IN | BODY MASS INDEX: 53.92 KG/M2 | OXYGEN SATURATION: 97 % | SYSTOLIC BLOOD PRESSURE: 138 MMHG | DIASTOLIC BLOOD PRESSURE: 82 MMHG | WEIGHT: 293 LBS

## 2020-09-16 DIAGNOSIS — Z23 ENCOUNTER FOR IMMUNIZATION: ICD-10-CM

## 2020-09-16 DIAGNOSIS — Z78.9 ATTEMPTING TO CONCEIVE: ICD-10-CM

## 2020-09-16 DIAGNOSIS — E78.5 HYPERLIPIDEMIA, UNSPECIFIED HYPERLIPIDEMIA TYPE: ICD-10-CM

## 2020-09-16 DIAGNOSIS — I15.2 HYPERTENSION ASSOCIATED WITH DIABETES (HCC): ICD-10-CM

## 2020-09-16 DIAGNOSIS — E11.59 HYPERTENSION ASSOCIATED WITH DIABETES (HCC): ICD-10-CM

## 2020-09-16 DIAGNOSIS — Z00.00 WELL WOMAN EXAM (NO GYNECOLOGICAL EXAM): Primary | ICD-10-CM

## 2020-09-16 DIAGNOSIS — E11.65 UNCONTROLLED TYPE 2 DIABETES MELLITUS WITH HYPERGLYCEMIA (HCC): ICD-10-CM

## 2020-09-16 DIAGNOSIS — E11.21 TYPE 2 DIABETES WITH NEPHROPATHY (HCC): ICD-10-CM

## 2020-09-16 DIAGNOSIS — E66.01 OBESITY, MORBID (HCC): ICD-10-CM

## 2020-09-16 LAB — HBA1C MFR BLD HPLC: 12 %

## 2020-09-16 PROCEDURE — 83036 HEMOGLOBIN GLYCOSYLATED A1C: CPT | Performed by: FAMILY MEDICINE

## 2020-09-16 PROCEDURE — 90471 IMMUNIZATION ADMIN: CPT | Performed by: FAMILY MEDICINE

## 2020-09-16 PROCEDURE — 90732 PPSV23 VACC 2 YRS+ SUBQ/IM: CPT

## 2020-09-16 PROCEDURE — 99213 OFFICE O/P EST LOW 20 MIN: CPT | Performed by: FAMILY MEDICINE

## 2020-09-16 PROCEDURE — 99395 PREV VISIT EST AGE 18-39: CPT | Performed by: FAMILY MEDICINE

## 2020-09-16 PROCEDURE — 90472 IMMUNIZATION ADMIN EACH ADD: CPT

## 2020-09-16 RX ORDER — HYDROCHLOROTHIAZIDE 12.5 MG/1
12.5 CAPSULE ORAL DAILY
Qty: 90 CAP | Refills: 1 | Status: SHIPPED | OUTPATIENT
Start: 2020-09-16 | End: 2021-02-09 | Stop reason: SDUPTHER

## 2020-09-16 RX ORDER — METFORMIN HYDROCHLORIDE 500 MG/1
TABLET, EXTENDED RELEASE ORAL
Qty: 60 TAB | Refills: 0 | Status: SHIPPED | OUTPATIENT
Start: 2020-09-16 | End: 2020-09-25

## 2020-09-16 RX ORDER — LISINOPRIL 5 MG/1
5 TABLET ORAL DAILY
Qty: 90 TAB | Refills: 1 | Status: SHIPPED | OUTPATIENT
Start: 2020-09-16 | End: 2021-02-09 | Stop reason: SDUPTHER

## 2020-09-16 RX ORDER — PIOGLITAZONEHYDROCHLORIDE 30 MG/1
30 TABLET ORAL DAILY
Qty: 90 TAB | Refills: 1 | Status: SHIPPED | OUTPATIENT
Start: 2020-09-16 | End: 2020-10-14 | Stop reason: SDUPTHER

## 2020-09-16 RX ORDER — GLIPIZIDE 10 MG/1
TABLET ORAL
Qty: 60 TAB | Refills: 0 | Status: SHIPPED | OUTPATIENT
Start: 2020-09-16 | End: 2021-01-15 | Stop reason: SDUPTHER

## 2020-09-16 NOTE — PROGRESS NOTES
Identified pt with two pt identifiers(name and ). Reviewed record in preparation for visit and have obtained necessary documentation. Chief Complaint   Patient presents with    Diabetes     F/U        Health Maintenance Due   Topic    Pneumococcal 0-64 years (1 of 1 - PPSV23)    Eye Exam Retinal or Dilated     A1C test (Diabetic or Prediabetic)     Foot Exam Q1     MICROALBUMIN Q1     Lipid Screen      Pneumococcal: Pt denied  DTaP: Pt denied  Flu vaccine: Pt denied  Eye exam: 2019      Visit Vitals  /82   Pulse 80   Resp 18   Ht 5' 2\" (1.575 m)   Wt 303 lb 12.8 oz (137.8 kg)   SpO2 97%   BMI 55.57 kg/m²         Coordination of Care Questionnaire:  :   1) Have you been to an emergency room, urgent care, or hospitalized since your last visit? If yes, where when, and reason for visit? Yes, 2020 and 8/10/2020, [facility], right thumb surgery       2. Have seen or consulted any other health care provider since your last visit? If yes, where when, and reason for visit? NO      Patient is accompanied by self I have received verbal consent from Darrell Palomo to discuss any/all medical information while they are present in the room.

## 2020-09-16 NOTE — PROGRESS NOTES
Marisabel Saldana [facility] Note      Subjective:     Chief Complaint   Patient presents with    Diabetes     F/U     Valeria Cruz is a 30y.o. year old female who presents for virtual evaluation of the following:      DMII with Microalbuminuria:  Chronic  Home monitoring: No log in office  Key Antihyperglycemic Medications             metFORMIN ER (GLUCOPHAGE XR) 500 mg tablet (Taking) TAKE 2 TABLETS BY MOUTH TWICE A DAY. Office visit with labs due for refills    exenatide microspheres 2 mg serr (Taking) 2 mg by SubCUTAneous route every seven (7) days. glipiZIDE (GLUCOTROL) 10 mg tablet (Taking) TAKE 1 TABLET BY MOUTH TWICE A DAY      - bydureonstarted 2 weeks ago  Preivoius Tx: Brigette Licona (stopped due to yeast infection)  Complication: Microalbuminuria   Eye: Done   Lab Results   Component Value Date/Time    Hemoglobin A1c 13.3 (H) 05/10/2019 11:38 AM    Hemoglobin A1c (POC) 12.0 09/16/2020 11:17 AM        HTN with DMII:   Chronic > 1 year  No home monitoring log in office. Key CAD CHF Meds             atorvastatin (LIPITOR) 40 mg tablet (Taking) TAKE 1 TABLET BY MOUTH EVERY DAY    hydroCHLOROthiazide (MICROZIDE) 12.5 mg capsule (Taking) Take 1 Cap by mouth daily. lisinopril (PRINIVIL, ZESTRIL) 5 mg tablet (Taking) Take 1 Tab by mouth daily.       Denies chest pain, shortness of breath, leg swelling  BP Readings from Last 3 Encounters:   09/16/20 138/82   06/25/19 122/88   05/10/19 124/86       Obesity:   Diet: unrestricted  Previous Attempt: forskolin, zehra clense  Actiivyt: Walking 3 days per week with Fredy  BMI 64  Last Weight Metrics:  Weight Loss Metrics 9/16/2020 6/25/2019 5/10/2019 4/18/2019 2/15/2019 1/9/2019 9/8/2017   Today's Wt 303 lb 12.8 oz 332 lb 12.8 oz 332 lb 331 lb 2.1 oz 342 lb 338 lb 332 lb 12.8 oz   BMI 55.57 kg/m2 60.87 kg/m2 60.72 kg/m2 60.56 kg/m2 64.62 kg/m2 63.86 kg/m2 62.88 kg/m2        Depression and Anxiety:  Mood: better  Previous Trigger: relationship issues   No active suicidal aideation  No history of admission  Managed by PCP  Key Psychotherapeutic Meds             sertraline (ZOLOFT) 50 mg tablet Take 1 Tab by mouth daily. Take 1/2 tab for 1 week then take 1 tab daily.       - did nto tke sretline due ot difficyutly filling medicaiton  ELSIE:11> 9> 9>11  3 most recent PHQ Screens 9/16/2020   Little interest or pleasure in doing things Not at all   Feeling down, depressed, irritable, or hopeless Several days   Total Score PHQ 2 1   Trouble falling or staying asleep, or sleeping too much Not at all   Feeling tired or having little energy Several days   Poor appetite, weight loss, or overeating Several days   Feeling bad about yourself - or that you are a failure or have let yourself or your family down More than half the days   Trouble concentrating on things such as school, work, reading, or watching TV Not at all   Moving or speaking so slowly that other people could have noticed; or the opposite being so fidgety that others notice Not at all   Thoughts of being better off dead, or hurting yourself in some way Not at all   PHQ 9 Score 5   How difficult have these problems made it for you to do your work, take care of your home and get along with others -       Acute Conern:   Attempting to Conceive:  Started having unpretected sex in 6/2020  Periods are regular  Requests referral to fertility specialist.       Health Maintenance:   Health Maintenance   Topic Date Due    Pneumococcal 0-64 years (1 of 1 - PPSV23) 12/24/1993    DTaP/Tdap/Td series (1 - Tdap) 12/24/2008    Eye Exam Retinal or Dilated  06/30/2017    Foot Exam Q1  01/09/2020    MICROALBUMIN Q1  01/09/2020    Lipid Screen  01/09/2020    A1C test (Diabetic or Prediabetic)  05/10/2020    Flu Vaccine (1) 09/01/2020    PAP AKA CERVICAL CYTOLOGY  01/09/2024       HIV or other STD testing: Declined  Domestic Violence Screen:   Abuse Screening Questionnaire 5/10/2019   Do you ever feel afraid of your partner? N   Are you in a relationship with someone who physically or mentally threatens you? N   Is it safe for you to go home? Y       Depression Screen:   3 most recent PHQ Screens 9/16/2020   Little interest or pleasure in doing things Not at all   Feeling down, depressed, irritable, or hopeless Several days   Total Score PHQ 2 1   Trouble falling or staying asleep, or sleeping too much -   Feeling tired or having little energy -   Poor appetite, weight loss, or overeating -   Feeling bad about yourself - or that you are a failure or have let yourself or your family down -   Trouble concentrating on things such as school, work, reading, or watching TV -   Moving or speaking so slowly that other people could have noticed; or the opposite being so fidgety that others notice -   Thoughts of being better off dead, or hurting yourself in some way -   PHQ 9 Score -   How difficult have these problems made it for you to do your work, take care of your home and get along with others -       Smoker? never    G0  Pap:  Not due  Mammogram: not due  Patient's last menstrual period was 09/06/2020. Menses Monthly, lasting 5 days. No recent worsening bleeding or intermenstrual bleeding   reports being sexually active. Care Team:   Dentist- None  Optho-   GYN- None        Social:   Works [employer]  Lives with mother       Review of Systems   Pertinent positives and negative per HPI. All other systems  reviewed are negative for a Comprehensive ROS (10+).        Past Medical History:   Diagnosis Date    Gallstones     History of chickenpox 6/11/2010    Hyperlipidemia 6/24/2014    Hypertension     Not on any meds    Morbid obesity ([facility] [address]     CH (nonalcoholic steatohepatitis) 1/13/2016        Social History     Socioeconomic History    Marital status: SINGLE     Spouse name: Not on file    Number of children: Not on file    Years of education: Not on file    Highest education level: Not on file   Occupational History    Not on file   Social Needs    Financial resource strain: Not on file    Food insecurity     Worry: Not on file     Inability: Not on file    Transportation needs     Medical: Not on file     Non-medical: Not on file   Tobacco Use    Smoking status: Never Smoker    Smokeless tobacco: Never Used   Substance and Sexual Activity    Alcohol use: Yes     Alcohol/week: 0.0 standard drinks     Comment: occasional etoh    Drug use: No     Types: Prescription    Sexual activity: Yes   Lifestyle    Physical activity     Days per week: Not on file     Minutes per session: Not on file    Stress: Not on file   Relationships    Social connections     Talks on phone: Not on file     Gets together: Not on file     Attends Yarsani service: Not on file     Active member of club or organization: Not on file     Attends meetings of clubs or organizations: Not on file     Relationship status: Not on file    Intimate partner violence     Fear of current or ex partner: Not on file     Emotionally abused: Not on file     Physically abused: Not on file     Forced sexual activity: Not on file   Other Topics Concern    Not on file   Social History Narrative    Not on file       Family History   Problem Relation Age of Onset    High Cholesterol Mother     Diabetes Father     Heart Failure Father     Hypertension Father     Hypertension Maternal Grandmother     Emphysema Maternal Grandfather     Diabetes Maternal Grandfather     Hypertension Maternal Grandfather     Cancer Maternal Grandfather         prostate cancer    Heart Disease Maternal Aunt        Current Outpatient Medications   Medication Sig    metFORMIN ER (GLUCOPHAGE XR) 500 mg tablet TAKE 2 TABLETS BY MOUTH TWICE A DAY. Office visit with labs due for refills    exenatide microspheres 2 mg serr 2 mg by SubCUTAneous route every seven (7) days.     atorvastatin (LIPITOR) 40 mg tablet TAKE 1 TABLET BY MOUTH EVERY DAY    glipiZIDE (GLUCOTROL) 10 mg tablet TAKE 1 TABLET BY MOUTH TWICE A DAY    Blood-Glucose Meter monitoring kit Fill with strips and lancets for bid testing. Uncontrolled type 2 DM E11.21    glucose blood VI test strips (blood glucose test) strip Any brand, for uncontrolled DM E11.21 check up to 3x daily    hydroCHLOROthiazide (MICROZIDE) 12.5 mg capsule Take 1 Cap by mouth daily.  lisinopril (PRINIVIL, ZESTRIL) 5 mg tablet Take 1 Tab by mouth daily.  sertraline (ZOLOFT) 50 mg tablet Take 1 Tab by mouth daily. Take 1/2 tab for 1 week then take 1 tab daily. (Patient not taking: Reported on 9/16/2020)     No current facility-administered medications for this visit. Objective:     Vitals:    09/16/20 1043 09/16/20 1050 09/16/20 1156   BP: (!) 154/104 (!) 140/93 138/82   Pulse: 80     Resp: 18     SpO2: 97%     Weight: 303 lb 12.8 oz (137.8 kg)     Height: 5' 2\" (1.575 m)       Physical Examination:  General: Alert, cooperative, no distress, appears stated age. Obese  Eyes: Conjunctivae clear. Pupils equally round and reactive to light, Extraocular muscles intact. Ears: Normal external ear canals both ears. Tympanic membranes clear and mobile bilaterally   Nose: Nares normal. Septum midline. Mucosa normal. No drainage or sinus tenderness. Mouth/Throat: Lips, mucosa, and tongue normal. No oropharyngeal erythema. No tonsillar enlargement or exudate. Neck: Supple, symmetrical, trachea midline, no adenopathy. No thyroid enlargement/tenderness/nodules  Respiratory: Breathing comfortably, in no acute respiratory distress. Clear to auscultation bilaterally. Normal inspiratory and expiratory ratio. Cardiovascular: Regular rate and rhythm, S1, S2 normal, no murmur, click, rub or gallop.   -Extremities no edema. Pulses 2+ and symmetric radial and dorsalis pedis   Abdomen: Soft, non-tender, not distended. Bowel sounds normal. No masses or organomegaly.   MSK: Extremities normal appearing, atraumatic, no effusion. Gait steady and unassisted. Back symmetric, no curvature. Range of motion normal. No Costovertebral angle tenderness. Skin: Skin color, texture, turgor normal. No rashes or lesions on exposed skin. Lymph nodes: Cervical, supraclavicular nodes normal.  Neurologic: Cranial nerves II-XII intact. Strength 5/5 grossly. Sensation and reflexes normal throughout. Psychiatric: Affect blunted. Mood euthymic. Thoughts logical. Speech volume and speed normal      Office Visit on 09/16/2020   Component Date Value Ref Range Status    Hemoglobin A1c (POC) 09/16/2020 12.0  % Final         Assessment/ Plan:   Diagnoses and all orders for this visit:    1. Well woman exam (no gynecological exam)  -     CBC WITH AUTOMATED DIFF  -     METABOLIC PANEL, COMPREHENSIVE  -     LIPID PANEL    2. Uncontrolled type 2 diabetes mellitus with hyperglycemia (HCC)  -     AMB POC HEMOGLOBIN A1C  -      DIABETES FOOT EXAM  -     FSH AND LH  -     MICROALBUMIN, UR, RAND W/ MICROALB/CREAT RATIO  -     glipiZIDE (GLUCOTROL) 10 mg tablet; TAKE 1 TABLET BY MOUTH TWICE A DAY  -     metFORMIN ER (GLUCOPHAGE XR) 500 mg tablet; TAKE 2 TABLETS BY MOUTH TWICE A DAY. Office visit with labs due for refills  -     pioglitazone (ACTOS) 30 mg tablet; Take 1 Tab by mouth daily. 3. Type 2 diabetes with nephropathy ([address]    4. Hypertension associated with diabetes ([address]  -     hydroCHLOROthiazide (MICROZIDE) 12.5 mg capsule; Take 1 Cap by mouth daily. -     lisinopriL (PRINIVIL, ZESTRIL) 5 mg tablet; Take 1 Tab by mouth daily. 5. Attempting to conceive  -     FSH AND LH  -     TSH REFLEX TO T4  -     REFERRAL TO INFERTILITY    6. Obesity, morbid (HCC)  -     TSH REFLEX TO T4    7.  Encounter for immunization  -     PNEUMOCOCCAL POLYSACCHARIDE VACCINE, 23-VALENT, ADULT OR IMMUNOSUPPRESSED PT DOSE,  -     DE IMMUNIZ ADMIN,1 SINGLE/COMB VAC/TOXOID      For today's visit, I did the following:  · Reviewed PMH as listed in orders  · Refilled meds for chronic conditions, per orders  · Reviewed labs in detail or ordered lab  · Requested or reviewed prior medical records  Labs to eval end organ function and etiology of chronic/acute concerns. Relevant vaccine, cancer screening and other health maintenance reviewed and updated per orders. Diabetes uncontrolled but slightly improved from A1c 13 to 12 percent today. Add pioglitazone. Strongly advised to add insulin at this point but patient adamantly declined. -Reminded patient to complete eye examination. Blood pressure is well controlled on recheck. Continue current regimen. DASH Diet recommended. Diet and lifestyle modification encouraged for weight loss and chronic disease prevention/ management. Exercises and NSAIDs for musculoskeletal concern. Depression significantly improved. Continue therapy- encouraged counseling for mood disorder. Referral to specialist for evaluation-for evaluation of fertility. Initial labs to evaluate. Follow up with specialists per routine. We discussed the expected course, resolution and complications of the diagnosis(es) in detail. Medication risks, benefits, costs, interactions, and alternatives were discussed as indicated. I advised her to contact the office if her condition worsens, changes or fails to improve as anticipated. She expressed understanding with the diagnosis(es) and plan. Educated patient on red flag symptoms to warrant return to clinic or emergency room visit. I have discussed the diagnosis with the patient and the intended plan as seen in the above orders. The patient has been offered or received an after-visit summary and questions were answered concerning future plans. I have discussed medication side effects and warnings with the patient as well. Follow-up and Dispositions    · Return in about 4 weeks (around 10/14/2020) for Follow Up diabetes.        Signed,    Marietta Holland MD  9/16/2020

## 2020-09-16 NOTE — PATIENT INSTRUCTIONS
Weight Loss Tips:  Remember this is like a part time job so your motivation and commitment is key to your success. Mindset  Weight loss like any other behavior change starts in your mind. Think hard about what your motivates you to lose weight then meditate on that. Remind yourself of your motivation  with phone alarms, scheduled meditation time, vision board, journal- just to name a few ideas. Have realistic goals. We expect with diligent healthy diet and physical activity you can lose 5% of your body weight in 3  months. Wt in lbs x 0.05 = #lbs you should lose in 3 months. Make food and activity changes with a goal of CONSISTENCY not perfection. Food  Start eating differently. Most of your weight loss and gain is from what you eat. Use small plates only  Drink 2 liters (1/2 gallon) of water every day  HALF of every meal should be fruit or vegetables  Try meal prepping on Sunday (or your day off) with new different vegetables. Consider meal prep service such as [email or web address], wepremeals. com  Replace soda with diet soda or other zero sugar drinks (selter water just fine)  Consider using the [employer] netta for calorie counting. Goal 8630-8865 calories per day    Activity  Staying physically active will help you lose more weight and can help you get over the plateau when you weight just  won't change any more with diet. Start exercise at least 5 days per week for 40 minutes. Consider [employer] netta for home exercises. You can start with walking. I suggest walking at a speed of at least 3.5-4.5mph to for the weight loss benefit. Increase your speed or distance every 2 weeks. Do some slow stretching daily of legs, arms and back. Consider adding weight training with light weights at home or at the gym. See a doctor or a physical training for  instructions in order to avoid injuries from doing muscle training incorrectly.   Free fitness program in [place]: [email or web address]/[email or web address]         Learning About Meal Planning for Diabetes  Why plan your meals? Meal planning can be a key part of managing diabetes. Planning meals and snacks with the right balance of carbohydrate, protein, and fat can help you keep your blood sugar at the target level you set with your doctor. You don't have to eat special foods. You can eat what your family eats, including sweets once in a while. But you do have to pay attention to how often you eat and how much you eat of certain foods. You may want to work with a dietitian or a certified diabetes educator. He or she can give you tips and meal ideas and can answer your questions about meal planning. This health professional can also help you reach a healthy weight if that is one of your goals. What plan is right for you? Your dietitian or diabetes educator may suggest that you start with the plate format or carbohydrate counting. The plate format  The plate format is a simple way to help you manage how you eat. You plan meals by learning how much space each food should take on a plate. Using the plate format helps you spread carbohydrate throughout the day. It can make it easier to keep your blood sugar level within your target range. It also helps you see if you're eating healthy portion sizes. To use the plate format, you put non-starchy vegetables on half your plate. Add meat or meat substitutes on one-quarter of the plate. Put a grain or starchy vegetable (such as brown rice or a potato) on the final quarter of the plate. You can add a small piece of fruit and some low-fat or fat-free milk or yogurt, depending on your carbohydrate goal for each meal.  Here are some tips for using the plate format:  · Make sure that you are not using an oversized plate. A 9-inch plate is best. Many restaurants use larger plates. · Get used to using the plate format at home.  Then you can use it when you eat out.  · Write down your questions about using the plate format. Talk to your doctor, a dietitian, or a diabetes educator about your concerns. Carbohydrate counting  With carbohydrate counting, you plan meals based on the amount of carbohydrate in each food. Carbohydrate raises blood sugar higher and more quickly than any other nutrient. It is found in desserts, breads and cereals, and fruit. It's also found in starchy vegetables such as potatoes and corn, grains such as rice and pasta, and milk and yogurt. Spreading carbohydrate throughout the day helps keep your blood sugar levels within your target range. Your daily amount depends on several things, including your weight, how active you are, which diabetes medicines you take, and what your goals are for your blood sugar levels. A registered dietitian or diabetes educator can help you plan how much carbohydrate to include in each meal and snack. A guideline for your daily amount of carbohydrate is:  · 45 to 60 grams at each meal. That's about the same as 3 to 4 carbohydrate servings. · 15 to 20 grams at each snack. That's about the same as 1 carbohydrate serving. The Nutrition Facts label on packaged foods tells you how much carbohydrate is in a serving of the food. First, look at the serving size on the food label. Is that the amount you eat in a serving? All of the nutrition information on a food label is based on that serving size. So if you eat more or less than that, you'll need to adjust the other numbers. Total carbohydrate is the next thing you need to look for on the label. If you count carbohydrate servings, one serving of carbohydrate is 15 grams. For foods that don't come with labels, such as fresh fruits and vegetables, you'll need a guide that lists carbohydrate in these foods. Ask your doctor, dietitian, or diabetes educator about books or other nutrition guides you can use.   If you take insulin, you need to know how many grams of carbohydrate are in a meal. This lets you know how much rapid-acting insulin to take before you eat. If you use an insulin pump, you get a constant rate of insulin during the day. So the pump must be programmed at meals to give you extra insulin to cover the rise in blood sugar after meals. When you know how much carbohydrate you will eat, you can take the right amount of insulin. Or, if you always use the same amount of insulin, you need to make sure that you eat the same amount of carbohydrate at meals. If you need more help to understand carbohydrate counting and food labels, ask your doctor, dietitian, or diabetes educator. How do you get started with meal planning? Here are some tips to get started:  · Plan your meals a week at a time. Don't forget to include snacks too. · Use cookbooks or online recipes to plan several main meals. Plan some quick meals for busy nights. You also can double some recipes that freeze well. Then you can save half for other busy nights when you don't have time to cook. · Make sure you have the ingredients you need for your recipes. If you're running low on basic items, put these items on your shopping list too. · List foods that you use to make breakfasts, lunches, and snacks. List plenty of fruits and vegetables. · Post this list on the refrigerator. Add to it as you think of more things you need. · Take the list to the store to do your weekly shopping. Follow-up care is a key part of your treatment and safety. Be sure to make and go to all appointments, and call your doctor if you are having problems. It's also a good idea to know your test results and keep a list of the medicines you take. Where can you learn more? Go to http://bravo-alex.info/  Enter W411 in the search box to learn more about \"Learning About Meal Planning for Diabetes. \"  Current as of: December 20, 2019               Content Version: 12.6  © 1853-7627 [facility], [facility].    Care instructions adapted under license by [employer] (which disclaims liability or warranty for this information). If you have questions about a medical condition or this instruction, always ask your healthcare professional. [address] any warranty or liability for your use of this information. A Healthy Lifestyle: Care Instructions  Your Care Instructions     A healthy lifestyle can help you feel good, stay at a healthy weight, and have plenty of energy for both work and play. A healthy lifestyle is something you can share with your whole family. A healthy lifestyle also can lower your risk for serious health problems, such as high blood pressure, heart disease, and diabetes. You can follow a few steps listed below to improve your health and the health of your family. Follow-up care is a key part of your treatment and safety. Be sure to make and go to all appointments, and call your doctor if you are having problems. It's also a good idea to know your test results and keep a list of the medicines you take. How can you care for yourself at home? · Do not eat too much sugar, fat, or fast foods. You can still have dessert and treats now and then. The goal is moderation. · Start small to improve your eating habits. Pay attention to portion sizes, drink less juice and soda pop, and eat more fruits and vegetables. ? Eat a healthy amount of food. A 3-ounce serving of meat, for example, is about the size of a deck of cards. Fill the rest of your plate with vegetables and whole grains. ? Limit the amount of soda and sports drinks you have every day. Drink more water when you are thirsty. ? Eat at least 5 servings of fruits and vegetables every day. It may seem like a lot, but it is not hard to reach this goal. A serving or helping is 1 piece of fruit, 1 cup of vegetables, or 2 cups of leafy, raw vegetables.  Have an apple or some carrot sticks as an afternoon snack instead of a candy bar. Try to have fruits and/or vegetables at every meal.  · Make exercise part of your daily routine. You may want to start with simple activities, such as walking, bicycling, or slow swimming. Try to be active 30 to 60 minutes every day. You do not need to do all 30 to 60 minutes all at once. For example, you can exercise 3 times a day for 10 or 20 minutes. Moderate exercise is safe for most people, but it is always a good idea to talk to your doctor before starting an exercise program.  · Keep moving. [employer] Rm the lawn, work in the garden, or [employer]. Take the stairs instead of the elevator at work. · If you smoke, quit. People who smoke have an increased risk for heart attack, stroke, cancer, and other lung illnesses. Quitting is hard, but there are ways to boost your chance of quitting tobacco for good. ? Use nicotine gum, patches, or lozenges. ? Ask your doctor about stop-smoking programs and medicines. ? Keep trying. In addition to reducing your risk of diseases in the future, you will notice some benefits soon after you stop using tobacco. If you have shortness of breath or asthma symptoms, they will likely get better within a few weeks after you quit. · Limit how much alcohol you drink. Moderate amounts of alcohol (up to 2 drinks a day for men, 1 drink a day for women) are okay. But drinking too much can lead to liver problems, high blood pressure, and other health problems. Family health  If you have a family, there are many things you can do together to improve your health. · Eat meals together as a family as often as possible. · Eat healthy foods. This includes fruits, vegetables, lean meats and dairy, and whole grains. · Include your family in your fitness plan. Most people think of activities such as jogging or tennis as the way to fitness, but there are many ways you and your family can be more active. Anything that makes you breathe hard and gets your heart pumping is exercise. Here are some tips:  ? Walk to do errands or to take your child to school or the bus.  ? Go for a family bike ride after dinner instead of watching TV. Where can you learn more? Go to http://bravo-alex.info/  Enter F038 in the search box to learn more about \"A Healthy Lifestyle: Care Instructions. \"  Current as of: January 31, 2020               Content Version: 12.6  © 2006-2020 [employer]. Care instructions adapted under license by [employer] (which disclaims liability or warranty for this information). If you have questions about a medical condition or this instruction, always ask your healthcare professional. [address] any warranty or liability for your use of this information.

## 2020-09-17 ENCOUNTER — DOCUMENTATION ONLY (OUTPATIENT)
Dept: FAMILY MEDICINE CLINIC | Age: 35
End: 2020-09-17

## 2020-09-17 LAB
ALBUMIN SERPL-MCNC: 3.9 G/DL (ref 3.8–4.8)
ALBUMIN/CREAT UR: 9 MG/G CREAT (ref 0–29)
ALBUMIN/GLOB SERPL: 1.3 {RATIO} (ref 1.2–2.2)
ALP SERPL-CCNC: 98 IU/L (ref 39–117)
ALT SERPL-CCNC: 8 IU/L (ref 0–32)
AST SERPL-CCNC: 13 IU/L (ref 0–40)
BASOPHILS # BLD AUTO: 0.1 X10E3/UL (ref 0–0.2)
BASOPHILS NFR BLD AUTO: 1 %
BILIRUB SERPL-MCNC: <0.2 MG/DL (ref 0–1.2)
BUN SERPL-MCNC: 10 MG/DL (ref 6–20)
BUN/CREAT SERPL: 18 (ref 9–23)
CALCIUM SERPL-MCNC: 10 MG/DL (ref 8.7–10.2)
CHLORIDE SERPL-SCNC: 98 MMOL/L (ref 96–106)
CHOLEST SERPL-MCNC: 245 MG/DL (ref 100–199)
CO2 SERPL-SCNC: 23 MMOL/L (ref 20–29)
CREAT SERPL-MCNC: 0.56 MG/DL (ref 0.57–1)
CREAT UR-MCNC: 131.1 MG/DL
EOSINOPHIL # BLD AUTO: 0.1 X10E3/UL (ref 0–0.4)
EOSINOPHIL NFR BLD AUTO: 1 %
ERYTHROCYTE [DISTWIDTH] IN BLOOD BY AUTOMATED COUNT: 12.7 % (ref 11.7–15.4)
FSH SERPL-ACNC: 7.1 MIU/ML
GLOBULIN SER CALC-MCNC: 2.9 G/DL (ref 1.5–4.5)
GLUCOSE SERPL-MCNC: 292 MG/DL (ref 65–99)
HCT VFR BLD AUTO: 40.2 % (ref 34–46.6)
HDLC SERPL-MCNC: 39 MG/DL
HGB BLD-MCNC: 13.2 G/DL (ref 11.1–15.9)
IMM GRANULOCYTES # BLD AUTO: 0 X10E3/UL (ref 0–0.1)
IMM GRANULOCYTES NFR BLD AUTO: 0 %
INTERPRETATION, 910389: NORMAL
LDLC SERPL CALC-MCNC: 126 MG/DL (ref 0–99)
LH SERPL-ACNC: 5.8 MIU/ML
LYMPHOCYTES # BLD AUTO: 3.5 X10E3/UL (ref 0.7–3.1)
LYMPHOCYTES NFR BLD AUTO: 44 %
MCH RBC QN AUTO: 27.6 PG (ref 26.6–33)
MCHC RBC AUTO-ENTMCNC: 32.8 G/DL (ref 31.5–35.7)
MCV RBC AUTO: 84 FL (ref 79–97)
MICROALBUMIN UR-MCNC: 12.1 UG/ML
MONOCYTES # BLD AUTO: 0.5 X10E3/UL (ref 0.1–0.9)
MONOCYTES NFR BLD AUTO: 6 %
NEUTROPHILS # BLD AUTO: 3.9 X10E3/UL (ref 1.4–7)
NEUTROPHILS NFR BLD AUTO: 48 %
PLATELET # BLD AUTO: 361 X10E3/UL (ref 150–450)
POTASSIUM SERPL-SCNC: 4.7 MMOL/L (ref 3.5–5.2)
PROT SERPL-MCNC: 6.8 G/DL (ref 6–8.5)
RBC # BLD AUTO: 4.78 X10E6/UL (ref 3.77–5.28)
SODIUM SERPL-SCNC: 136 MMOL/L (ref 134–144)
TRIGL SERPL-MCNC: 447 MG/DL (ref 0–149)
TSH SERPL DL<=0.005 MIU/L-ACNC: 1.75 UIU/ML (ref 0.45–4.5)
VLDLC SERPL CALC-MCNC: 80 MG/DL (ref 5–40)
WBC # BLD AUTO: 8.1 X10E3/UL (ref 3.4–10.8)

## 2020-09-18 RX ORDER — ATORVASTATIN CALCIUM 40 MG/1
TABLET, FILM COATED ORAL
Qty: 90 TAB | Refills: 3 | Status: SHIPPED | OUTPATIENT
Start: 2020-09-18 | End: 2021-12-28 | Stop reason: SDUPTHER

## 2020-09-18 NOTE — PROGRESS NOTES
Notify Patient:  Most of your results are normal. Your urirne protein has returned to normal. Your cholesterol remains high. Restart your cholesterol medicine called atorvastatin- we will send a refill for this.

## 2020-09-18 NOTE — PROGRESS NOTES
Call placed to patient. Name and  verified. Reviewed recent test results with patient.  She was appreciative of call

## 2020-10-14 ENCOUNTER — OFFICE VISIT (OUTPATIENT)
Dept: FAMILY MEDICINE CLINIC | Age: 35
End: 2020-10-14
Payer: MEDICAID

## 2020-10-14 VITALS
BODY MASS INDEX: 53.92 KG/M2 | HEART RATE: 89 BPM | SYSTOLIC BLOOD PRESSURE: 132 MMHG | HEIGHT: 62 IN | RESPIRATION RATE: 18 BRPM | OXYGEN SATURATION: 96 % | DIASTOLIC BLOOD PRESSURE: 86 MMHG | WEIGHT: 293 LBS

## 2020-10-14 DIAGNOSIS — E11.65 UNCONTROLLED TYPE 2 DIABETES MELLITUS WITH HYPERGLYCEMIA (HCC): Primary | ICD-10-CM

## 2020-10-14 DIAGNOSIS — F33.0 MAJOR DEPRESSIVE DISORDER, RECURRENT EPISODE, MILD (HCC): ICD-10-CM

## 2020-10-14 DIAGNOSIS — I15.2 HYPERTENSION ASSOCIATED WITH DIABETES (HCC): ICD-10-CM

## 2020-10-14 DIAGNOSIS — E11.59 HYPERTENSION ASSOCIATED WITH DIABETES (HCC): ICD-10-CM

## 2020-10-14 DIAGNOSIS — E66.01 OBESITY, MORBID (HCC): ICD-10-CM

## 2020-10-14 PROCEDURE — 99214 OFFICE O/P EST MOD 30 MIN: CPT | Performed by: FAMILY MEDICINE

## 2020-10-14 RX ORDER — PIOGLITAZONEHYDROCHLORIDE 30 MG/1
45 TABLET ORAL DAILY
Qty: 135 TAB | Refills: 1
Start: 2020-10-14 | End: 2021-01-15 | Stop reason: SDUPTHER

## 2020-10-14 NOTE — PROGRESS NOTES
Abhay Winchester [facility] Note      Subjective:     Chief Complaint   Patient presents with    Diabetes     f/u     Garret Jordan is a 30y.o. year old female who presents for virtual evaluation of the following:      DMII with Microalbuminuria:  Chronic  Home monitoring: No log in office  Key Antihyperglycemic Medications             metFORMIN ER (GLUCOPHAGE XR) 500 mg tablet (Taking) TAKE 2 TABLETS BY MOUTH TWICE DAILY. glipiZIDE (GLUCOTROL) 10 mg tablet (Taking) TAKE 1 TABLET BY MOUTH TWICE A DAY    pioglitazone (ACTOS) 30 mg tablet (Taking) Take 1 Tab by mouth daily. exenatide microspheres 2 mg serr (Taking) 2 mg by SubCUTAneous route every seven (7) days. - bydureonstarted 2 weeks ago  Preivoius Tx: Joe Pretty (stopped due to yeast infection)  Complication: Microalbuminuria   Eye: Done   Lab Results   Component Value Date/Time    Hemoglobin A1c 13.3 (H) 05/10/2019 11:38 AM    Hemoglobin A1c (POC) 12.0 09/16/2020 11:17 AM        HTN with DMII:   Chronic > 1 year  No home monitoring log in office. Key CAD CHF Meds             atorvastatin (LIPITOR) 40 mg tablet (Taking) TAKE 1 TABLET BY MOUTH EVERY DAY    hydroCHLOROthiazide (MICROZIDE) 12.5 mg capsule (Taking) Take 1 Cap by mouth daily. lisinopriL (PRINIVIL, ZESTRIL) 5 mg tablet (Taking) Take 1 Tab by mouth daily.       Denies chest pain, shortness of breath, leg swelling  BP Readings from Last 3 Encounters:   10/14/20 (!) 129/94   09/16/20 138/82   06/25/19 122/88       Obesity:   Diet: unrestricted  Previous Attempt: forskolin, zehra clense  Actiivyt: Walking 3 days per week with Fredy  Last Weight Metrics:  Weight Loss Metrics 10/14/2020 9/16/2020 6/25/2019 5/10/2019 4/18/2019 2/15/2019 1/9/2019   Today's Wt 311 lb 303 lb 12.8 oz 332 lb 12.8 oz 332 lb 331 lb 2.1 oz 342 lb 338 lb   BMI 56.88 kg/m2 55.57 kg/m2 60.87 kg/m2 60.72 kg/m2 60.56 kg/m2 64.62 kg/m2 63.86 kg/m2        Depression and Anxiety:  Mood: better  Previous Trigger: relationship issues   No active suicidal aideation  No history of admission  Managed by PCP  Key Psychotherapeutic Meds     Patient is on no psychotherapeutic meds. - did nto tke sretline due ot difficyutly filling medicaiton  ELSIE:11> 9> 9>11  3 most recent PHQ Screens 10/14/2020   Little interest or pleasure in doing things Not at all   Feeling down, depressed, irritable, or hopeless Not at all   Total Score PHQ 2 0   Trouble falling or staying asleep, or sleeping too much -   Feeling tired or having little energy -   Poor appetite, weight loss, or overeating -   Feeling bad about yourself - or that you are a failure or have let yourself or your family down -   Trouble concentrating on things such as school, work, reading, or watching TV -   Moving or speaking so slowly that other people could have noticed; or the opposite being so fidgety that others notice -   Thoughts of being better off dead, or hurting yourself in some way -   PHQ 9 Score -   How difficult have these problems made it for you to do your work, take care of your home and get along with others -         Care Team:   Dentist- None  Optho-   GYN- None        Social:   Works [employer]  Lives with mother       Review of Systems   Pertinent positives and negative per HPI. All other systems  reviewed are negative for a Comprehensive ROS (10+).        Past Medical History:   Diagnosis Date    Gallstones     History of chickenpox 6/11/2010    Hyperlipidemia 6/24/2014    Hypertension     Not on any meds    Morbid obesity ([facility] [address]     CH (nonalcoholic steatohepatitis) 1/13/2016        Social History     Socioeconomic History    Marital status: SINGLE     Spouse name: Not on file    Number of children: Not on file    Years of education: Not on file    Highest education level: Not on file   Occupational History    Not on file   Social Needs    Financial resource strain: Not on file    Food insecurity Worry: Not on file     Inability: Not on file    Transportation needs     Medical: Not on file     Non-medical: Not on file   Tobacco Use    Smoking status: Never Smoker    Smokeless tobacco: Never Used   Substance and Sexual Activity    Alcohol use: Yes     Alcohol/week: 0.0 standard drinks     Comment: occasional etoh    Drug use: No     Types: Prescription    Sexual activity: Yes   Lifestyle    Physical activity     Days per week: Not on file     Minutes per session: Not on file    Stress: Not on file   Relationships    Social connections     Talks on phone: Not on file     Gets together: Not on file     Attends Shinto service: Not on file     Active member of club or organization: Not on file     Attends meetings of clubs or organizations: Not on file     Relationship status: Not on file    Intimate partner violence     Fear of current or ex partner: Not on file     Emotionally abused: Not on file     Physically abused: Not on file     Forced sexual activity: Not on file   Other Topics Concern    Not on file   Social History Narrative    Not on file       Family History   Problem Relation Age of Onset    High Cholesterol Mother     Diabetes Father     Heart Failure Father     Hypertension Father     Hypertension Maternal Grandmother     Emphysema Maternal Grandfather     Diabetes Maternal Grandfather     Hypertension Maternal Grandfather     Cancer Maternal Grandfather         prostate cancer    Heart Disease Maternal Aunt        Current Outpatient Medications   Medication Sig    metFORMIN ER (GLUCOPHAGE XR) 500 mg tablet TAKE 2 TABLETS BY MOUTH TWICE DAILY.  atorvastatin (LIPITOR) 40 mg tablet TAKE 1 TABLET BY MOUTH EVERY DAY    glipiZIDE (GLUCOTROL) 10 mg tablet TAKE 1 TABLET BY MOUTH TWICE A DAY    hydroCHLOROthiazide (MICROZIDE) 12.5 mg capsule Take 1 Cap by mouth daily.  lisinopriL (PRINIVIL, ZESTRIL) 5 mg tablet Take 1 Tab by mouth daily.     pioglitazone (ACTOS) 30 mg tablet Take 1 Tab by mouth daily.  exenatide microspheres 2 mg serr 2 mg by SubCUTAneous route every seven (7) days.  Blood-Glucose Meter monitoring kit Fill with strips and lancets for bid testing. Uncontrolled type 2 DM E11.21    glucose blood VI test strips (blood glucose test) strip Any brand, for uncontrolled DM E11.21 check up to 3x daily     No current facility-administered medications for this visit. Objective:     Vitals:    10/14/20 1154 10/14/20 1247   BP: (!) 129/94 132/86   Pulse: 89    Resp: 18    SpO2: 96%    Weight: 311 lb (141.1 kg)    Height: 5' 2\" (1.575 m)      Physical Examination:  General: Alert, cooperative, no distress, appears stated age. Obese  Eyes: Conjunctivae clear. Pupils equally round and reactive to light, Extraocular muscles intact. Ears: Normal external ear canals both ears. Tympanic membranes clear and mobile bilaterally   Nose: Nares normal. Septum midline. Mucosa normal. No drainage or sinus tenderness. Mouth/Throat: Lips, mucosa, and tongue normal. No oropharyngeal erythema. No tonsillar enlargement or exudate. Neck: Supple, symmetrical, trachea midline, no adenopathy. No thyroid enlargement/tenderness/nodules  Respiratory: Breathing comfortably, in no acute respiratory distress. Clear to auscultation bilaterally. Normal inspiratory and expiratory ratio. Cardiovascular: Regular rate and rhythm, S1, S2 normal, no murmur, click, rub or gallop.   -Extremities no edema. Pulses 2+ and symmetric radial and dorsalis pedis   Abdomen: Soft, non-tender, not distended. Bowel sounds normal. No masses or organomegaly. MSK: Extremities normal appearing, atraumatic, no effusion. Gait steady and unassisted. Back symmetric, no curvature. Range of motion normal. No Costovertebral angle tenderness. Skin: Skin color, texture, turgor normal. No rashes or lesions on exposed skin. Lymph nodes: Cervical, supraclavicular nodes normal.  Neurologic: Cranial nerves II-XII intact. Strength 5/5 grossly. Sensation and reflexes normal throughout. Psychiatric: Affect blunted. Mood euthymic. Thoughts logical. Speech volume and speed normal      Office Visit on 09/16/2020   Component Date Value Ref Range Status    Hemoglobin A1c (POC) 09/16/2020 12.0  % Final    Luteinizing hormone 09/16/2020 5.8  mIU/mL Final    Comment:                     Adult Female: Follicular phase      2.4 -  12.6                        Ovulation phase      14.0 -  95.6                        Luteal phase          1.0 -  11.4                        Postmenopausal        7.7 -  58.5      [facility] 09/16/2020 7.1  mIU/mL Final    Comment:                     Adult Female:                         Follicular phase      3.5 -  12.5                        Ovulation phase       4.7 -  21.5                        Luteal phase          1.7 -   7.7                        Postmenopausal       25.8 - 134.8      TSH 09/16/2020 1.750  0.450 - 4.500 uIU/mL Final    Creatinine, urine 09/16/2020 131.1  Not Estab. mg/dL Final    Microalbumin, urine 09/16/2020 12.1  Not Estab. ug/mL Final    Microalb/Creat ratio (ug/mg creat.) 09/16/2020 9  0 - 29 mg/g creat Final    Comment:                        Normal:                0 -  29                         Moderately increased: 30 - 300                         Severely increased:       >300                **Please note reference interval change**      WBC 09/16/2020 8.1  3.4 - 10.8 x10E3/uL Final    RBC 09/16/2020 4.78  3.77 - 5.28 x10E6/uL Final    HGB 09/16/2020 13.2  11.1 - 15.9 g/dL Final    HCT 09/16/2020 40.2  34.0 - 46.6 % Final    MCV 09/16/2020 84  79 - 97 fL Final    MCH 09/16/2020 27.6  26.6 - 33.0 pg Final    MCHC 09/16/2020 32.8  31.5 - 35.7 g/dL Final    RDW 09/16/2020 12.7  11.7 - 15.4 % Final    PLATELET 71/41/0842 586  150 - 450 x10E3/uL Final    NEUTROPHILS 09/16/2020 48  Not Estab. % Final    Lymphocytes 09/16/2020 44  Not Estab. % Final    MONOCYTES 09/16/2020 6  Not Estab. % Final    EOSINOPHILS 09/16/2020 1  Not Estab. % Final    BASOPHILS 09/16/2020 1  Not Estab. % Final    ABS. NEUTROPHILS 09/16/2020 3.9  1.4 - 7.0 x10E3/uL Final    Abs Lymphocytes 09/16/2020 3.5* 0.7 - 3.1 x10E3/uL Final    ABS. MONOCYTES 09/16/2020 0.5  0.1 - 0.9 x10E3/uL Final    ABS. EOSINOPHILS 09/16/2020 0.1  0.0 - 0.4 x10E3/uL Final    ABS. BASOPHILS 09/16/2020 0.1  0.0 - 0.2 x10E3/uL Final    IMMATURE GRANULOCYTES 09/16/2020 0  Not Estab. % Final    ABS. IMM. GRANS. 09/16/2020 0.0  0.0 - 0.1 x10E3/uL Final    Glucose 09/16/2020 292* 65 - 99 mg/dL Final    BUN 09/16/2020 10  6 - 20 mg/dL Final    Creatinine 09/16/2020 0.56* 0.57 - 1.00 mg/dL Final    GFR est non-AA 09/16/2020 124  >59 mL/min/1.73 Final    GFR est AA 09/16/2020 143  >59 mL/min/1.73 Final    BUN/Creatinine ratio 09/16/2020 18  9 - 23 Final    Sodium 09/16/2020 136  134 - 144 mmol/L Final    Potassium 09/16/2020 4.7  3.5 - 5.2 mmol/L Final    Chloride 09/16/2020 98  96 - 106 mmol/L Final    CO2 09/16/2020 23  20 - 29 mmol/L Final    Calcium 09/16/2020 10.0  8.7 - 10.2 mg/dL Final    Protein, total 09/16/2020 6.8  6.0 - 8.5 g/dL Final    Albumin 09/16/2020 3.9  3.8 - 4.8 g/dL Final    GLOBULIN, TOTAL 09/16/2020 2.9  1.5 - 4.5 g/dL Final    A-G Ratio 09/16/2020 1.3  1.2 - 2.2 Final    Bilirubin, total 09/16/2020 <0.2  0.0 - 1.2 mg/dL Final    Alk. phosphatase 09/16/2020 98  39 - 117 IU/L Final    AST (SGOT) 09/16/2020 13  0 - 40 IU/L Final    ALT (SGPT) 09/16/2020 8  0 - 32 IU/L Final    Cholesterol, total 09/16/2020 245* 100 - 199 mg/dL Final    Triglyceride 09/16/2020 447* 0 - 149 mg/dL Final    HDL Cholesterol 09/16/2020 39* >39 mg/dL Final    VLDL Cholesterol [place] 09/16/2020 80* 5 - 40 mg/dL Final    LDL Chol Calc (NIH) 09/16/2020 126* 0 - 99 mg/dL Final    INTERPRETATION 09/16/2020 Note   Final    Supplemental report is available.          Assessment/ Plan:   Diagnoses and all orders for this visit:    1. Uncontrolled type 2 diabetes mellitus with hyperglycemia (HCC)  -     pioglitazone (ACTOS) 30 mg tablet; Take 1.5 Tabs by mouth daily. 2. Hypertension associated with diabetes ([address]    3. Obesity, morbid ([address]    4. Major depressive disorder, recurrent episode, mild (HCC)      For today's visit, I did the following:  · Reviewed PMH as listed in orders  · Refilled meds for chronic conditions, per orders  Labs to eval end organ function and etiology of chronic/acute concerns. Diabetes uncontrolled. Increase pioglitazone. Strongly advised to add insulin at this point but patient adamantly declined. -Reminded patient to complete eye examination. Blood pressure is well controlled on recheck. Continue current regimen. DASH Diet recommended. Diet and lifestyle modification encouraged for weight loss and chronic disease prevention/ management. Depression significantly improved. Continue therapy- encouraged counseling for mood disorder. Follow up with specialists per routine. We discussed the expected course, resolution and complications of the diagnosis(es) in detail. Medication risks, benefits, costs, interactions, and alternatives were discussed as indicated. I advised her to contact the office if her condition worsens, changes or fails to improve as anticipated. She expressed understanding with the diagnosis(es) and plan. Educated patient on red flag symptoms to warrant return to clinic or emergency room visit. I have discussed the diagnosis with the patient and the intended plan as seen in the above orders. The patient has been offered or received an after-visit summary and questions were answered concerning future plans. I have discussed medication side effects and warnings with the patient as well. Follow-up and Dispositions    · Return in about 4 weeks (around 11/11/2020) for Follow Up diabetes.            Signed,    Mahogany Render, MD  10/14/2020

## 2020-10-14 NOTE — PROGRESS NOTES
Identified pt with two pt identifiers(name and ). Reviewed record in preparation for visit and have obtained necessary documentation. Chief Complaint   Patient presents with    Diabetes     f/u        Health Maintenance Due   Topic    Eye Exam Retinal or Dilated        Visit Vitals  BP (!) 129/94   Pulse 89   Resp 18   Ht 5' 2\" (1.575 m)   Wt 311 lb (141.1 kg)   SpO2 96%   BMI 56.88 kg/m²         Coordination of Care Questionnaire:  :   1) Have you been to an emergency room, urgent care, or hospitalized since your last visit? If yes, where when, and reason for visit? no       2. Have seen or consulted any other health care provider since your last visit? If yes, where when, and reason for visit? NO      Patient is accompanied by self I have received verbal consent from Laura Archer to discuss any/all medical information while they are present in the room.

## 2020-10-18 NOTE — PATIENT INSTRUCTIONS
Weight Loss Tips:  Remember this is like a part time job so your motivation and commitment is key to your success. Mindset  Weight loss like any other behavior change starts in your mind. Think hard about what your motivates you to lose weight then meditate on that. Remind yourself of your motivation  with phone alarms, scheduled meditation time, vision board, journal- just to name a few ideas. Have realistic goals. We expect with diligent healthy diet and physical activity you can lose 5% of your body weight in 3  months. Wt in lbs x 0.05 = #lbs you should lose in 3 months. Make food and activity changes with a goal of CONSISTENCY not perfection. Food  Start eating differently. Most of your weight loss and gain is from what you eat. Use small plates only  Drink 2 liters (1/2 gallon) of water every day  HALF of every meal should be fruit or vegetables  Try meal prepping on Sunday (or your day off) with new different vegetables. Consider meal prep service such as Cleane[employer], wepremeals. com  Replace soda with diet soda or other zero sugar drinks (selter water just fine)  Consider using the [employer] netta for calorie counting. Goal 6099-2875 calories per day    Activity  Staying physically active will help you lose more weight and can help you get over the plateau when you weight just  won't change any more with diet. Start exercise at least 5 days per week for 40 minutes. Consider [employer] training netta for home exercises. You can start with walking. I suggest walking at a speed of at least 3.5-4.5mph to for the weight loss benefit. Increase your speed or distance every 2 weeks. Do some slow stretching daily of legs, arms and back. Consider adding weight training with light weights at home or at the gym. See a doctor or a physical training for  instructions in order to avoid injuries from doing muscle training incorrectly.   Free fitness program in [place]: [email or web address]/[email or web address]         Learning About Meal Planning for Diabetes  Why plan your meals? Meal planning can be a key part of managing diabetes. Planning meals and snacks with the right balance of carbohydrate, protein, and fat can help you keep your blood sugar at the target level you set with your doctor. You don't have to eat special foods. You can eat what your family eats, including sweets once in a while. But you do have to pay attention to how often you eat and how much you eat of certain foods. You may want to work with a dietitian or a certified diabetes educator. He or she can give you tips and meal ideas and can answer your questions about meal planning. This health professional can also help you reach a healthy weight if that is one of your goals. What plan is right for you? Your dietitian or diabetes educator may suggest that you start with the plate format or carbohydrate counting. The plate format  The plate format is a simple way to help you manage how you eat. You plan meals by learning how much space each food should take on a plate. Using the plate format helps you spread carbohydrate throughout the day. It can make it easier to keep your blood sugar level within your target range. It also helps you see if you're eating healthy portion sizes. To use the plate format, you put non-starchy vegetables on half your plate. Add meat or meat substitutes on one-quarter of the plate. Put a grain or starchy vegetable (such as brown rice or a potato) on the final quarter of the plate. You can add a small piece of fruit and some low-fat or fat-free milk or yogurt, depending on your carbohydrate goal for each meal.  Here are some tips for using the plate format:  · Make sure that you are not using an oversized plate. A 9-inch plate is best. Many restaurants use larger plates. · Get used to using the plate format at home.  Then you can use it when you eat out.  · Write down your questions about using the plate format. Talk to your doctor, a dietitian, or a diabetes educator about your concerns. Carbohydrate counting  With carbohydrate counting, you plan meals based on the amount of carbohydrate in each food. Carbohydrate raises blood sugar higher and more quickly than any other nutrient. It is found in desserts, breads and cereals, and fruit. It's also found in starchy vegetables such as potatoes and corn, grains such as rice and pasta, and milk and yogurt. Spreading carbohydrate throughout the day helps keep your blood sugar levels within your target range. Your daily amount depends on several things, including your weight, how active you are, which diabetes medicines you take, and what your goals are for your blood sugar levels. A registered dietitian or diabetes educator can help you plan how much carbohydrate to include in each meal and snack. A guideline for your daily amount of carbohydrate is:  · 45 to 60 grams at each meal. That's about the same as 3 to 4 carbohydrate servings. · 15 to 20 grams at each snack. That's about the same as 1 carbohydrate serving. The Nutrition Facts label on packaged foods tells you how much carbohydrate is in a serving of the food. First, look at the serving size on the food label. Is that the amount you eat in a serving? All of the nutrition information on a food label is based on that serving size. So if you eat more or less than that, you'll need to adjust the other numbers. Total carbohydrate is the next thing you need to look for on the label. If you count carbohydrate servings, one serving of carbohydrate is 15 grams. For foods that don't come with labels, such as fresh fruits and vegetables, you'll need a guide that lists carbohydrate in these foods. Ask your doctor, dietitian, or diabetes educator about books or other nutrition guides you can use.   If you take insulin, you need to know how many grams of carbohydrate are in a meal. This lets you know how much rapid-acting insulin to take before you eat. If you use an insulin pump, you get a constant rate of insulin during the day. So the pump must be programmed at meals to give you extra insulin to cover the rise in blood sugar after meals. When you know how much carbohydrate you will eat, you can take the right amount of insulin. Or, if you always use the same amount of insulin, you need to make sure that you eat the same amount of carbohydrate at meals. If you need more help to understand carbohydrate counting and food labels, ask your doctor, dietitian, or diabetes educator. How do you get started with meal planning? Here are some tips to get started:  · Plan your meals a week at a time. Don't forget to include snacks too. · Use cookbooks or online recipes to plan several main meals. Plan some quick meals for busy nights. You also can double some recipes that freeze well. Then you can save half for other busy nights when you don't have time to cook. · Make sure you have the ingredients you need for your recipes. If you're running low on basic items, put these items on your shopping list too. · List foods that you use to make breakfasts, lunches, and snacks. List plenty of fruits and vegetables. · Post this list on the refrigerator. Add to it as you think of more things you need. · Take the list to the store to do your weekly shopping. Follow-up care is a key part of your treatment and safety. Be sure to make and go to all appointments, and call your doctor if you are having problems. It's also a good idea to know your test results and keep a list of the medicines you take. Where can you learn more? Go to http://www.gray.com/  Enter Z114 in the search box to learn more about \"Learning About Meal Planning for Diabetes. \"  Current as of: December 20, 2019               Content Version: 12.6  © 1414-4986 [employer].    Care instructions adapted under license by [employer] (which disclaims liability or warranty for this information). If you have questions about a medical condition or this instruction, always ask your healthcare professional. [address] any warranty or liability for your use of this information. Stretching: Exercises  Introduction  Here are some examples of exercises for stretching. Start each exercise slowly. Ease off the exercise if you start to have pain. Your doctor or physical therapist will tell you when you can start these exercises and which ones will work best for you. How to do the exercises  Latissimus stretch   1. Stand with your back straight and your feet shoulder-width apart. You can do this stretch sitting down if you are not steady on your feet. 2. Hold your arms above your head, and hold one hand with the other. 3. Pull upward while leaning straight over toward your right side. Keep your lower body straight. You should feel the stretch along your left side. 4. Hold 15 to 30 seconds, and then switch sides. 5. Repeat 2 to 4 times for each side. Triceps stretch   1. Stand with your back straight and your feet shoulder-width apart. You can do this stretch sitting down if you are not steady on your feet. 2. Bring your left elbow straight up while bending your arm. 3. Grab your left elbow with your right hand, and pull your left elbow toward your head with light pressure. If you are more flexible, you may pull your arm slightly behind your head. You will feel the stretch along the back of your arm. 4. Hold 15 to 30 seconds, and then switch elbows. 5. Repeat 2 to 4 times for each arm. Calf stretch   1. Place your hands on a wall for balance. You can also do this with your hands on the back of a chair, a countertop, or a tree. 2. Step back with your left leg. Keep the leg straight, and press your left heel into the floor.   3. Press your hips forward, bending your right leg slightly. You will feel the stretch in your left calf. 4. Hold the stretch 15 to 30 seconds. 5. Repeat 2 to 4 times for each leg. Quadriceps stretch   1. Lie on your side with one hand supporting your head. 2. Bend your upper leg back and grab your ankle with your other hand. 3. Stretch your leg back by pulling your foot toward your buttocks. You will feel the stretch in the front of your thigh. If this causes stress on your knees, do not do this stretch. 4. Hold the stretch 15 to 30 seconds. 5. Repeat 2 to 4 times for each leg. Groin stretch   1. Sit on the floor and put the soles of your feet together. Do not slump your back. 2. Grab your ankles and gently pull your legs toward you. 3. Press your knees toward the floor. You will feel the stretch in your inner thighs. 4. Hold 15 to 30 seconds. 5. Repeat 2 to 4 times. Hamstring stretch in doorway   1. Lie on the floor near a doorway, with your buttocks close to the wall. 2. Let the leg you are not stretching extend through the doorway. 3. Put the leg you want to stretch up on the wall, and straighten your knee to feel a gentle stretch at the back of your leg. 4. Hold the stretch for at least 15 to 30 seconds. Repeat 2 to 4 times. Follow-up care is a key part of your treatment and safety. Be sure to make and go to all appointments, and call your doctor if you are having problems. It's also a good idea to know your test results and keep a list of the medicines you take. Where can you learn more? Go to http://www.gray.com/  Enter P733 in the search box to learn more about \"Stretching: Exercises. \"  Current as of: January 16, 2020               Content Version: 12.6  © 6654-1911 [employer]. Care instructions adapted under license by [employer] (which disclaims liability or warranty for this information).  If you have questions about a medical condition or this instruction, always ask your healthcare professional. [address] any warranty or liability for your use of this information. Learning About Low-Carbohydrate Foods  What foods are low in carbohydrate? The foods you eat contain nutrients, such as vitamins and minerals. Carbohydrate is a nutrient. Your body needs the right amount to stay healthy and work as it should. You can use the list below to help you make choices about which foods to eat. Some foods that are lower in carbohydrate include:  Dairy and dairy alternatives  · Cheese  · Cottage cheese  · Cream cheese  · Nut milk (unsweetened)  · Soy milk (unsweetened)  · Yogurt (Greek, plain)  Fruits  · Avocado  · [employer] and other protein foods  · Almonds  · Beef  · Chicken  · Cod  · Eggs  · Halibut  · Peanut butter and other nut butters  · Pistachios  · Pork  · Pumpkin seeds  · Tofu  · Trout  · [facility] [place]  · [place] citizen  [place])  · Walnuts  Vegetables  · Broccoli  · Carrots  · Cauliflower  · Green beans  · Mushrooms  · Peppers  · Salad greens  · Spinach  · Tomatoes  Work with your doctor to find out how much of this nutrient you need. Depending on your health, you may need more or less of it in your diet. Where can you learn more? Go to http://www.gray.com/  Enter C470 in the search box to learn more about \"Learning About Low-Carbohydrate Foods. \"  Current as of: August 22, 2019               Content Version: 12.6  © 9025-2096 [employer]. Care instructions adapted under license by [employer] (which disclaims liability or warranty for this information). If you have questions about a medical condition or this instruction, always ask your healthcare professional. [address] any warranty or liability for your use of this information.

## 2020-10-22 ENCOUNTER — TELEPHONE (OUTPATIENT)
Dept: FAMILY MEDICINE CLINIC | Age: 35
End: 2020-10-22

## 2020-11-16 ENCOUNTER — OFFICE VISIT (OUTPATIENT)
Dept: FAMILY MEDICINE CLINIC | Age: 35
End: 2020-11-16
Payer: MEDICAID

## 2020-11-16 VITALS
RESPIRATION RATE: 18 BRPM | WEIGHT: 293 LBS | SYSTOLIC BLOOD PRESSURE: 120 MMHG | OXYGEN SATURATION: 98 % | BODY MASS INDEX: 53.92 KG/M2 | HEIGHT: 62 IN | DIASTOLIC BLOOD PRESSURE: 82 MMHG | HEART RATE: 81 BPM

## 2020-11-16 DIAGNOSIS — R73.09 BLOOD SUGAR INCREASED: ICD-10-CM

## 2020-11-16 DIAGNOSIS — E11.59 HYPERTENSION ASSOCIATED WITH DIABETES (HCC): ICD-10-CM

## 2020-11-16 DIAGNOSIS — F33.0 MAJOR DEPRESSIVE DISORDER, RECURRENT EPISODE, MILD (HCC): ICD-10-CM

## 2020-11-16 DIAGNOSIS — E66.01 OBESITY, MORBID (HCC): ICD-10-CM

## 2020-11-16 DIAGNOSIS — I15.2 HYPERTENSION ASSOCIATED WITH DIABETES (HCC): ICD-10-CM

## 2020-11-16 DIAGNOSIS — E11.65 UNCONTROLLED TYPE 2 DIABETES MELLITUS WITH HYPERGLYCEMIA (HCC): Primary | ICD-10-CM

## 2020-11-16 LAB — GLUCOSE POC: 235 MG/DL

## 2020-11-16 PROCEDURE — 82962 GLUCOSE BLOOD TEST: CPT | Performed by: FAMILY MEDICINE

## 2020-11-16 PROCEDURE — 99215 OFFICE O/P EST HI 40 MIN: CPT | Performed by: FAMILY MEDICINE

## 2020-11-16 NOTE — PROGRESS NOTES
Identified pt with two pt identifiers(name and ). Reviewed record in preparation for visit and have obtained necessary documentation. Chief Complaint   Patient presents with    Diabetes     1mo. f/u        Health Maintenance Due   Topic    Eye Exam Retinal or Dilated        Visit Vitals  /82   Pulse 81   Resp 18   Ht 5' 2\" (1.575 m)   Wt 320 lb 12.8 oz (145.5 kg)   SpO2 98%   BMI 58.68 kg/m²         Coordination of Care Questionnaire:  :   1) Have you been to an emergency room, urgent care, or hospitalized since your last visit? If yes, where when, and reason for visit? no       2. Have seen or consulted any other health care provider since your last visit? If yes, where when, and reason for visit? NO      Patient is accompanied by self I have received verbal consent from Luis Ivory to discuss any/all medical information while they are present in the room.

## 2020-11-16 NOTE — PROGRESS NOTES
Jah Angel [facility] Note      Subjective:     Chief Complaint   Patient presents with    Diabetes     1mo. f/u     Agustín Pedroza is a 30y.o. year old female who presents for virtual evaluation of the following:      DMII with Microalbuminuria:  Chronic  Home monitoring: No log in office  Key Antihyperglycemic Medications             metFORMIN ER (GLUCOPHAGE XR) 500 mg tablet (Taking) TAKE 2 TABLETS BY MOUTH TWICE A DAY. pioglitazone (ACTOS) 30 mg tablet (Taking) Take 1.5 Tabs by mouth daily. glipiZIDE (GLUCOTROL) 10 mg tablet (Taking) TAKE 1 TABLET BY MOUTH TWICE A DAY    exenatide microspheres 2 mg serr (Taking) 2 mg by SubCUTAneous route every seven (7) days. - missed today's and last nights po medication doses  Preivoius Tx: Katelyn House (stopped due to yeast infection)  Complication: Microalbuminuria   Eye: Done   Lab Results   Component Value Date/Time    Hemoglobin A1c 13.3 (H) 05/10/2019 11:38 AM    Hemoglobin A1c (POC) 12.0 09/16/2020 11:17 AM        HTN with DMII:   Chronic > 1 year  No home monitoring log in office. Key CAD CHF Meds             atorvastatin (LIPITOR) 40 mg tablet (Taking) TAKE 1 TABLET BY MOUTH EVERY DAY    hydroCHLOROthiazide (MICROZIDE) 12.5 mg capsule (Taking) Take 1 Cap by mouth daily. lisinopriL (PRINIVIL, ZESTRIL) 5 mg tablet (Taking) Take 1 Tab by mouth daily.       Denies chest pain, shortness of breath, leg swelling  BP Readings from Last 3 Encounters:   11/16/20 120/82   10/14/20 132/86   09/16/20 138/82       Obesity:   Diet: unrestricted  Previous Attempt: forskolin, zehra clense  Actiivyt: Walking 3 days per week with Fredy  Last Weight Metrics:  Weight Loss Metrics 11/16/2020 10/14/2020 9/16/2020 6/25/2019 5/10/2019 4/18/2019 2/15/2019   Today's Wt 320 lb 12.8 oz 311 lb 303 lb 12.8 oz 332 lb 12.8 oz 332 lb 331 lb 2.1 oz 342 lb   BMI 58.68 kg/m2 56.88 kg/m2 55.57 kg/m2 60.87 kg/m2 60.72 kg/m2 60.56 kg/m2 64.62 kg/m2    Depression and Anxiety:  Mood: better  Previous Trigger: relationship issues   No active suicidal aideation  No history of admission  Managed by PCP  Key Psychotherapeutic Meds     Patient is on no psychotherapeutic meds. -Did not take sertraline due to difficulty filling the medication  3 most recent PHQ Screens 11/16/2020   Little interest or pleasure in doing things Not at all   Feeling down, depressed, irritable, or hopeless Not at all   Total Score PHQ 2 0   Trouble falling or staying asleep, or sleeping too much -   Feeling tired or having little energy -   Poor appetite, weight loss, or overeating -   Feeling bad about yourself - or that you are a failure or have let yourself or your family down -   Trouble concentrating on things such as school, work, reading, or watching TV -   Moving or speaking so slowly that other people could have noticed; or the opposite being so fidgety that others notice -   Thoughts of being better off dead, or hurting yourself in some way -   PHQ 9 Score -   How difficult have these problems made it for you to do your work, take care of your home and get along with others -         Care Team:   Dentist- None  Optho-   GYN- None        Social:   Works [employer]  Lives with mother       Review of Systems   Pertinent positives and negative per HPI. All other systems  reviewed are negative for a Comprehensive ROS (10+).        Past Medical History:   Diagnosis Date    Gallstones     History of chickenpox 6/11/2010    Hyperlipidemia 6/24/2014    Hypertension     Not on any meds    Morbid obesity ([address]     CH (nonalcoholic steatohepatitis) 1/13/2016        Social History     Socioeconomic History    Marital status: SINGLE     Spouse name: Not on file    Number of children: Not on file    Years of education: Not on file    Highest education level: Not on file   Occupational History    Not on file   Social Needs    Financial resource strain: Not on file    Food insecurity     Worry: Not on file     Inability: Not on file    Transportation needs     Medical: Not on file     Non-medical: Not on file   Tobacco Use    Smoking status: Never Smoker    Smokeless tobacco: Never Used   Substance and Sexual Activity    Alcohol use: Yes     Alcohol/week: 0.0 standard drinks     Comment: occasional etoh    Drug use: No     Types: Prescription    Sexual activity: Yes   Lifestyle    Physical activity     Days per week: Not on file     Minutes per session: Not on file    Stress: Not on file   Relationships    Social connections     Talks on phone: Not on file     Gets together: Not on file     Attends Restorationism service: Not on file     Active member of club or organization: Not on file     Attends meetings of clubs or organizations: Not on file     Relationship status: Not on file    Intimate partner violence     Fear of current or ex partner: Not on file     Emotionally abused: Not on file     Physically abused: Not on file     Forced sexual activity: Not on file   Other Topics Concern    Not on file   Social History Narrative    Not on file       Family History   Problem Relation Age of Onset    High Cholesterol Mother     Diabetes Father     Heart Failure Father     Hypertension Father     Hypertension Maternal Grandmother     Emphysema Maternal Grandfather     Diabetes Maternal Grandfather     Hypertension Maternal Grandfather     Cancer Maternal Grandfather         prostate cancer    Heart Disease Maternal Aunt        Current Outpatient Medications   Medication Sig    metFORMIN ER (GLUCOPHAGE XR) 500 mg tablet TAKE 2 TABLETS BY MOUTH TWICE A DAY.  pioglitazone (ACTOS) 30 mg tablet Take 1.5 Tabs by mouth daily.  atorvastatin (LIPITOR) 40 mg tablet TAKE 1 TABLET BY MOUTH EVERY DAY    glipiZIDE (GLUCOTROL) 10 mg tablet TAKE 1 TABLET BY MOUTH TWICE A DAY    hydroCHLOROthiazide (MICROZIDE) 12.5 mg capsule Take 1 Cap by mouth daily.     lisinopriL (PRINIVIL, ZESTRIL) 5 mg tablet Take 1 Tab by mouth daily.  exenatide microspheres 2 mg serr 2 mg by SubCUTAneous route every seven (7) days.  Blood-Glucose Meter monitoring kit Fill with strips and lancets for bid testing. Uncontrolled type 2 DM E11.21    glucose blood VI test strips (blood glucose test) strip Any brand, for uncontrolled DM E11.21 check up to 3x daily     No current facility-administered medications for this visit. Objective:     Vitals:    11/16/20 1111   BP: 120/82   Pulse: 81   Resp: 18   SpO2: 98%   Weight: 320 lb 12.8 oz (145.5 kg)   Height: 5' 2\" (1.575 m)     Physical Examination:  General: Alert, cooperative, no distress, appears stated age. Obese  Eyes: Conjunctivae clear. Pupils equally round and reactive to light, Extraocular muscles intact. Ears: Normal external ear canals both ears. Nose: Nares normal. Septum midline. Mucosa normal. No drainage or sinus tenderness. Mouth/Throat: Lips, mucosa, and tongue normal. No oropharyngeal erythema. No tonsillar enlargement or exudate. Neck: Supple, symmetrical, trachea midline, no adenopathy. No thyroid enlargement/tenderness/nodules  Respiratory: Breathing comfortably, in no acute respiratory distress. Clear to auscultation bilaterally. Normal inspiratory and expiratory ratio. Cardiovascular: Regular rate and rhythm, S1, S2 normal, no murmur, click, rub or gallop.   -Extremities no edema. Pulses 2+ and symmetric radial and dorsalis pedis   Abdomen: Soft, non-tender, not distended. Bowel sounds normal. No masses or organomegaly. MSK: Extremities normal appearing, atraumatic, no effusion. Gait steady and unassisted. Back symmetric, no curvature. Range of motion normal. No Costovertebral angle tenderness. Skin: Skin color, texture, turgor normal. No rashes or lesions on exposed skin. Lymph nodes: Cervical, supraclavicular nodes normal.  Neurologic: Cranial nerves II-XII intact. Strength 5/5 grossly.  Sensation and reflexes normal throughout. Psychiatric: Affect blunted. Mood euthymic. Thoughts logical. Speech volume and speed normal      Office Visit on 09/16/2020   Component Date Value Ref Range Status    Hemoglobin A1c (POC) 09/16/2020 12.0  % Final    Luteinizing hormone 09/16/2020 5.8  mIU/mL Final    Comment:                     Adult Female: Follicular phase      2.4 -  12.6                        Ovulation phase      14.0 -  95.6                        Luteal phase          1.0 -  11.4                        Postmenopausal        7.7 -  58.5      [address] 09/16/2020 7.1  mIU/mL Final    Comment:                     Adult Female:                         Follicular phase      3.5 -  12.5                        Ovulation phase       4.7 -  21.5                        Luteal phase          1.7 -   7.7                        Postmenopausal       25.8 - 134.8      TSH 09/16/2020 1.750  0.450 - 4.500 uIU/mL Final    Creatinine, urine 09/16/2020 131.1  Not Estab. mg/dL Final    Microalbumin, urine 09/16/2020 12.1  Not Estab. ug/mL Final    Microalb/Creat ratio (ug/mg creat.) 09/16/2020 9  0 - 29 mg/g creat Final    Comment:                        Normal:                0 -  29                         Moderately increased: 30 - 300                         Severely increased:       >300                **Please note reference interval change**      WBC 09/16/2020 8.1  3.4 - 10.8 x10E3/uL Final    RBC 09/16/2020 4.78  3.77 - 5.28 x10E6/uL Final    HGB 09/16/2020 13.2  11.1 - 15.9 g/dL Final    HCT 09/16/2020 40.2  34.0 - 46.6 % Final    MCV 09/16/2020 84  79 - 97 fL Final    MCH 09/16/2020 27.6  26.6 - 33.0 pg Final    MCHC 09/16/2020 32.8  31.5 - 35.7 g/dL Final    RDW 09/16/2020 12.7  11.7 - 15.4 % Final    PLATELET 27/42/6521 466  150 - 450 x10E3/uL Final    NEUTROPHILS 09/16/2020 48  Not Estab. % Final    Lymphocytes 09/16/2020 44  Not Estab. % Final    MONOCYTES 09/16/2020 6  Not Estab. % Final    EOSINOPHILS 09/16/2020 1  Not Estab. % Final    BASOPHILS 09/16/2020 1  Not Estab. % Final    ABS. NEUTROPHILS 09/16/2020 3.9  1.4 - 7.0 x10E3/uL Final    Abs Lymphocytes 09/16/2020 3.5* 0.7 - 3.1 x10E3/uL Final    ABS. MONOCYTES 09/16/2020 0.5  0.1 - 0.9 x10E3/uL Final    ABS. EOSINOPHILS 09/16/2020 0.1  0.0 - 0.4 x10E3/uL Final    ABS. BASOPHILS 09/16/2020 0.1  0.0 - 0.2 x10E3/uL Final    IMMATURE GRANULOCYTES 09/16/2020 0  Not Estab. % Final    ABS. IMM. GRANS. 09/16/2020 0.0  0.0 - 0.1 x10E3/uL Final    Glucose 09/16/2020 292* 65 - 99 mg/dL Final    BUN 09/16/2020 10  6 - 20 mg/dL Final    Creatinine 09/16/2020 0.56* 0.57 - 1.00 mg/dL Final    GFR est non-AA 09/16/2020 124  >59 mL/min/1.73 Final    GFR est AA 09/16/2020 143  >59 mL/min/1.73 Final    BUN/Creatinine ratio 09/16/2020 18  9 - 23 Final    Sodium 09/16/2020 136  134 - 144 mmol/L Final    Potassium 09/16/2020 4.7  3.5 - 5.2 mmol/L Final    Chloride 09/16/2020 98  96 - 106 mmol/L Final    CO2 09/16/2020 23  20 - 29 mmol/L Final    Calcium 09/16/2020 10.0  8.7 - 10.2 mg/dL Final    Protein, total 09/16/2020 6.8  6.0 - 8.5 g/dL Final    Albumin 09/16/2020 3.9  3.8 - 4.8 g/dL Final    GLOBULIN, TOTAL 09/16/2020 2.9  1.5 - 4.5 g/dL Final    A-G Ratio 09/16/2020 1.3  1.2 - 2.2 Final    Bilirubin, total 09/16/2020 <0.2  0.0 - 1.2 mg/dL Final    Alk. phosphatase 09/16/2020 98  39 - 117 IU/L Final    AST (SGOT) 09/16/2020 13  0 - 40 IU/L Final    ALT (SGPT) 09/16/2020 8  0 - 32 IU/L Final    Cholesterol, total 09/16/2020 245* 100 - 199 mg/dL Final    Triglyceride 09/16/2020 447* 0 - 149 mg/dL Final    HDL Cholesterol 09/16/2020 39* >39 mg/dL Final    VLDL Cholesterol [place] 09/16/2020 80* 5 - 40 mg/dL Final    LDL Chol Calc (NIH) 09/16/2020 126* 0 - 99 mg/dL Final    INTERPRETATION 09/16/2020 Note   Final    Supplemental report is available.          Assessment/ Plan:   Diagnoses and all orders for this visit: 1. Uncontrolled type 2 diabetes mellitus with hyperglycemia ([address]    2. Blood sugar increased  -     AMB POC GLUCOSE BLOOD, BY GLUCOSE MONITORING DEVICE    3. Hypertension associated with diabetes ([address]    4. Obesity, morbid ([address]    5. Major depressive disorder, recurrent episode, mild (HCC)      For today's visit, I did the following:  · Reviewed PMH as listed in orders  · Refilled meds for chronic conditions, per orders  Labs to eval end organ function and etiology of chronic/acute concerns. Diabetes uncontrolled- missed couple doses recently. Continue current regimen. Encourage medication compliance even before doctor's visits. Strongly advised to add insulin at this point but patient adamantly declined. -Reminded patient to complete eye examination. Encouraged to schedule appointment while in our visit, which she declined. Blood pressure is well controlled on recheck. Continue current regimen. DASH Diet recommended. Diet and lifestyle modification encouraged for weight loss and chronic disease prevention/ management. Depression significantly improved. Continue therapy- encouraged counseling for mood disorder. Follow up with specialists per routine. Total face to face encounter time 40  minutes; >50% of time spent counseling. coordinating care regarding management of uncontrolled diabetes, obesity management. .        We discussed the expected course, resolution and complications of the diagnosis(es) in detail. Medication risks, benefits, costs, interactions, and alternatives were discussed as indicated. I advised her to contact the office if her condition worsens, changes or fails to improve as anticipated. She expressed understanding with the diagnosis(es) and plan. Educated patient on red flag symptoms to warrant return to clinic or emergency room visit. I have discussed the diagnosis with the patient and the intended plan as seen in the above orders.   The patient has been offered or received an after-visit summary and questions were answered concerning future plans. I have discussed medication side effects and warnings with the patient as well. Follow-up and Dispositions    · Return in about 4 weeks (around 12/14/2020) for Follow Up diabetes.          Signed,    Marta Vazquez MD  11/16/2020

## 2020-11-18 NOTE — PATIENT INSTRUCTIONS
Weight Loss Tips:  Remember this is like a part time job so your motivation and commitment is key to your success. Mindset  Weight loss like any other behavior change starts in your mind. Think hard about what your motivates you to lose weight then meditate on that. Remind yourself of your motivation  with phone alarms, scheduled meditation time, vision board, journal- just to name a few ideas. Have realistic goals. We expect with diligent healthy diet and physical activity you can lose 5% of your body weight in 3  months. Wt in lbs x 0.05 = #lbs you should lose in 3 months. Make food and activity changes with a goal of CONSISTENCY not perfection. Food  Start eating differently. Most of your weight loss and gain is from what you eat. Use small plates only  Drink 2 liters (1/2 gallon) of water every day  HALF of every meal should be fruit or vegetables  Try meal prepping on Sunday (or your day off) with new different vegetables. Consider meal prep service such as [employer], wepremeals. com  Replace soda with diet soda or other zero sugar drinks (selter water just fine)  Consider using the [employer] netta for calorie counting. Goal 3390-2856 calories per day    Activity  Staying physically active will help you lose more weight and can help you get over the plateau when you weight just  won't change any more with diet. Start exercise at least 5 days per week for 40 minutes. Consider [employer] training netta for home exercises. You can start with walking. I suggest walking at a speed of at least 3.5-4.5mph to for the weight loss benefit. Increase your speed or distance every 2 weeks. Do some slow stretching daily of legs, arms and back. Consider adding weight training with light weights at home or at the gym. See a doctor or a physical training for  instructions in order to avoid injuries from doing muscle training incorrectly.   Free fitness program in [place]: [email or web address]/program/[email or web address]         Learning About Meal Planning for Diabetes  Why plan your meals? Meal planning can be a key part of managing diabetes. Planning meals and snacks with the right balance of carbohydrate, protein, and fat can help you keep your blood sugar at the target level you set with your doctor. You don't have to eat special foods. You can eat what your family eats, including sweets once in a while. But you do have to pay attention to how often you eat and how much you eat of certain foods. You may want to work with a dietitian or a certified diabetes educator. He or she can give you tips and meal ideas and can answer your questions about meal planning. This health professional can also help you reach a healthy weight if that is one of your goals. What plan is right for you? Your dietitian or diabetes educator may suggest that you start with the plate format or carbohydrate counting. The plate format  The plate format is a simple way to help you manage how you eat. You plan meals by learning how much space each food should take on a plate. Using the plate format helps you spread carbohydrate throughout the day. It can make it easier to keep your blood sugar level within your target range. It also helps you see if you're eating healthy portion sizes. To use the plate format, you put non-starchy vegetables on half your plate. Add meat or meat substitutes on one-quarter of the plate. Put a grain or starchy vegetable (such as brown rice or a potato) on the final quarter of the plate. You can add a small piece of fruit and some low-fat or fat-free milk or yogurt, depending on your carbohydrate goal for each meal.  Here are some tips for using the plate format:  · Make sure that you are not using an oversized plate. A 9-inch plate is best. Many restaurants use larger plates. · Get used to using the plate format at home.  Then you can use it when you eat out.  · Write down your questions about using the plate format. Talk to your doctor, a dietitian, or a diabetes educator about your concerns. Carbohydrate counting  With carbohydrate counting, you plan meals based on the amount of carbohydrate in each food. Carbohydrate raises blood sugar higher and more quickly than any other nutrient. It is found in desserts, breads and cereals, and fruit. It's also found in starchy vegetables such as potatoes and corn, grains such as rice and pasta, and milk and yogurt. Spreading carbohydrate throughout the day helps keep your blood sugar levels within your target range. Your daily amount depends on several things, including your weight, how active you are, which diabetes medicines you take, and what your goals are for your blood sugar levels. A registered dietitian or diabetes educator can help you plan how much carbohydrate to include in each meal and snack. A guideline for your daily amount of carbohydrate is:  · 45 to 60 grams at each meal. That's about the same as 3 to 4 carbohydrate servings. · 15 to 20 grams at each snack. That's about the same as 1 carbohydrate serving. The Nutrition Facts label on packaged foods tells you how much carbohydrate is in a serving of the food. First, look at the serving size on the food label. Is that the amount you eat in a serving? All of the nutrition information on a food label is based on that serving size. So if you eat more or less than that, you'll need to adjust the other numbers. Total carbohydrate is the next thing you need to look for on the label. If you count carbohydrate servings, one serving of carbohydrate is 15 grams. For foods that don't come with labels, such as fresh fruits and vegetables, you'll need a guide that lists carbohydrate in these foods. Ask your doctor, dietitian, or diabetes educator about books or other nutrition guides you can use.   If you take insulin, you need to know how many grams of carbohydrate are in a meal. This lets you know how much rapid-acting insulin to take before you eat. If you use an insulin pump, you get a constant rate of insulin during the day. So the pump must be programmed at meals to give you extra insulin to cover the rise in blood sugar after meals. When you know how much carbohydrate you will eat, you can take the right amount of insulin. Or, if you always use the same amount of insulin, you need to make sure that you eat the same amount of carbohydrate at meals. If you need more help to understand carbohydrate counting and food labels, ask your doctor, dietitian, or diabetes educator. How do you get started with meal planning? Here are some tips to get started:  · Plan your meals a week at a time. Don't forget to include snacks too. · Use cookbooks or online recipes to plan several main meals. Plan some quick meals for busy nights. You also can double some recipes that freeze well. Then you can save half for other busy nights when you don't have time to cook. · Make sure you have the ingredients you need for your recipes. If you're running low on basic items, put these items on your shopping list too. · List foods that you use to make breakfasts, lunches, and snacks. List plenty of fruits and vegetables. · Post this list on the refrigerator. Add to it as you think of more things you need. · Take the list to the store to do your weekly shopping. Follow-up care is a key part of your treatment and safety. Be sure to make and go to all appointments, and call your doctor if you are having problems. It's also a good idea to know your test results and keep a list of the medicines you take. Where can you learn more? Go to http://www.gray.com/  Enter X573 in the search box to learn more about \"Learning About Meal Planning for Diabetes. \"  Current as of: December 20, 2019               Content Version: 12.6  © 3991-3408 [employer], [facility].    Care instructions adapted under license by [employer] (which disclaims liability or warranty for this information). If you have questions about a medical condition or this instruction, always ask your healthcare professional. [address] any warranty or liability for your use of this information. Learning About Eating More Fruits and Vegetables  What are some quick tips for eating more fruits and vegetables? We're all encouraged to eat more fruits and vegetables. Yet it can seem like one more chore on the daily to-do list. But you can add color and crunch to your meals--and lots of nutrition--with these quick tips. · Brighten up your breakfast.  ? Add sliced fruit or frozen berries to your yogurt, pancakes, or cereal.  ? Blend fresh or frozen fruit, veggies, and yogurt with a little fruit juice, and you've got a tasty smoothie. ? Make your scrambled eggs a gourmet treat by adding onions, celery, and bell peppers. ? Bake up some bran muffins with grated carrots added into the mix. · Make a livelier lunch. ? Jazz up tuna or chicken salad with apple chunks, celery, or grapes--or all of them! ? Add cucumbers, avocado slices, tomatoes, and lettuce to your sandwiches. ? Kick up the flavor of grilled cheese sandwiches with spinach and tomatoes. ? Puree some potatoes or squash to add to tomato soup. · Add delicious veggies to dinner. ? Give more color and taste to salads. Stir in red cabbage, carrots, and bell peppers. Top salads with dried cranberries or raisins. \"Frost\" your salad with orange sections or strawberries. ? Keep a bag or two of frozen vegetables ready to pull out of the freezer for a side dish. ? Spice up spaghetti and meatballs with mushrooms and bell peppers. ? Roast vegetables like cauliflower or squash in the oven with olive oil to bring out their flavor.   ? Season your veggie dish with herbs like basil and calvin and a splash of lemon juice and olive oil.  ? If you've got a main dish in the oven, stick in a potato to round out your meal.  · Grab some healthy snacks on the go. ? Scoop up an apple, banana, or plum for a quick snack. ? Cut up raw fruits and veggies to keep in your fridge. Grapes, oranges, carrots, and celery are great choices. They'll be ready for a quick snack or an after-school treat. ? Dip raw vegetables in hummus or peanut butter. ? Keep dried fruit on hand for an easy \"take with you\" snack. · Make something sweet--and healthy. ? Try baked apples or pears topped with cinnamon and honey for a delicious dessert. ? Make chocolate chip cookies even better with grated carrots added to the mix. Where can you learn more? Go to http://www.gray.com/  Enter F050 in the search box to learn more about \"Learning About Eating More Fruits and Vegetables. \"  Current as of: August 22, 2019               Content Version: 12.6  © 5288-9926 [employer]. Care instructions adapted under license by [employer] (which disclaims liability or warranty for this information). If you have questions about a medical condition or this instruction, always ask your healthcare professional. [place] [address] any warranty or liability for your use of this information. Learning About Mindfulness for Stress  What are mindfulness and stress? Stress is what you feel when you have to handle more than you are used to. A lot of things can cause stress. You may feel stress when you go on a job interview, take a test, or run a race. This kind of short-term stress is normal and even useful. It can help you if you need to work hard or react quickly. Stress also can last a long time. Long-term stress is caused by stressful situations or events. Examples of long-term stress include long-term health problems, ongoing problems at work, and conflicts in your family.  Long-term stress can harm your health. Mindfulness is a focus only on things happening in the present moment. It's a process of purposefully paying attention to and being aware of your surroundings, your emotions, your thoughts, and how your body feels. You are aware of these things, but you aren't judging these experiences as \"good\" or \"bad. \" Mindfulness can help you learn to calm your mind and body to help you cope with illness, pain, and stress. How does mindfulness help to relieve stress? Mindfulness can help quiet your mind and relax your body. Studies show that it can help some people sleep better, feel less anxious, and bring their blood pressure down. And it's been shown to help some people live and cope better with certain health problems like heart disease, depression, chronic pain, and cancer. How do you practice mindfulness? To be mindful is to pay attention, to be present, and to be accepting. · When you're mindful, you do just one thing and you pay close attention to that one thing. For example, you may sit quietly and notice your emotions or how your food tastes and smells. · When you're present, you focus on the things that are happening right now. You let go of your thoughts about the past and the future. When you dwell on the past or the future, you miss moments that can heal and strengthen you. You may miss moments like hearing a child laugh or seeing a friendly face when you think you're all alone. · When you're accepting, you don't  the present moment. Instead you accept your thoughts and feelings as they come. You can practice anytime, anywhere, and in any way you choose. You can practice in many ways. Here are a few ideas:  · While doing your chores, like washing the dishes, let your mind focus on what's in your hand. What does the dish feel like? Is the water warm or cold? · Go outside and take a few deep breaths. What is the air like? Is it warm or cold?   · When you can, take some time at the start of your day to sit alone and think. · Take a slow walk by yourself. Count your steps while you breathe in and out. · Try yoga breathing exercises, stretches, and poses to strengthen and relax your muscles. · At work, if you can, try to stop for a few moments each hour. Note how your body feels. Let yourself regroup and let your mind settle before you return to what you were doing. · If you struggle with anxiety or \"worry thoughts,\" imagine your mind as a blue kraig and your worry thoughts as clouds. Now imagine those worry thoughts floating across your mind's kraig. Just let them pass by as you watch. Follow-up care is a key part of your treatment and safety. Be sure to make and go to all appointments, and call your doctor if you are having problems. It's also a good idea to know your test results and keep a list of the medicines you take. Where can you learn more? Go to http://www.gray.com/  Enter M676 in the search box to learn more about \"Learning About Mindfulness for Stress. \"  Current as of: December 16, 2019               Content Version: 12.6  © 3736-3125 [employer]. Care instructions adapted under license by [employer] (which disclaims liability or warranty for this information). If you have questions about a medical condition or this instruction, always ask your healthcare professional. [address] any warranty or liability for your use of this information.

## 2020-11-30 ENCOUNTER — TELEPHONE (OUTPATIENT)
Dept: FAMILY MEDICINE CLINIC | Age: 35
End: 2020-11-30

## 2020-11-30 NOTE — TELEPHONE ENCOUNTER
----- Message from [place] sent at 11/24/2020 12:42 PM EST -----  Regarding: Dr. Lex Camarena Message/Vendor Calls    Caller's first and last name: Lindsey Dasilva      Reason for call: Pt was seen in the ER at [facility] on 11/23/20 for anxiety attack. Requesting a call back to see if a rx can be called in to treat anxiety. Pt uses [facility] on [id number] [address].        Callback required yes/no and why: yes      Best contact number(s):[phone]      Details to clarify the request: [facility]

## 2020-12-02 NOTE — TELEPHONE ENCOUNTER
Second attempt to contact patient. Phone is off. Will wait for patient to return call back to the office.

## 2020-12-16 ENCOUNTER — OFFICE VISIT (OUTPATIENT)
Dept: FAMILY MEDICINE CLINIC | Age: 35
End: 2020-12-16
Payer: MEDICAID

## 2020-12-16 VITALS
HEART RATE: 71 BPM | WEIGHT: 293 LBS | TEMPERATURE: 98.3 F | RESPIRATION RATE: 18 BRPM | BODY MASS INDEX: 53.92 KG/M2 | HEIGHT: 62 IN | DIASTOLIC BLOOD PRESSURE: 73 MMHG | OXYGEN SATURATION: 100 % | SYSTOLIC BLOOD PRESSURE: 124 MMHG

## 2020-12-16 DIAGNOSIS — E11.59 HYPERTENSION ASSOCIATED WITH DIABETES (HCC): ICD-10-CM

## 2020-12-16 DIAGNOSIS — F33.0 MAJOR DEPRESSIVE DISORDER, RECURRENT EPISODE, MILD (HCC): ICD-10-CM

## 2020-12-16 DIAGNOSIS — I15.2 HYPERTENSION ASSOCIATED WITH DIABETES (HCC): ICD-10-CM

## 2020-12-16 DIAGNOSIS — E66.01 OBESITY, MORBID (HCC): ICD-10-CM

## 2020-12-16 DIAGNOSIS — E11.21 TYPE 2 DIABETES WITH NEPHROPATHY (HCC): Primary | ICD-10-CM

## 2020-12-16 DIAGNOSIS — F41.8 DEPRESSION WITH ANXIETY: ICD-10-CM

## 2020-12-16 LAB
EST. AVERAGE GLUCOSE BLD GHB EST-MCNC: 240 MG/DL
HBA1C MFR BLD: 10 % (ref 4–5.6)

## 2020-12-16 PROCEDURE — 99215 OFFICE O/P EST HI 40 MIN: CPT | Performed by: FAMILY MEDICINE

## 2020-12-16 RX ORDER — SERTRALINE HYDROCHLORIDE 25 MG/1
25 TABLET, FILM COATED ORAL DAILY
Qty: 90 TAB | Refills: 1 | Status: SHIPPED | OUTPATIENT
Start: 2020-12-16 | End: 2021-02-09 | Stop reason: SDUPTHER

## 2020-12-16 NOTE — PROGRESS NOTES
Chief Complaint   Patient presents with    Diabetes     follow up     1. Have you been to the ER, urgent care clinic since your last visit? Hospitalized since your last visit? No    2. Have you seen or consulted any other health care providers outside of the [address] [place] [address] since your last visit? Include any pap smears or colon screening.  No

## 2020-12-16 NOTE — PATIENT INSTRUCTIONS
Weight Loss Tips: 
Remember this is like a part time job so your motivation and commitment is key to your success. Mindset Weight loss like any other behavior change starts in your mind. Think hard about what your motivates you to lose weight then meditate on that. Remind yourself of your motivation 
with phone alarms, scheduled meditation time, vision board, journal- just to name a few ideas. Have realistic goals. We expect with diligent healthy diet and physical activity you can lose 5% of your body weight in 3 
months. Wt in lbs x 0.05 = #lbs you should lose in 3 months. Make food and activity changes with a goal of CONSISTENCY not perfection. Food Start eating differently. Most of your weight loss and gain is from what you eat. Use small plates only Drink 2 liters (1/2 gallon) of water every day HALF of every meal should be fruit or vegetables Try meal prepping on Sunday (or your day off) with new different vegetables. Consider meal prep service such as Cleaneatz.com, wepremeals. com Replace soda with diet soda or other zero sugar drinks (selter water just fine) Consider using the [employer] netta for calorie counting. Goal 2397-3731 calories per day Activity Staying physically active will help you lose more weight and can help you get over the plateau when you weight just 
won't change any more with diet. Start exercise at least 5 days per week for 40 minutes. Consider [employer] training netta for home exercises. You can start with walking. I suggest walking at a speed of at least 3.5-4.5mph to for the weight loss benefit. Increase your speed or distance every 2 weeks. Do some slow stretching daily of legs, arms and back. Consider adding weight training with light weights at home or at the gym. See a doctor or a physical training for 
instructions in order to avoid injuries from doing muscle training incorrectly. Free fitness program in [facility]: [employer] org/program/fitness-warriors/ 
 
 
  
Learning About Eating More Fruits and Vegetables What are some quick tips for eating more fruits and vegetables? We're all encouraged to eat more fruits and vegetables. Yet it can seem like one more chore on the daily to-do list. But you can add color and crunch to your mealsand lots of nutritionwith these quick tips. · Brighten up your breakfast. 
? Add sliced fruit or frozen berries to your yogurt, pancakes, or cereal. 
? Blend fresh or frozen fruit, veggies, and yogurt with a little fruit juice, and you've got a tasty smoothie. ? Make your scrambled eggs a gourmet treat by adding onions, celery, and bell peppers. ? Bake up some bran muffins with grated carrots added into the mix. · Make a livelier lunch. ? Jazz up tuna or chicken salad with apple chunks, celery, or grapesor all of them! ? Add cucumbers, avocado slices, tomatoes, and lettuce to your sandwiches. ? Kick up the flavor of grilled cheese sandwiches with spinach and tomatoes. ? Puree some potatoes or squash to add to tomato soup. · Add delicious veggies to dinner. ? Give more color and taste to salads. Stir in red cabbage, carrots, and bell peppers. Top salads with dried cranberries or raisins. \"Frost\" your salad with orange sections or strawberries. ? Keep a bag or two of frozen vegetables ready to pull out of the freezer for a side dish. ? Spice up spaghetti and meatballs with mushrooms and bell peppers. ? Roast vegetables like cauliflower or squash in the oven with olive oil to bring out their flavor. ? Season your veggie dish with herbs like basil and calvin and a splash of lemon juice and olive oil. ? If you've got a main dish in the oven, stick in a potato to round out your meal. 
· Grab some healthy snacks on the go. ? Scoop up an apple, banana, or plum for a quick snack. ? Cut up raw fruits and veggies to keep in your fridge. Grapes, oranges, carrots, and celery are great choices. They'll be ready for a quick snack or an after-school treat. ? Dip raw vegetables in hummus or peanut butter. ? Keep dried fruit on hand for an easy \"take with you\" snack. · Make something sweetand healthy. ? Try baked apples or pears topped with cinnamon and honey for a delicious dessert. ? Make chocolate chip cookies even better with grated carrots added to the mix. Where can you learn more? Go to http://www.gray.com/ Enter F050 in the search box to learn more about \"Learning About Eating More Fruits and Vegetables. \" Current as of: August 22, 2019               Content Version: 12.6 © 0155-2470 [employer]. Care instructions adapted under license by [employer] (which disclaims liability or warranty for this information). If you have questions about a medical condition or this instruction, always ask your healthcare professional. [place] [address] any warranty or liability for your use of this information. Learning About Meal Planning for Diabetes Why plan your meals? Meal planning can be a key part of managing diabetes. Planning meals and snacks with the right balance of carbohydrate, protein, and fat can help you keep your blood sugar at the target level you set with your doctor. You don't have to eat special foods. You can eat what your family eats, including sweets once in a while. But you do have to pay attention to how often you eat and how much you eat of certain foods. You may want to work with a dietitian or a certified diabetes educator. He or she can give you tips and meal ideas and can answer your questions about meal planning. This health professional can also help you reach a healthy weight if that is one of your goals. What plan is right for you? Your dietitian or diabetes educator may suggest that you start with the plate format or carbohydrate counting. The plate format The plate format is a simple way to help you manage how you eat. You plan meals by learning how much space each food should take on a plate. Using the plate format helps you spread carbohydrate throughout the day. It can make it easier to keep your blood sugar level within your target range. It also helps you see if you're eating healthy portion sizes. To use the plate format, you put non-starchy vegetables on half your plate. Add meat or meat substitutes on one-quarter of the plate. Put a grain or starchy vegetable (such as brown rice or a potato) on the final quarter of the plate. You can add a small piece of fruit and some low-fat or fat-free milk or yogurt, depending on your carbohydrate goal for each meal. 
Here are some tips for using the plate format: · Make sure that you are not using an oversized plate. A 9-inch plate is best. Many restaurants use larger plates. · Get used to using the plate format at home. Then you can use it when you eat out. · Write down your questions about using the plate format. Talk to your doctor, a dietitian, or a diabetes educator about your concerns. Carbohydrate counting With carbohydrate counting, you plan meals based on the amount of carbohydrate in each food. Carbohydrate raises blood sugar higher and more quickly than any other nutrient. It is found in desserts, breads and cereals, and fruit. It's also found in starchy vegetables such as potatoes and corn, grains such as rice and pasta, and milk and yogurt. Spreading carbohydrate throughout the day helps keep your blood sugar levels within your target range. Your daily amount depends on several things, including your weight, how active you are, which diabetes medicines you take, and what your goals are for your blood sugar levels. A registered dietitian or diabetes educator can help you plan how much carbohydrate to include in each meal and snack. A guideline for your daily amount of carbohydrate is: · 45 to 60 grams at each meal. That's about the same as 3 to 4 carbohydrate servings. · 15 to 20 grams at each snack. That's about the same as 1 carbohydrate serving. The Nutrition Facts label on packaged foods tells you how much carbohydrate is in a serving of the food. First, look at the serving size on the food label. Is that the amount you eat in a serving? All of the nutrition information on a food label is based on that serving size. So if you eat more or less than that, you'll need to adjust the other numbers. Total carbohydrate is the next thing you need to look for on the label. If you count carbohydrate servings, one serving of carbohydrate is 15 grams. For foods that don't come with labels, such as fresh fruits and vegetables, you'll need a guide that lists carbohydrate in these foods. Ask your doctor, dietitian, or diabetes educator about books or other nutrition guides you can use. If you take insulin, you need to know how many grams of carbohydrate are in a meal. This lets you know how much rapid-acting insulin to take before you eat. If you use an insulin pump, you get a constant rate of insulin during the day. So the pump must be programmed at meals to give you extra insulin to cover the rise in blood sugar after meals. When you know how much carbohydrate you will eat, you can take the right amount of insulin. Or, if you always use the same amount of insulin, you need to make sure that you eat the same amount of carbohydrate at meals. If you need more help to understand carbohydrate counting and food labels, ask your doctor, dietitian, or diabetes educator. How do you get started with meal planning? Here are some tips to get started: 
· Plan your meals a week at a time. Don't forget to include snacks too. · Use cookbooks or online recipes to plan several main meals. Plan some quick meals for busy nights. You also can double some recipes that freeze well. Then you can save half for other busy nights when you don't have time to cook. · Make sure you have the ingredients you need for your recipes. If you're running low on basic items, put these items on your shopping list too. · List foods that you use to make breakfasts, lunches, and snacks. List plenty of fruits and vegetables. · Post this list on the refrigerator. Add to it as you think of more things you need. · Take the list to the store to do your weekly shopping. Follow-up care is a key part of your treatment and safety. Be sure to make and go to all appointments, and call your doctor if you are having problems. It's also a good idea to know your test results and keep a list of the medicines you take. Where can you learn more? Go to http://www.[employer].com/ José Hemphill in the search box to learn more about \"Learning About Meal Planning for Diabetes. \" Current as of: December 20, 2019               Content Version: 12.6 © 6458-5768 [employer]. Care instructions adapted under license by [employer] (which disclaims liability or warranty for this information). If you have questions about a medical condition or this instruction, always ask your healthcare professional. [address] any warranty or liability for your use of this information.

## 2020-12-16 NOTE — PROGRESS NOTES
Rosemary Quintanilla [facility] Note      Subjective:     Chief Complaint   Patient presents with    Diabetes     follow up     Charisse Nissen is a 30y.o. year old female who presents for virtual evaluation of the following:      DMII with Microalbuminuria:  Chronic  Home monitoring: No log in office  Key Antihyperglycemic Medications             metFORMIN ER (GLUCOPHAGE XR) 500 mg tablet (Taking) TAKE 2 TABLETS BY MOUTH TWICE A DAY. pioglitazone (ACTOS) 30 mg tablet (Taking) Take 1.5 Tabs by mouth daily. glipiZIDE (GLUCOTROL) 10 mg tablet (Taking) TAKE 1 TABLET BY MOUTH TWICE A DAY    exenatide microspheres 2 mg serr (Taking) 2 mg by SubCUTAneous route every seven (7) days. - missed today's and last nights po medication doses  Preivoius Tx: Xu Ervin (stopped due to yeast infection)  Complication: Microalbuminuria   Eye: Done   Lab Results   Component Value Date/Time    Hemoglobin A1c 13.3 (H) 05/10/2019 11:38 AM    Hemoglobin A1c (POC) 12.0 09/16/2020 11:17 AM        HTN with DMII:   Chronic > 1 year  No home monitoring log in office. Key CAD CHF Meds             atorvastatin (LIPITOR) 40 mg tablet (Taking) TAKE 1 TABLET BY MOUTH EVERY DAY    hydroCHLOROthiazide (MICROZIDE) 12.5 mg capsule (Taking) Take 1 Cap by mouth daily. lisinopriL (PRINIVIL, ZESTRIL) 5 mg tablet (Taking) Take 1 Tab by mouth daily.       Denies chest pain, shortness of breath, leg swelling  BP Readings from Last 3 Encounters:   12/16/20 124/73   11/16/20 120/82   10/14/20 132/86       Obesity:   Diet: unrestricted  Previous Attempt: forskolin, zehra clense  Actiivyt: Walking 3 days per week with Fredy  Last Weight Metrics:  Weight Loss Metrics 12/16/2020 11/16/2020 10/14/2020 9/16/2020 6/25/2019 5/10/2019 4/18/2019   Today's Wt 322 lb 6.4 oz 320 lb 12.8 oz 311 lb 303 lb 12.8 oz 332 lb 12.8 oz 332 lb 331 lb 2.1 oz   BMI 58.97 kg/m2 58.68 kg/m2 56.88 kg/m2 55.57 kg/m2 60.87 kg/m2 60.72 kg/m2 60.56 kg/m2       Depression and Anxiety:  Mood: anxious   - had anxiety attacks with indigestion  Previous Trigger: relationship issues   No active suicidal aideation  No history of admission  Managed by PCP  Key Psychotherapeutic Meds     Patient is on no psychotherapeutic meds. -Did not take sertraline due to difficulty filling the medication  3 most recent PHQ Screens 11/16/2020   Little interest or pleasure in doing things Not at all   Feeling down, depressed, irritable, or hopeless Not at all   Total Score PHQ 2 0   Trouble falling or staying asleep, or sleeping too much -   Feeling tired or having little energy -   Poor appetite, weight loss, or overeating -   Feeling bad about yourself - or that you are a failure or have let yourself or your family down -   Trouble concentrating on things such as school, work, reading, or watching TV -   Moving or speaking so slowly that other people could have noticed; or the opposite being so fidgety that others notice -   Thoughts of being better off dead, or hurting yourself in some way -   PHQ 9 Score -   How difficult have these problems made it for you to do your work, take care of your home and get along with others -     In office with boyfriend [address] Team:   Dentist- None  Optho-   GYN- None        Social:   Works [employer] personal [employer]  Lives with mother       Review of Systems   Pertinent positives and negative per HPI. All other systems  reviewed are negative for a Comprehensive ROS (10+).        Past Medical History:   Diagnosis Date    Gallstones     History of chickenpox 6/11/2010    Hyperlipidemia 6/24/2014    Hypertension     Not on any meds    Morbid obesity ([address]     CH (nonalcoholic steatohepatitis) 1/13/2016        Social History     Socioeconomic History    Marital status: SINGLE     Spouse name: Not on file    Number of children: Not on file    Years of education: Not on file    Highest education level: Not on file   Occupational History    Not on file   Social Needs    Financial resource strain: Not on file    Food insecurity     Worry: Not on file     Inability: Not on file    Transportation needs     Medical: Not on file     Non-medical: Not on file   Tobacco Use    Smoking status: Never Smoker    Smokeless tobacco: Never Used   Substance and Sexual Activity    Alcohol use: Yes     Alcohol/week: 0.0 standard drinks     Comment: occasional etoh    Drug use: No     Types: Prescription    Sexual activity: Yes   Lifestyle    Physical activity     Days per week: Not on file     Minutes per session: Not on file    Stress: Not on file   Relationships    Social connections     Talks on phone: Not on file     Gets together: Not on file     Attends Hoahaoism service: Not on file     Active member of club or organization: Not on file     Attends meetings of clubs or organizations: Not on file     Relationship status: Not on file    Intimate partner violence     Fear of current or ex partner: Not on file     Emotionally abused: Not on file     Physically abused: Not on file     Forced sexual activity: Not on file   Other Topics Concern    Not on file   Social History Narrative    Not on file       Family History   Problem Relation Age of Onset    High Cholesterol Mother     Diabetes Father     Heart Failure Father     Hypertension Father     Hypertension Maternal Grandmother     Emphysema Maternal Grandfather     Diabetes Maternal Grandfather     Hypertension Maternal Grandfather     Cancer Maternal Grandfather         prostate cancer    Heart Disease Maternal Aunt        Current Outpatient Medications   Medication Sig    metFORMIN ER (GLUCOPHAGE XR) 500 mg tablet TAKE 2 TABLETS BY MOUTH TWICE A DAY.  pioglitazone (ACTOS) 30 mg tablet Take 1.5 Tabs by mouth daily.     atorvastatin (LIPITOR) 40 mg tablet TAKE 1 TABLET BY MOUTH EVERY DAY    glipiZIDE (GLUCOTROL) 10 mg tablet TAKE 1 TABLET BY MOUTH TWICE A DAY    hydroCHLOROthiazide (MICROZIDE) 12.5 mg capsule Take 1 Cap by mouth daily.  lisinopriL (PRINIVIL, ZESTRIL) 5 mg tablet Take 1 Tab by mouth daily.  exenatide microspheres 2 mg serr 2 mg by SubCUTAneous route every seven (7) days.  Blood-Glucose Meter monitoring kit Fill with strips and lancets for bid testing. Uncontrolled type 2 DM E11.21    glucose blood VI test strips (blood glucose test) strip Any brand, for uncontrolled DM E11.21 check up to 3x daily     No current facility-administered medications for this visit. Objective:     Vitals:    12/16/20 1036   BP: 124/73   Pulse: 71   Resp: 18   Temp: 98.3 °F (36.8 °C)   TempSrc: Temporal   SpO2: 100%   Weight: 322 lb 6.4 oz (146.2 kg)   Height: 5' 2\" (1.575 m)     Physical Examination:  General: Alert, cooperative, no distress, appears stated age. Obese  Eyes: Conjunctivae clear. Pupils equally round and reactive to light, Extraocular muscles intact. Ears: Normal external ear canals both ears. Nose: Nares normal. Septum midline. Mucosa normal. No drainage or sinus tenderness. Mouth/Throat: Lips, mucosa, and tongue normal. No oropharyngeal erythema. No tonsillar enlargement or exudate. Neck: Supple, symmetrical, trachea midline, no adenopathy. No thyroid enlargement/tenderness/nodules  Respiratory: Breathing comfortably, in no acute respiratory distress. Clear to auscultation bilaterally. Normal inspiratory and expiratory ratio. Cardiovascular: Regular rate and rhythm, S1, S2 normal, no murmur, click, rub or gallop.   -Extremities no edema. Pulses 2+ and symmetric radial and dorsalis pedis   Abdomen: Soft, non-tender, not distended. Bowel sounds normal. No masses or organomegaly. MSK: Extremities normal appearing, atraumatic, no effusion. Gait steady and unassisted. Back symmetric, no curvature. Range of motion normal. No Costovertebral angle tenderness.   Skin: Skin color, texture, turgor normal. No rashes or lesions on exposed skin.  Lymph nodes: Cervical, supraclavicular nodes normal.  Neurologic: Cranial nerves II-XII intact. Strength 5/5 grossly. Sensation and reflexes normal throughout. Psychiatric: Affect blunted. Mood euthymic. Thoughts logical. Speech volume and speed normal      Office Visit on 11/16/2020   Component Date Value Ref Range Status    Glucose POC 11/16/2020 235  mg/dL Final         Assessment/ Plan:   Diagnoses and all orders for this visit:    1. Type 2 diabetes with nephropathy (HCC)  -     HEMOGLOBIN A1C WITH EAG; Future    2. Hypertension associated with diabetes ([facility]    3. Major depressive disorder, recurrent episode, mild (HCC)  -     sertraline (ZOLOFT) 25 mg tablet; Take 1 Tab by mouth daily.  -     REFERRAL TO BEHAVIORAL HEALTH    4. Depression with anxiety  -     sertraline (ZOLOFT) 25 mg tablet; Take 1 Tab by mouth daily.  -     REFERRAL TO BEHAVIORAL HEALTH    5. Obesity, morbid ([facility] [address] [facility]      For today's visit, I did the following:  · Reviewed PMH as listed in orders  · Refilled meds for chronic conditions, per orders  Labs to eval end organ function and etiology of chronic/acute concerns. Diabetes uncontrolled- missed couple doses recently. Continue current regimen.  - advised patient call insurance about bydureon coverage which is requiring PA but on formulary as preferred. Encourage medication compliance even before doctor's visits. Strongly advised to add insulin at this point but patient adamantly declined. -Reminded patient to complete eye examination. Blood pressure is well controlled on recheck. Continue current regimen. DASH Diet recommended. Diet and lifestyle modification encouraged for weight loss and chronic disease prevention/ management. Depression worsening. Start sertraline. Diana Mcadams ncouraged counseling for mood disorder. Follow up with specialists per routine. Total face to face encounter time 40  minutes; >50% of time spent counseling.  coordinating care regarding management of uncontrolled diabetes, obesity management. .        We discussed the expected course, resolution and complications of the diagnosis(es) in detail. Medication risks, benefits, costs, interactions, and alternatives were discussed as indicated. I advised her to contact the office if her condition worsens, changes or fails to improve as anticipated. She expressed understanding with the diagnosis(es) and plan. Educated patient on red flag symptoms to warrant return to clinic or emergency room visit. I have discussed the diagnosis with the patient and the intended plan as seen in the above orders. The patient has been offered or received an after-visit summary and questions were answered concerning future plans. I have discussed medication side effects and warnings with the patient as well. Follow-up and Dispositions    · Return in about 4 weeks (around 1/13/2021) for Follow Up diabetes.          Signed,    Rosemary Vázquez MD  12/16/2020

## 2020-12-24 NOTE — PROGRESS NOTES
Your dabetes is improved but remains uncontrolled. I will refer you to an endocrinologist for management. Call Dr. Berkley Alvarenga at [phone] for an appointment.  Myfacepage result comment sent

## 2020-12-29 ENCOUNTER — HOSPITAL ENCOUNTER (EMERGENCY)
Age: 35
Discharge: HOME | End: 2020-12-29
Attending: STUDENT IN AN ORGANIZED HEALTH CARE EDUCATION/TRAINING PROGRAM | Admitting: STUDENT IN AN ORGANIZED HEALTH CARE EDUCATION/TRAINING PROGRAM
Payer: MEDICAID

## 2020-12-29 ENCOUNTER — APPOINTMENT (OUTPATIENT)
Dept: GENERAL RADIOLOGY | Age: 35
End: 2020-12-29
Attending: STUDENT IN AN ORGANIZED HEALTH CARE EDUCATION/TRAINING PROGRAM
Payer: MEDICAID

## 2020-12-29 VITALS
HEART RATE: 92 BPM | TEMPERATURE: 98.2 F | SYSTOLIC BLOOD PRESSURE: 137 MMHG | OXYGEN SATURATION: 98 % | HEIGHT: 62 IN | WEIGHT: 293 LBS | BODY MASS INDEX: 53.92 KG/M2 | DIASTOLIC BLOOD PRESSURE: 94 MMHG | RESPIRATION RATE: 18 BRPM

## 2020-12-29 DIAGNOSIS — F41.1 ANXIETY STATE: ICD-10-CM

## 2020-12-29 DIAGNOSIS — R07.89 ATYPICAL CHEST PAIN: Primary | ICD-10-CM

## 2020-12-29 LAB
ALBUMIN SERPL-MCNC: 3.4 G/DL (ref 3.5–5)
ALBUMIN/GLOB SERPL: 0.7 {RATIO} (ref 1.1–2.2)
ALP SERPL-CCNC: 104 U/L (ref 45–117)
ALT SERPL-CCNC: 16 U/L (ref 12–78)
ANION GAP SERPL CALC-SCNC: 9 MMOL/L (ref 5–15)
AST SERPL-CCNC: 8 U/L (ref 15–37)
ATRIAL RATE: 91 BPM
ATRIAL RATE: 94 BPM
BASOPHILS # BLD: 0 K/UL (ref 0–0.1)
BASOPHILS NFR BLD: 0 % (ref 0–1)
BILIRUB SERPL-MCNC: 0.6 MG/DL (ref 0.2–1)
BUN SERPL-MCNC: 9 MG/DL (ref 6–20)
BUN/CREAT SERPL: 12 (ref 12–20)
CALCIUM SERPL-MCNC: 9.8 MG/DL (ref 8.5–10.1)
CALCULATED P AXIS, ECG09: 44 DEGREES
CALCULATED P AXIS, ECG09: 45 DEGREES
CALCULATED R AXIS, ECG10: 7 DEGREES
CALCULATED R AXIS, ECG10: 9 DEGREES
CALCULATED T AXIS, ECG11: 22 DEGREES
CALCULATED T AXIS, ECG11: 23 DEGREES
CHLORIDE SERPL-SCNC: 101 MMOL/L (ref 97–108)
CO2 SERPL-SCNC: 26 MMOL/L (ref 21–32)
CREAT SERPL-MCNC: 0.73 MG/DL (ref 0.55–1.02)
DIAGNOSIS, 93000: NORMAL
DIAGNOSIS, 93000: NORMAL
DIFFERENTIAL METHOD BLD: NORMAL
EOSINOPHIL # BLD: 0 K/UL (ref 0–0.4)
EOSINOPHIL NFR BLD: 1 % (ref 0–7)
ERYTHROCYTE [DISTWIDTH] IN BLOOD BY AUTOMATED COUNT: 13.4 % (ref 11.5–14.5)
GLOBULIN SER CALC-MCNC: 4.7 G/DL (ref 2–4)
GLUCOSE SERPL-MCNC: 232 MG/DL (ref 65–100)
HCT VFR BLD AUTO: 37.1 % (ref 35–47)
HGB BLD-MCNC: 12 G/DL (ref 11.5–16)
IMM GRANULOCYTES # BLD AUTO: 0 K/UL (ref 0–0.04)
IMM GRANULOCYTES NFR BLD AUTO: 0 % (ref 0–0.5)
LIPASE SERPL-CCNC: 72 U/L (ref 73–393)
LYMPHOCYTES # BLD: 1.2 K/UL (ref 0.8–3.5)
LYMPHOCYTES NFR BLD: 22 % (ref 12–49)
MCH RBC QN AUTO: 27.6 PG (ref 26–34)
MCHC RBC AUTO-ENTMCNC: 32.3 G/DL (ref 30–36.5)
MCV RBC AUTO: 85.3 FL (ref 80–99)
MONOCYTES # BLD: 0.6 K/UL (ref 0–1)
MONOCYTES NFR BLD: 11 % (ref 5–13)
NEUTS SEG # BLD: 3.7 K/UL (ref 1.8–8)
NEUTS SEG NFR BLD: 66 % (ref 32–75)
NRBC # BLD: 0 K/UL (ref 0–0.01)
NRBC BLD-RTO: 0 PER 100 WBC
P-R INTERVAL, ECG05: 134 MS
P-R INTERVAL, ECG05: 142 MS
PLATELET # BLD AUTO: 332 K/UL (ref 150–400)
PMV BLD AUTO: 10.1 FL (ref 8.9–12.9)
POTASSIUM SERPL-SCNC: 3.8 MMOL/L (ref 3.5–5.1)
PROT SERPL-MCNC: 8.1 G/DL (ref 6.4–8.2)
Q-T INTERVAL, ECG07: 368 MS
Q-T INTERVAL, ECG07: 370 MS
QRS DURATION, ECG06: 84 MS
QRS DURATION, ECG06: 90 MS
QTC CALCULATION (BEZET), ECG08: 455 MS
QTC CALCULATION (BEZET), ECG08: 460 MS
RBC # BLD AUTO: 4.35 M/UL (ref 3.8–5.2)
SODIUM SERPL-SCNC: 136 MMOL/L (ref 136–145)
TROPONIN I SERPL-MCNC: <0.05 NG/ML
VENTRICULAR RATE, ECG03: 91 BPM
VENTRICULAR RATE, ECG03: 94 BPM
WBC # BLD AUTO: 5.6 K/UL (ref 3.6–11)

## 2020-12-29 PROCEDURE — 99283 EMERGENCY DEPT VISIT LOW MDM: CPT

## 2020-12-29 PROCEDURE — 93005 ELECTROCARDIOGRAM TRACING: CPT

## 2020-12-29 PROCEDURE — 36415 COLL VENOUS BLD VENIPUNCTURE: CPT

## 2020-12-29 PROCEDURE — 71046 X-RAY EXAM CHEST 2 VIEWS: CPT

## 2020-12-29 PROCEDURE — 80053 COMPREHEN METABOLIC PANEL: CPT

## 2020-12-29 PROCEDURE — 85025 COMPLETE CBC W/AUTO DIFF WBC: CPT

## 2020-12-29 PROCEDURE — 74011000250 HC RX REV CODE- 250: Performed by: STUDENT IN AN ORGANIZED HEALTH CARE EDUCATION/TRAINING PROGRAM

## 2020-12-29 PROCEDURE — 74011250637 HC RX REV CODE- 250/637: Performed by: STUDENT IN AN ORGANIZED HEALTH CARE EDUCATION/TRAINING PROGRAM

## 2020-12-29 PROCEDURE — 84484 ASSAY OF TROPONIN QUANT: CPT

## 2020-12-29 PROCEDURE — 83690 ASSAY OF LIPASE: CPT

## 2020-12-29 RX ORDER — OMEPRAZOLE 20 MG/1
20 CAPSULE, DELAYED RELEASE ORAL DAILY
Qty: 10 CAP | Refills: 0 | Status: SHIPPED | OUTPATIENT
Start: 2020-12-29 | End: 2021-02-09 | Stop reason: ALTCHOICE

## 2020-12-29 RX ORDER — LORAZEPAM 0.5 MG/1
0.5 TABLET ORAL
Status: COMPLETED | OUTPATIENT
Start: 2020-12-29 | End: 2020-12-29

## 2020-12-29 RX ADMIN — LIDOCAINE HYDROCHLORIDE 40 ML: 20 SOLUTION ORAL; TOPICAL at 16:06

## 2020-12-29 RX ADMIN — LORAZEPAM 0.5 MG: 0.5 TABLET ORAL at 16:06

## 2020-12-29 NOTE — ED TRIAGE NOTES
Triage:  Pt the ED due continued left sided chest pain since last night. Pt also notes periods of fatigue. Pt denies any other symptoms.

## 2020-12-29 NOTE — ED PROVIDER NOTES
Patient is a 37year old female with PMH of HTN, HLD, DM, morbid obesity and anxiety who presents to ED c/o subseternal chest pain first appreciated last night. Patient reports pain extends from mid chest up into her neck and states she initially thought it was heartburn. Patient reports pain slightly improves with rest and is exacerbated by walking. Patient also c/o dizziness, sweaty palms, and anxiety. Patient reports CP rated 9/10 and denies radiation or migration. Patient denies shortness of breath, headache, visual changes, N/V/D, abdominal pain, leg swelling, and urinary changes. Patient reports she has not taken anything for the pain PTA. Social History: Admits to occasional tobacco smoking, occasional alcohol use, Admits to smoking marijuana.             Past Medical History:   Diagnosis Date    Gallstones     History of chickenpox 6/11/2010    Hyperlipidemia 6/24/2014    Hypertension     Not on any meds    Morbid obesity ([facility] [address]     CH (nonalcoholic steatohepatitis) 1/13/2016       Past Surgical History:   Procedure Laterality Date    HX CHOLECYSTECTOMY  7/2011         Family History:   Problem Relation Age of Onset    High Cholesterol Mother     Diabetes Father     Heart Failure Father     Hypertension Father     Hypertension Maternal Grandmother     Emphysema Maternal Grandfather     Diabetes Maternal Grandfather     Hypertension Maternal Grandfather     Cancer Maternal Grandfather         prostate cancer    Heart Disease Maternal Aunt        Social History     Socioeconomic History    Marital status: SINGLE     Spouse name: Not on file    Number of children: Not on file    Years of education: Not on file    Highest education level: Not on file   Occupational History    Not on file   Social Needs    Financial resource strain: Not on file    Food insecurity     Worry: Not on file     Inability: Not on file    Transportation needs     Medical: Not on file     Non-medical: Not on file   Tobacco Use    Smoking status: Never Smoker    Smokeless tobacco: Never Used   Substance and Sexual Activity    Alcohol use: Yes     Alcohol/week: 0.0 standard drinks     Comment: occasional etoh    Drug use: No     Types: Prescription    Sexual activity: Yes   Lifestyle    Physical activity     Days per week: Not on file     Minutes per session: Not on file    Stress: Not on file   Relationships    Social connections     Talks on phone: Not on file     Gets together: Not on file     Attends Nondenominational service: Not on file     Active member of club or organization: Not on file     Attends meetings of clubs or organizations: Not on file     Relationship status: Not on file    Intimate partner violence     Fear of current or ex partner: Not on file     Emotionally abused: Not on file     Physically abused: Not on file     Forced sexual activity: Not on file   Other Topics Concern    Not on file   Social History Narrative    Not on file         ALLERGIES: Pcn [penicillins]    Review of Systems   Constitutional: Negative for chills and fever. HENT: Negative for congestion and sore throat. Eyes: Negative for pain and discharge. Respiratory: Positive for shortness of breath. Negative for cough. Cardiovascular: Positive for chest pain. Negative for palpitations and leg swelling. Gastrointestinal: Negative for abdominal pain, diarrhea, nausea and vomiting. Genitourinary: Negative for dysuria and urgency. Musculoskeletal: Negative for back pain and neck pain. Skin: Negative for rash. Positive for sweaty palms    Allergic/Immunologic: Negative for immunocompromised state. Neurological: Positive for dizziness. Negative for tremors, syncope, weakness, light-headedness, numbness and headaches. Psychiatric/Behavioral: Negative for confusion. All other systems reviewed and are negative.       Vitals:    12/29/20 1511   BP: (!) 137/94   Pulse: 92   Resp: 18   Temp: 98.2 °F (36.8 °C) SpO2: 98%   Weight: 147.4 kg (325 lb)   Height: 5' 2\" (1.575 m)            Physical Exam  Vitals signs and nursing note reviewed. Constitutional:       Appearance: Normal appearance. She is well-developed. Comments: Morbidly obese female,    HENT:      Head: Normocephalic and atraumatic. Nose: Nose normal.      Mouth/Throat:      Mouth: Mucous membranes are moist.   Eyes:      General: Lids are normal.      Extraocular Movements: Extraocular movements intact. Conjunctiva/sclera: Conjunctivae normal.   Neck:      Musculoskeletal: Normal range of motion and neck supple. Cardiovascular:      Rate and Rhythm: Normal rate and regular rhythm. Pulses: Normal pulses. Heart sounds: Normal heart sounds, S1 normal and S2 normal. No murmur. No friction rub. No gallop. Pulmonary:      Effort: Pulmonary effort is normal. No accessory muscle usage. Breath sounds: Normal breath sounds. No stridor. No wheezing, rhonchi or rales. Abdominal:      General: Abdomen is flat. Bowel sounds are normal.      Palpations: Abdomen is soft. Tenderness: There is no abdominal tenderness. Musculoskeletal: Normal range of motion. Skin:     General: Skin is warm and dry. Capillary Refill: Capillary refill takes less than 2 seconds. Neurological:      General: No focal deficit present. Mental Status: She is alert and oriented to person, place, and time. Mental status is at baseline. Psychiatric:         Attention and Perception: Attention normal.         Mood and Affect: Mood and affect normal.         Speech: Speech normal.         Behavior: Behavior normal. Behavior is cooperative. Thought Content:  Thought content normal.         Cognition and Memory: Cognition normal.         Judgment: Judgment normal.          MDM  Number of Diagnoses or Management Options  Anxiety state  Atypical chest pain  Diagnosis management comments: Ddx includes r/o ACS, GERD, anxiety, etc. Will order labs and imaging to r/o ACS. Negative for any acute abnormalities. Negative troponin. EKG normal.  Chest x-ray no acute abnormality appreciated. Patient reports pain improved with lorazepam and GI cocktail. Provide patient will write patient with follow-up to cardiologist and PCP. Patient's . Discussed uncontrolled diabetes and advised patient to follow-up with primary care regarding this.        Amount and/or Complexity of Data Reviewed  Clinical lab tests: ordered and reviewed  Tests in the radiology section of CPT®: ordered and reviewed  Discuss the patient with other providers: yes (Dr. Margie Tristan, ED Attending )           Procedures

## 2020-12-29 NOTE — DISCHARGE INSTRUCTIONS
Your blood sugar was 232 today. Recommend follow-up with PCP for further evaluation of symptoms as well as diabetes management.

## 2020-12-30 ENCOUNTER — PATIENT OUTREACH (OUTPATIENT)
Dept: CASE MANAGEMENT | Age: 35
End: 2020-12-30

## 2021-01-11 DIAGNOSIS — E11.21 TYPE 2 DIABETES WITH NEPHROPATHY (HCC): Primary | ICD-10-CM

## 2021-01-12 ENCOUNTER — PATIENT OUTREACH (OUTPATIENT)
Dept: CASE MANAGEMENT | Age: 36
End: 2021-01-12

## 2021-01-15 ENCOUNTER — OFFICE VISIT (OUTPATIENT)
Dept: FAMILY MEDICINE CLINIC | Age: 36
End: 2021-01-15
Payer: MEDICAID

## 2021-01-15 VITALS
HEART RATE: 89 BPM | TEMPERATURE: 98.1 F | WEIGHT: 293 LBS | DIASTOLIC BLOOD PRESSURE: 68 MMHG | OXYGEN SATURATION: 98 % | HEIGHT: 62 IN | BODY MASS INDEX: 53.92 KG/M2 | SYSTOLIC BLOOD PRESSURE: 110 MMHG | RESPIRATION RATE: 22 BRPM

## 2021-01-15 DIAGNOSIS — E11.65 UNCONTROLLED TYPE 2 DIABETES MELLITUS WITH HYPERGLYCEMIA (HCC): Primary | ICD-10-CM

## 2021-01-15 DIAGNOSIS — E11.59 HYPERTENSION ASSOCIATED WITH DIABETES (HCC): ICD-10-CM

## 2021-01-15 DIAGNOSIS — E11.21 TYPE 2 DIABETES WITH NEPHROPATHY (HCC): ICD-10-CM

## 2021-01-15 DIAGNOSIS — E66.01 OBESITY, MORBID (HCC): ICD-10-CM

## 2021-01-15 DIAGNOSIS — F41.8 DEPRESSION WITH ANXIETY: ICD-10-CM

## 2021-01-15 DIAGNOSIS — F33.0 MAJOR DEPRESSIVE DISORDER, RECURRENT EPISODE, MILD (HCC): ICD-10-CM

## 2021-01-15 DIAGNOSIS — I15.2 HYPERTENSION ASSOCIATED WITH DIABETES (HCC): ICD-10-CM

## 2021-01-15 PROCEDURE — 99214 OFFICE O/P EST MOD 30 MIN: CPT | Performed by: FAMILY MEDICINE

## 2021-01-15 RX ORDER — PIOGLITAZONEHYDROCHLORIDE 30 MG/1
45 TABLET ORAL DAILY
Qty: 135 TAB | Refills: 1 | Status: SHIPPED | OUTPATIENT
Start: 2021-01-15 | End: 2021-02-09 | Stop reason: SDUPTHER

## 2021-01-15 RX ORDER — GLIPIZIDE 10 MG/1
TABLET ORAL
Qty: 180 TAB | Refills: 3 | Status: SHIPPED | OUTPATIENT
Start: 2021-01-15 | End: 2022-04-12 | Stop reason: SDUPTHER

## 2021-01-15 NOTE — PROGRESS NOTES
Chief Complaint   Patient presents with    Diabetes     follow up       1. Have you been to the ER, urgent care clinic since your last visit? Hospitalized since your last visit? Yes When: x last month, [facility]    2. Have you seen or consulted any other health care providers outside of the [address] [place] [address] since your last visit? Include any pap smears or colon screening.  No    3 most recent PHQ Screens 1/15/2021   Little interest or pleasure in doing things Not at all   Feeling down, depressed, irritable, or hopeless Not at all   Total Score PHQ 2 0   Trouble falling or staying asleep, or sleeping too much -   Feeling tired or having little energy -   Poor appetite, weight loss, or overeating -   Feeling bad about yourself - or that you are a failure or have let yourself or your family down -   Trouble concentrating on things such as school, work, reading, or watching TV -   Moving or speaking so slowly that other people could have noticed; or the opposite being so fidgety that others notice -   Thoughts of being better off dead, or hurting yourself in some way -   PHQ 9 Score -   How difficult have these problems made it for you to do your work, take care of your home and get along with others -

## 2021-01-15 NOTE — PROGRESS NOTES
[facility] Note      Subjective:     Chief Complaint   Patient presents with    Diabetes     follow up     Shelly Bowser is a 37y.o. year old female who presents for virtual evaluation of the following:      DMII with Microalbuminuria:  Chronic  Home monitoring: No log in office  Key Antihyperglycemic Medications             liraglutide (VICTOZA) 0.6 mg/0.1 mL (18 mg/3 mL) pnij (Taking) Inject 0.6 mg once daily for 1 week, then increase to 1.2 mg once daily    metFORMIN ER (GLUCOPHAGE XR) 500 mg tablet (Taking) TAKE 2 TABLETS BY MOUTH TWICE A DAY. pioglitazone (ACTOS) 30 mg tablet (Taking) Take 1.5 Tabs by mouth daily. glipiZIDE (GLUCOTROL) 10 mg tablet (Taking) TAKE 1 TABLET BY MOUTH TWICE A DAY      - missed today's and last nights po medication doses  Preivoius Tx: Astrid Coleman (stopped due to yeast infection)  Complication: Microalbuminuria   Rosas polyuria, polydipsia   Eye: Done   Lab Results   Component Value Date/Time    Hemoglobin A1c 10.0 (H) 12/16/2020 11:24 AM    Hemoglobin A1c (POC) 12.0 09/16/2020 11:17 AM        HTN with DMII:   Chronic > 1 year  No home monitoring log in office. Key CAD CHF Meds             atorvastatin (LIPITOR) 40 mg tablet (Taking) TAKE 1 TABLET BY MOUTH EVERY DAY    hydroCHLOROthiazide (MICROZIDE) 12.5 mg capsule (Taking) Take 1 Cap by mouth daily. lisinopriL (PRINIVIL, ZESTRIL) 5 mg tablet (Taking) Take 1 Tab by mouth daily.       Denies chest pain, shortness of breath, leg swelling  BP Readings from Last 3 Encounters:   01/15/21 110/68   12/29/20 (!) 137/94   12/16/20 124/73       Obesity:   Diet: unrestricted  Previous Attempt: forskolin, zehra clense  Actiivyt: Walking 3 days per week with Fredy  Last Weight Metrics:  Weight Loss Metrics 1/15/2021 12/29/2020 12/16/2020 11/16/2020 10/14/2020 9/16/2020 6/25/2019   Today's Wt 313 lb 3.2 oz 325 lb 322 lb 6.4 oz 320 lb 12.8 oz 311 lb 303 lb 12.8 oz 332 lb 12.8 oz   BMI 57.29 kg/m2 59.44 kg/m2 58.97 kg/m2 58.68 kg/m2 56.88 kg/m2 55.57 kg/m2 60.87 kg/m2       Depression and Anxiety:  Mood: \"better\"  Previous Trigger: relationship issues   No active suicidal aideation  No history of admission  Managed by PCP  Key Psychotherapeutic Meds             sertraline (ZOLOFT) 25 mg tablet (Taking) Take 1 Tab by mouth daily. 3 most recent PHQ Screens 1/15/2021   Little interest or pleasure in doing things Not at all   Feeling down, depressed, irritable, or hopeless Not at all   Total Score PHQ 2 0   Trouble falling or staying asleep, or sleeping too much -   Feeling tired or having little energy -   Poor appetite, weight loss, or overeating -   Feeling bad about yourself - or that you are a failure or have let yourself or your family down -   Trouble concentrating on things such as school, work, reading, or watching TV -   Moving or speaking so slowly that other people could have noticed; or the opposite being so fidgety that others notice -   Thoughts of being better off dead, or hurting yourself in some way -   PHQ 9 Score -   How difficult have these problems made it for you to do your work, take care of your home and get along with others -         Care Team:   Dentist- None  Optho- Name unknwn  GYN- None        Social:   Works [employer]  Lives with mother       Review of Systems   Pertinent positives and negative per HPI. All other systems  reviewed are negative for a Comprehensive ROS (10+).        Past Medical History:   Diagnosis Date    Gallstones     History of chickenpox 6/11/2010    Hyperlipidemia 6/24/2014    Hypertension     Not on any meds    Morbid obesity ([address]     CH (nonalcoholic steatohepatitis) 1/13/2016        Social History     Socioeconomic History    Marital status: SINGLE     Spouse name: Not on file    Number of children: Not on file    Years of education: Not on file    Highest education level: Not on file   Occupational History    Not on file Social Needs    Financial resource strain: Not on file    Food insecurity     Worry: Not on file     Inability: Not on file    Transportation needs     Medical: Not on file     Non-medical: Not on file   Tobacco Use    Smoking status: Never Smoker    Smokeless tobacco: Never Used   Substance and Sexual Activity    Alcohol use: Yes     Alcohol/week: 0.0 standard drinks     Comment: occasional etoh    Drug use: No     Types: Prescription    Sexual activity: Yes   Lifestyle    Physical activity     Days per week: Not on file     Minutes per session: Not on file    Stress: Not on file   Relationships    Social connections     Talks on phone: Not on file     Gets together: Not on file     Attends Worship service: Not on file     Active member of club or organization: Not on file     Attends meetings of clubs or organizations: Not on file     Relationship status: Not on file    Intimate partner violence     Fear of current or ex partner: Not on file     Emotionally abused: Not on file     Physically abused: Not on file     Forced sexual activity: Not on file   Other Topics Concern    Not on file   Social History Narrative    Not on file       Family History   Problem Relation Age of Onset    High Cholesterol Mother     Diabetes Father     Heart Failure Father     Hypertension Father     Hypertension Maternal Grandmother     Emphysema Maternal Grandfather     Diabetes Maternal Grandfather     Hypertension Maternal Grandfather     Cancer Maternal Grandfather         prostate cancer    Heart Disease Maternal Aunt        Current Outpatient Medications   Medication Sig    liraglutide (VICTOZA) 0.6 mg/0.1 mL (18 mg/3 mL) pnij Inject 0.6 mg once daily for 1 week, then increase to 1.2 mg once daily    omeprazole (PRILOSEC) 20 mg capsule Take 1 Cap by mouth daily.  sertraline (ZOLOFT) 25 mg tablet Take 1 Tab by mouth daily.     metFORMIN ER (GLUCOPHAGE XR) 500 mg tablet TAKE 2 TABLETS BY MOUTH TWICE A DAY.  pioglitazone (ACTOS) 30 mg tablet Take 1.5 Tabs by mouth daily.  atorvastatin (LIPITOR) 40 mg tablet TAKE 1 TABLET BY MOUTH EVERY DAY    glipiZIDE (GLUCOTROL) 10 mg tablet TAKE 1 TABLET BY MOUTH TWICE A DAY    hydroCHLOROthiazide (MICROZIDE) 12.5 mg capsule Take 1 Cap by mouth daily.  lisinopriL (PRINIVIL, ZESTRIL) 5 mg tablet Take 1 Tab by mouth daily.  Blood-Glucose Meter monitoring kit Fill with strips and lancets for bid testing. Uncontrolled type 2 DM E11.21    glucose blood VI test strips (blood glucose test) strip Any brand, for uncontrolled DM E11.21 check up to 3x daily     No current facility-administered medications for this visit. Objective:     Vitals:    01/15/21 0908   BP: 110/68   Pulse: 89   Resp: 22   Temp: 98.1 °F (36.7 °C)   TempSrc: Temporal   SpO2: 98%   Weight: 313 lb 3.2 oz (142.1 kg)   Height: 5' 2\" (1.575 m)     Physical Examination:  General: Alert, cooperative, no distress, appears stated age. Obese  Eyes: Conjunctivae clear. Pupils equally round and reactive to light, Extraocular muscles intact. Ears: Normal external ear canals both ears. Nose: Nares normal. Septum midline. Mucosa normal. No drainage or sinus tenderness. Mouth/Throat: Lips, mucosa, and tongue normal. No oropharyngeal erythema. No tonsillar enlargement or exudate. Neck: Supple, symmetrical, trachea midline, no adenopathy. No thyroid enlargement/tenderness/nodules  Respiratory: Breathing comfortably, in no acute respiratory distress. Clear to auscultation bilaterally. Normal inspiratory and expiratory ratio. Cardiovascular: Regular rate and rhythm, S1, S2 normal, no murmur, click, rub or gallop.   -Extremities no edema. Pulses 2+ and symmetric radial and dorsalis pedis   Abdomen: Soft, non-tender, not distended. Bowel sounds normal. No masses or organomegaly. MSK: Extremities normal appearing, atraumatic, no effusion. Gait steady and unassisted. Back symmetric, no curvature. Range of motion normal. No Costovertebral angle tenderness. Skin: Skin color, texture, turgor normal. No rashes or lesions on exposed skin. Lymph nodes: Cervical, supraclavicular nodes normal.  Neurologic: Cranial nerves II-XII intact. Strength 5/5 grossly. Sensation and reflexes normal throughout. Psychiatric: Affect blunted. Mood euthymic. Thoughts logical. Speech volume and speed normal      Admission on 12/29/2020, Discharged on 12/29/2020   Component Date Value Ref Range Status    Ventricular Rate 12/29/2020 94  BPM Final    Atrial Rate 12/29/2020 94  BPM Final    P-R Interval 12/29/2020 142  ms Final    QRS Duration 12/29/2020 90  ms Final    Q-T Interval 12/29/2020 368  ms Final    QTC Calculation (Bezet) 12/29/2020 460  ms Final    Calculated P Axis 12/29/2020 44  degrees Final    Calculated R Axis 12/29/2020 9  degrees Final    Calculated T Axis 12/29/2020 22  degrees Final    Diagnosis 12/29/2020    Final                    Value:Normal sinus rhythm  When compared with ECG of 07-JUL-2011 15:20,  No significant change was found  Confirmed by Megan Lira MD, [facility] ([id number]) on 12/29/2020 7:44:23 PM      Sodium 12/29/2020 136  136 - 145 mmol/L Final    Potassium 12/29/2020 3.8  3.5 - 5.1 mmol/L Final    Chloride 12/29/2020 101  97 - 108 mmol/L Final    CO2 12/29/2020 26  21 - 32 mmol/L Final    Anion gap 12/29/2020 9  5 - 15 mmol/L Final    Glucose 12/29/2020 232* 65 - 100 mg/dL Final    BUN 12/29/2020 9  6 - 20 MG/DL Final    Creatinine 12/29/2020 0.73  0.55 - 1.02 MG/DL Final    BUN/Creatinine ratio 12/29/2020 12  12 - 20   Final    GFR est AA 12/29/2020 >60  >60 ml/min/1.73m2 Final    GFR est non-AA 12/29/2020 >60  >60 ml/min/1.73m2 Final    Estimated GFR is calculated using the IDMS-traceable Modification of Diet in Renal Disease (MDRD) Study equation, reported for both  Americans (GFRAA) and non- Americans (GFRNA), and normalized to 1.73m2 body surface area.  The physician must decide which value applies to the patient.  Calcium 12/29/2020 9.8  8.5 - 10.1 MG/DL Final    Bilirubin, total 12/29/2020 0.6  0.2 - 1.0 MG/DL Final    ALT (SGPT) 12/29/2020 16  12 - 78 U/L Final    AST (SGOT) 12/29/2020 8* 15 - 37 U/L Final    Alk. phosphatase 12/29/2020 104  45 - 117 U/L Final    Protein, total 12/29/2020 8.1  6.4 - 8.2 g/dL Final    Albumin 12/29/2020 3.4* 3.5 - 5.0 g/dL Final    Globulin 12/29/2020 4.7* 2.0 - 4.0 g/dL Final    A-G Ratio 12/29/2020 0.7* 1.1 - 2.2   Final    WBC 12/29/2020 5.6  3.6 - 11.0 K/uL Final    RBC 12/29/2020 4.35  3.80 - 5.20 M/uL Final    HGB 12/29/2020 12.0  11.5 - 16.0 g/dL Final    HCT 12/29/2020 37.1  35.0 - 47.0 % Final    MCV 12/29/2020 85.3  80.0 - 99.0 FL Final    MCH 12/29/2020 27.6  26.0 - 34.0 PG Final    MCHC 12/29/2020 32.3  30.0 - 36.5 g/dL Final    RDW 12/29/2020 13.4  11.5 - 14.5 % Final    PLATELET 87/05/6007 504  150 - 400 K/uL Final    MPV 12/29/2020 10.1  8.9 - 12.9 FL Final    NRBC 12/29/2020 0.0  0  WBC Final    ABSOLUTE NRBC 12/29/2020 0.00  0.00 - 0.01 K/uL Final    NEUTROPHILS 12/29/2020 66  32 - 75 % Final    LYMPHOCYTES 12/29/2020 22  12 - 49 % Final    MONOCYTES 12/29/2020 11  5 - 13 % Final    EOSINOPHILS 12/29/2020 1  0 - 7 % Final    BASOPHILS 12/29/2020 0  0 - 1 % Final    IMMATURE GRANULOCYTES 12/29/2020 0  0.0 - 0.5 % Final    ABS. NEUTROPHILS 12/29/2020 3.7  1.8 - 8.0 K/UL Final    ABS. LYMPHOCYTES 12/29/2020 1.2  0.8 - 3.5 K/UL Final    ABS. MONOCYTES 12/29/2020 0.6  0.0 - 1.0 K/UL Final    ABS. EOSINOPHILS 12/29/2020 0.0  0.0 - 0.4 K/UL Final    ABS. BASOPHILS 12/29/2020 0.0  0.0 - 0.1 K/UL Final    ABS. IMM. GRANS.  12/29/2020 0.0  0.00 - 0.04 K/UL Final    DF 12/29/2020 AUTOMATED    Final    Troponin-I, Qt. 12/29/2020 <0.05  <0.05 ng/mL Final    Comment: The presence of detectable troponin above the reference range indicates myocardial injury which may be due to ischemia, myocarditis, trauma, etc.  Clinical correlation is necessary to establish the significance of this finding. Sequential testing is recommended to determine if the typical rise and fall of cTnI is demonstrated. Note:  Cardiac troponin I has a relatively long half life and may be present well after the CK MB has returned to baseline. The reference range is based on the 99th percentile of the referent population.  Lipase 12/29/2020 72* 73 - 393 U/L Final    Ventricular Rate 12/29/2020 91  BPM Final    Atrial Rate 12/29/2020 91  BPM Final    P-R Interval 12/29/2020 134  ms Final    QRS Duration 12/29/2020 84  ms Final    Q-T Interval 12/29/2020 370  ms Final    QTC Calculation (Bezet) 12/29/2020 455  ms Final    Calculated P Axis 12/29/2020 45  degrees Final    Calculated R Axis 12/29/2020 7  degrees Final    Calculated T Axis 12/29/2020 23  degrees Final    Diagnosis 12/29/2020    Final                    Value:Normal sinus rhythm  When compared with ECG of 29-DEC-2020 15:21,  MANUAL COMPARISON REQUIRED, DATA IS UNCONFIRMED  Confirmed by Deborah Multani MD, Derek Jansen ([id number] [facility]) on 12/29/2020 7:44:27 PM     Office Visit on 12/16/2020   Component Date Value Ref Range Status    Hemoglobin A1c 12/16/2020 10.0* 4.0 - 5.6 % Final    Comment: NEW METHOD PLEASE NOTE NEW REFERENCE RANGE  (NOTE)  HbA1C Interpretive Ranges  <5.7              Normal  5.7 - 6.4         Consider Prediabetes  >6.5              Consider Diabetes      Est. average glucose 12/16/2020 240  mg/dL Final   Office Visit on 11/16/2020   Component Date Value Ref Range Status    Glucose POC 11/16/2020 235  mg/dL Final         Assessment/ Plan:   Diagnoses and all orders for this visit:    1. Uncontrolled type 2 diabetes mellitus with hyperglycemia (HCC)  -     glipiZIDE (GLUCOTROL) 10 mg tablet; TAKE 1 TABLET BY MOUTH TWICE A DAY  -     Insulin Needles, Disposable, 30 gauge x 1/3\"; Use as directed  -     pioglitazone (ACTOS) 30 mg tablet;  Take 1.5 Tabs by mouth daily. 2. Type 2 diabetes with nephropathy ([address]    3. Hypertension associated with diabetes ([address]    4. Obesity, morbid ([address]    5. Major depressive disorder, recurrent episode, mild ([address]    6. Depression with anxiety      For today's visit, I did the following:  · Reviewed PMH as listed in orders  · Refilled meds for chronic conditions, per orders  Labs to eval end organ function and etiology of chronic/acute concerns. Diabetes uncontrolled Continue current regimen. Victoza sent to pharmacy recently. Encourage medication compliance even before doctor's visits. Strongly advised to add insulin at this point but patient adamantly declined. Blood pressure is well controlled. Continue current regimen. DASH Diet recommended. Diet and lifestyle modification encouraged for weight loss and chronic disease prevention/ management. Depression with persistent fluctuations. Continue sertraline. Encouraged counseling for mood disorder. Follow up with specialists per routine. We discussed the expected course, resolution and complications of the diagnosis(es) in detail. Medication risks, benefits, costs, interactions, and alternatives were discussed as indicated. I advised her to contact the office if her condition worsens, changes or fails to improve as anticipated. She expressed understanding with the diagnosis(es) and plan. Educated patient on red flag symptoms to warrant return to clinic or emergency room visit. I have discussed the diagnosis with the patient and the intended plan as seen in the above orders. The patient has been offered or received an after-visit summary and questions were answered concerning future plans. I have discussed medication side effects and warnings with the patient as well. Follow-up and Dispositions    · Return in about 4 weeks (around 2/12/2021).        Signed,    Jessica Valencia MD  1/15/2021

## 2021-01-18 ENCOUNTER — VIRTUAL VISIT (OUTPATIENT)
Dept: FAMILY MEDICINE CLINIC | Age: 36
End: 2021-01-18
Payer: MEDICAID

## 2021-01-18 DIAGNOSIS — F41.8 DEPRESSION WITH ANXIETY: Primary | ICD-10-CM

## 2021-01-18 DIAGNOSIS — E11.65 UNCONTROLLED TYPE 2 DIABETES MELLITUS WITH HYPERGLYCEMIA (HCC): ICD-10-CM

## 2021-01-18 PROCEDURE — 90837 PSYTX W PT 60 MINUTES: CPT | Performed by: SOCIAL WORKER

## 2021-01-18 NOTE — PROGRESS NOTES
This note will not be viewable in [employer] [employer] for the following reason(s). This is a Psychotherapy Note. ([address], [facility] Providers Only)   Virtual AT HOME follow up:  Met with Ms. Carlotta Aguirre today. She reports she is taking her Zoloft, 25 mg. She has reported several actual panic attacks while taking this medication. She is unsure she is on a high enough dosage as well as maybe needs a PRN for panic. This clinician suggested she send a note thru [facility] for her PCP to discuss. Ms. 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 She tested negative but due to some of her medical conditions, the office felt she needed to wait for the other members to test negative. She is hoping she gets paid as she is out of work because she is worried about the bills. She reports that there are 5 people residing with her and she is the primary breadwinner. She states that besides her boyfriend and herself, no one else contributes either financially or with chores, etc.  Overall Ms. Carlotta Aguirre is doing okay. She likes her job and feels more productive when at her work. She has \"just been lying around\" over the weekend and today and can not return to work until next Monday. She stated today also that she hasn't received her stimulus check for $600.00. She said that money would help a lot. She plans on calling the IRS to inquire. We did not make a follow up appt as she is not sure what her schedule will be. She plans on calling the office to schedule an appt. Once her schedule comes out next month.   Noris Estrella LCSW

## 2021-01-18 NOTE — TELEPHONE ENCOUNTER
MD Rabia Enriquez,    Pharmacy needs specific directions for frequency. Added \"daily\" to sig if appropriate. Patient on victoza once daily.   ThanksCeleste    Previous Refill Encounter(s): 1/15/21 (1 pkg + 11)    Requested Prescriptions     Pending Prescriptions Disp Refills    Insulin Needles, Disposable, 30 gauge x 1/3\" 1 Package 11     Sig: Use daily as directed

## 2021-01-18 NOTE — PATIENT INSTRUCTIONS
Weight Loss Tips:  Remember this is like a part time job so your motivation and commitment is key to your success. Mindset  Weight loss like any other behavior change starts in your mind. Think hard about what your motivates you to lose weight then meditate on that. Remind yourself of your motivation  with phone alarms, scheduled meditation time, vision board, journal- just to name a few ideas. Have realistic goals. We expect with diligent healthy diet and physical activity you can lose 5% of your body weight in 3  months. Wt in lbs x 0.05 = #lbs you should lose in 3 months. Make food and activity changes with a goal of CONSISTENCY not perfection. Food  Start eating differently. Most of your weight loss and gain is from what you eat. Use small plates only  Drink 2 liters (1/2 gallon) of water every day  HALF of every meal should be fruit or vegetables  Try meal prepping on Sunday (or your day off) with new different vegetables. Consider meal prep service such as Cleaneatz.com, wepremeals. com  Replace soda with diet soda or other zero sugar drinks (selter water just fine)  Consider using the Alma Johns netta for calorie counting. Goal 4786-9251 calories per day    Activity  Staying physically active will help you lose more weight and can help you get over the plateau when you weight just  won't change any more with diet. Start exercise at least 5 days per week for 40 minutes. Consider [employer] training netta for home exercises. You can start with walking. I suggest walking at a speed of at least 3.5-4.5mph to for the weight loss benefit. Increase your speed or distance every 2 weeks. Do some slow stretching daily of legs, arms and back. Consider adding weight training with light weights at home or at the gym. See a doctor or a physical training for  instructions in order to avoid injuries from doing muscle training incorrectly.   Free fitness program in [place]: [employer] org/program/fitness-warriors/         Learning About Eating More Fruits and Vegetables  What are some quick tips for eating more fruits and vegetables? We're all encouraged to eat more fruits and vegetables. Yet it can seem like one more chore on the daily to-do list. But you can add color and crunch to your meals--and lots of nutrition--with these quick tips. · Brighten up your breakfast.  ? Add sliced fruit or frozen berries to your yogurt, pancakes, or cereal.  ? Blend fresh or frozen fruit, veggies, and yogurt with a little fruit juice, and you've got a tasty smoothie. ? Make your scrambled eggs a gourmet treat by adding onions, celery, and bell peppers. ? Bake up some bran muffins with grated carrots added into the mix. · Make a livelier lunch. ? Jazz up tuna or chicken salad with apple chunks, celery, or grapes--or all of them! ? Add cucumbers, avocado slices, tomatoes, and lettuce to your sandwiches. ? Kick up the flavor of grilled cheese sandwiches with spinach and tomatoes. ? Puree some potatoes or squash to add to tomato soup. · Add delicious veggies to dinner. ? Give more color and taste to salads. Stir in red cabbage, carrots, and bell peppers. Top salads with dried cranberries or raisins. \"Frost\" your salad with orange sections or strawberries. ? Keep a bag or two of frozen vegetables ready to pull out of the freezer for a side dish. ? Spice up spaghetti and meatballs with mushrooms and bell peppers. ? Roast vegetables like cauliflower or squash in the oven with olive oil to bring out their flavor. ? Season your veggie dish with herbs like basil and calvin and a splash of lemon juice and olive oil. ? If you've got a main dish in the oven, stick in a potato to round out your meal.  · Grab some healthy snacks on the go. ? Scoop up an apple, banana, or plum for a quick snack. ? Cut up raw fruits and veggies to keep in your fridge.  Grapes, oranges, carrots, and celery are great choices. They'll be ready for a quick snack or an after-school treat. ? Dip raw vegetables in hummus or peanut butter. ? Keep dried fruit on hand for an easy \"take with you\" snack. · Make something sweet--and healthy. ? Try baked apples or pears topped with cinnamon and honey for a delicious dessert. ? Make chocolate chip cookies even better with grated carrots added to the mix. Where can you learn more? Go to http://www.gray.com/  Enter F050 in the search box to learn more about \"Learning About Eating More Fruits and Vegetables. \"  Current as of: August 22, 2019               Content Version: 12.6  © 0760-4940 [employer]. Care instructions adapted under license by [employer] (which disclaims liability or warranty for this information). If you have questions about a medical condition or this instruction, always ask your healthcare professional. [place] [address] any warranty or liability for your use of this information. Learning About Meal Planning for Diabetes  Why plan your meals? Meal planning can be a key part of managing diabetes. Planning meals and snacks with the right balance of carbohydrate, protein, and fat can help you keep your blood sugar at the target level you set with your doctor. You don't have to eat special foods. You can eat what your family eats, including sweets once in a while. But you do have to pay attention to how often you eat and how much you eat of certain foods. You may want to work with a dietitian or a certified diabetes educator. He or she can give you tips and meal ideas and can answer your questions about meal planning. This health professional can also help you reach a healthy weight if that is one of your goals. What plan is right for you? Your dietitian or diabetes educator may suggest that you start with the plate format or carbohydrate counting.   The plate format  The plate format is a simple way to help you manage how you eat. You plan meals by learning how much space each food should take on a plate. Using the plate format helps you spread carbohydrate throughout the day. It can make it easier to keep your blood sugar level within your target range. It also helps you see if you're eating healthy portion sizes. To use the plate format, you put non-starchy vegetables on half your plate. Add meat or meat substitutes on one-quarter of the plate. Put a grain or starchy vegetable (such as brown rice or a potato) on the final quarter of the plate. You can add a small piece of fruit and some low-fat or fat-free milk or yogurt, depending on your carbohydrate goal for each meal.  Here are some tips for using the plate format:  · Make sure that you are not using an oversized plate. A 9-inch plate is best. Many restaurants use larger plates. · Get used to using the plate format at home. Then you can use it when you eat out. · Write down your questions about using the plate format. Talk to your doctor, a dietitian, or a diabetes educator about your concerns. Carbohydrate counting  With carbohydrate counting, you plan meals based on the amount of carbohydrate in each food. Carbohydrate raises blood sugar higher and more quickly than any other nutrient. It is found in desserts, breads and cereals, and fruit. It's also found in starchy vegetables such as potatoes and corn, grains such as rice and pasta, and milk and yogurt. Spreading carbohydrate throughout the day helps keep your blood sugar levels within your target range. Your daily amount depends on several things, including your weight, how active you are, which diabetes medicines you take, and what your goals are for your blood sugar levels. A registered dietitian or diabetes educator can help you plan how much carbohydrate to include in each meal and snack.   A guideline for your daily amount of carbohydrate is:  · 45 to 60 grams at each meal. That's about the same as 3 to 4 carbohydrate servings. · 15 to 20 grams at each snack. That's about the same as 1 carbohydrate serving. The Nutrition Facts label on packaged foods tells you how much carbohydrate is in a serving of the food. First, look at the serving size on the food label. Is that the amount you eat in a serving? All of the nutrition information on a food label is based on that serving size. So if you eat more or less than that, you'll need to adjust the other numbers. Total carbohydrate is the next thing you need to look for on the label. If you count carbohydrate servings, one serving of carbohydrate is 15 grams. For foods that don't come with labels, such as fresh fruits and vegetables, you'll need a guide that lists carbohydrate in these foods. Ask your doctor, dietitian, or diabetes educator about books or other nutrition guides you can use. If you take insulin, you need to know how many grams of carbohydrate are in a meal. This lets you know how much rapid-acting insulin to take before you eat. If you use an insulin pump, you get a constant rate of insulin during the day. So the pump must be programmed at meals to give you extra insulin to cover the rise in blood sugar after meals. When you know how much carbohydrate you will eat, you can take the right amount of insulin. Or, if you always use the same amount of insulin, you need to make sure that you eat the same amount of carbohydrate at meals. If you need more help to understand carbohydrate counting and food labels, ask your doctor, dietitian, or diabetes educator. How do you get started with meal planning? Here are some tips to get started:  · Plan your meals a week at a time. Don't forget to include snacks too. · Use cookbooks or online recipes to plan several main meals. Plan some quick meals for busy nights. You also can double some recipes that freeze well.  Then you can save half for other busy nights when you don't have time to cook. · Make sure you have the ingredients you need for your recipes. If you're running low on basic items, put these items on your shopping list too. · List foods that you use to make breakfasts, lunches, and snacks. List plenty of fruits and vegetables. · Post this list on the refrigerator. Add to it as you think of more things you need. · Take the list to the store to do your weekly shopping. Follow-up care is a key part of your treatment and safety. Be sure to make and go to all appointments, and call your doctor if you are having problems. It's also a good idea to know your test results and keep a list of the medicines you take. Where can you learn more? Go to http://www.gray.com/  Enter M193 in the search box to learn more about \"Learning About Meal Planning for Diabetes. \"  Current as of: December 20, 2019               Content Version: 12.6  © 3928-8567 [employer]. Care instructions adapted under license by [employer] (which disclaims liability or warranty for this information). If you have questions about a medical condition or this instruction, always ask your healthcare professional. [address] any warranty or liability for your use of this information. Learning About Mindfulness for Stress  What are mindfulness and stress? Stress is what you feel when you have to handle more than you are used to. A lot of things can cause stress. You may feel stress when you go on a job interview, take a test, or run a race. This kind of short-term stress is normal and even useful. It can help you if you need to work hard or react quickly. Stress also can last a long time. Long-term stress is caused by stressful situations or events. Examples of long-term stress include long-term health problems, ongoing problems at work, and conflicts in your family.  Long-term stress can harm your health. Mindfulness is a focus only on things happening in the present moment. It's a process of purposefully paying attention to and being aware of your surroundings, your emotions, your thoughts, and how your body feels. You are aware of these things, but you aren't judging these experiences as \"good\" or \"bad. \" Mindfulness can help you learn to calm your mind and body to help you cope with illness, pain, and stress. How does mindfulness help to relieve stress? Mindfulness can help quiet your mind and relax your body. Studies show that it can help some people sleep better, feel less anxious, and bring their blood pressure down. And it's been shown to help some people live and cope better with certain health problems like heart disease, depression, chronic pain, and cancer. How do you practice mindfulness? To be mindful is to pay attention, to be present, and to be accepting. · When you're mindful, you do just one thing and you pay close attention to that one thing. For example, you may sit quietly and notice your emotions or how your food tastes and smells. · When you're present, you focus on the things that are happening right now. You let go of your thoughts about the past and the future. When you dwell on the past or the future, you miss moments that can heal and strengthen you. You may miss moments like hearing a child laugh or seeing a friendly face when you think you're all alone. · When you're accepting, you don't  the present moment. Instead you accept your thoughts and feelings as they come. You can practice anytime, anywhere, and in any way you choose. You can practice in many ways. Here are a few ideas:  · While doing your chores, like washing the dishes, let your mind focus on what's in your hand. What does the dish feel like? Is the water warm or cold? · Go outside and take a few deep breaths. What is the air like? Is it warm or cold?   · When you can, take some time at the start of your day to sit alone and think. · Take a slow walk by yourself. Count your steps while you breathe in and out. · Try yoga breathing exercises, stretches, and poses to strengthen and relax your muscles. · At work, if you can, try to stop for a few moments each hour. Note how your body feels. Let yourself regroup and let your mind settle before you return to what you were doing. · If you struggle with anxiety or \"worry thoughts,\" imagine your mind as a blue kraig and your worry thoughts as clouds. Now imagine those worry thoughts floating across your mind's kraig. Just let them pass by as you watch. Follow-up care is a key part of your treatment and safety. Be sure to make and go to all appointments, and call your doctor if you are having problems. It's also a good idea to know your test results and keep a list of the medicines you take. Where can you learn more? Go to http://www.gray.com/  Enter M676 in the search box to learn more about \"Learning About Mindfulness for Stress. \"  Current as of: December 16, 2019               Content Version: 12.6  © 4630-2069 [employer]. Care instructions adapted under license by [employer] (which disclaims liability or warranty for this information). If you have questions about a medical condition or this instruction, always ask your healthcare professional. [address] any warranty or liability for your use of this information.

## 2021-02-09 ENCOUNTER — OFFICE VISIT (OUTPATIENT)
Dept: FAMILY MEDICINE CLINIC | Age: 36
End: 2021-02-09
Payer: MEDICAID

## 2021-02-09 VITALS
DIASTOLIC BLOOD PRESSURE: 80 MMHG | TEMPERATURE: 98.7 F | HEART RATE: 88 BPM | HEIGHT: 62 IN | WEIGHT: 293 LBS | SYSTOLIC BLOOD PRESSURE: 137 MMHG | BODY MASS INDEX: 53.92 KG/M2 | OXYGEN SATURATION: 98 %

## 2021-02-09 DIAGNOSIS — I15.2 HYPERTENSION ASSOCIATED WITH DIABETES (HCC): ICD-10-CM

## 2021-02-09 DIAGNOSIS — E11.65 UNCONTROLLED TYPE 2 DIABETES MELLITUS WITH HYPERGLYCEMIA (HCC): Primary | ICD-10-CM

## 2021-02-09 DIAGNOSIS — N64.4 BREAST PAIN: ICD-10-CM

## 2021-02-09 DIAGNOSIS — F41.8 DEPRESSION WITH ANXIETY: ICD-10-CM

## 2021-02-09 DIAGNOSIS — K21.9 GASTROESOPHAGEAL REFLUX DISEASE, UNSPECIFIED WHETHER ESOPHAGITIS PRESENT: ICD-10-CM

## 2021-02-09 DIAGNOSIS — L72.9 SKIN CYST: ICD-10-CM

## 2021-02-09 DIAGNOSIS — F33.0 MAJOR DEPRESSIVE DISORDER, RECURRENT EPISODE, MILD (HCC): ICD-10-CM

## 2021-02-09 DIAGNOSIS — E11.59 HYPERTENSION ASSOCIATED WITH DIABETES (HCC): ICD-10-CM

## 2021-02-09 PROCEDURE — 99214 OFFICE O/P EST MOD 30 MIN: CPT | Performed by: FAMILY MEDICINE

## 2021-02-09 RX ORDER — HYDROCHLOROTHIAZIDE 12.5 MG/1
12.5 CAPSULE ORAL DAILY
Qty: 90 CAP | Refills: 1 | Status: SHIPPED | OUTPATIENT
Start: 2021-02-09 | End: 2022-01-14 | Stop reason: SDUPTHER

## 2021-02-09 RX ORDER — PIOGLITAZONEHYDROCHLORIDE 30 MG/1
45 TABLET ORAL DAILY
Qty: 135 TAB | Refills: 1 | Status: SHIPPED | OUTPATIENT
Start: 2021-02-09 | End: 2021-02-11 | Stop reason: DRUGHIGH

## 2021-02-09 RX ORDER — SERTRALINE HYDROCHLORIDE 50 MG/1
50 TABLET, FILM COATED ORAL DAILY
Qty: 90 TAB | Refills: 1 | Status: SHIPPED | OUTPATIENT
Start: 2021-02-09 | End: 2021-12-09 | Stop reason: SDUPTHER

## 2021-02-09 RX ORDER — LISINOPRIL 5 MG/1
5 TABLET ORAL DAILY
Qty: 90 TAB | Refills: 1 | Status: SHIPPED | OUTPATIENT
Start: 2021-02-09 | End: 2022-04-12 | Stop reason: SDUPTHER

## 2021-02-09 RX ORDER — FAMOTIDINE 20 MG/1
20 TABLET, FILM COATED ORAL 2 TIMES DAILY
Qty: 180 TAB | Refills: 1 | Status: SHIPPED | OUTPATIENT
Start: 2021-02-09 | End: 2021-03-12 | Stop reason: ALTCHOICE

## 2021-02-09 RX ORDER — INSULIN GLARGINE 100 [IU]/ML
15 INJECTION, SOLUTION SUBCUTANEOUS
Qty: 1 VIAL | Refills: 2 | Status: SHIPPED | OUTPATIENT
Start: 2021-02-09 | End: 2021-10-06 | Stop reason: SDUPTHER

## 2021-02-09 RX ORDER — ISOPROPYL ALCOHOL 70 ML/100ML
SWAB TOPICAL
Qty: 200 PAD | Refills: 2 | Status: SHIPPED | OUTPATIENT
Start: 2021-02-09

## 2021-02-09 NOTE — PROGRESS NOTES
No chief complaint on file. 1. Have you been to the ER, urgent care clinic since your last visit? Hospitalized since your last visit? No    2. Have you seen or consulted any other health care providers outside of the [address] [place] [address] since your last visit? Include any pap smears or colon screening. No    3 most recent PHQ Screens 2/9/2021   Little interest or pleasure in doing things Not at all   Feeling down, depressed, irritable, or hopeless Not at all   Total Score PHQ 2 0   Trouble falling or staying asleep, or sleeping too much -   Feeling tired or having little energy -   Poor appetite, weight loss, or overeating -   Feeling bad about yourself - or that you are a failure or have let yourself or your family down -   Trouble concentrating on things such as school, work, reading, or watching TV -   Moving or speaking so slowly that other people could have noticed; or the opposite being so fidgety that others notice -   Thoughts of being better off dead, or hurting yourself in some way -   PHQ 9 Score -   How difficult have these problems made it for you to do your work, take care of your home and get along with others -     ELSIE 2/7 2/9/2021 9/16/2020 5/22/2020   Feeling nervous, anxious or on edge? 0 0 0   Not being able to stop or control worrying? 0 3 2   ELSIE-2 Subtotal 0 3 2   Worrying too much about different things? - 3 2   Trouble relaxing? - 0 2   Being so restless that it is hard to sit still? - 1 0   Becoming easily annoyed or irritable? - 1 2   Feeling afraid as if something awful might happen? - 3 1   ELSIE-7 Total Score - 11 9           Jan 28th, 2021 Patient states she had the 1st dose of the COVID vaccine. Received at [facility]. Patient states last exam was last year. [place]

## 2021-02-09 NOTE — PROGRESS NOTES
[facility] Note    Assessment/ Plan:   Diagnoses and all orders for this visit:    1. Uncontrolled type 2 diabetes mellitus with hyperglycemia (HCC)  -     insulin glargine (LANTUS) 100 unit/mL injection; 15 Units by SubCUTAneous route nightly. -     alcohol swabs (Alcohol Pads) padm; Use to clear skin before SC injections    2. Hypertension associated with diabetes ([address]  -     lisinopriL (PRINIVIL, ZESTRIL) 5 mg tablet; Take 1 Tab by mouth daily. -     hydroCHLOROthiazide (MICROZIDE) 12.5 mg capsule; Take 1 Cap by mouth daily. 3. Major depressive disorder, recurrent episode, mild (HCC)  -     sertraline (ZOLOFT) 50 mg tablet; Take 1 Tab by mouth daily. 4. Depression with anxiety  -     sertraline (ZOLOFT) 50 mg tablet; Take 1 Tab by mouth daily. 5. Breast pain  -     US BREAST LT LIMITED=<3 QUAD; Future  -     US BREAST RT LIMITED=<3 QUAD; Future    6. Skin cyst  -     US BREAST LT LIMITED=<3 QUAD; Future    7. Gastroesophageal reflux disease, unspecified whether esophagitis present  -     famotidine (PEPCID) 20 mg tablet; Take 1 Tab by mouth two (2) times a day. Recommendations based on history, physical exam and review of pertinent labs, studies and medications:   Diabetes remains uncontrolled. Add insulin. Diabetic diet encouraged. Follow up in 1 month    Blood pressure is well controlled. Continue current regimen. DASH Diet recommended. Recheck in office in 1 month. Start pepcid for GERD symptoms  Skin cyst of left breast, superficial and benign appearing will ultrasound breasts due to breast pain. Diet and lifestyle modification encouraged for weight loss and chronic disease prevention/ management. Mood disorder well controlled. On the basis of positive PHQ-9 screening (PHQ 9 Score: 4), sertraline dose increased at patient request..  Encouraged counseling for mood disorder. Follow up with specialists per routine.     Educated patient on red flag symptoms to warrant return to clinic or emergency room visit. I have discussed the diagnosis with the patient and the intended plan as seen in the above orders. The patient has been offered or received an after-visit summary and questions were answered concerning future plans. I have discussed medication side effects and warnings with the patient as well. Follow-up and Dispositions    · Return in about 4 weeks (around 3/9/2021) for Follow Up diabetes with A1C. Subjective:     Chief Complaint   Patient presents with    Diabetes     follow up     Padmini Combs is a 37y.o. year old female who presents for evaluation of the following:    DMII with Microalbuminuria:  Chronic  Home monitoring: No log in office  Key Antihyperglycemic Medications             insulin glargine (LANTUS) 100 unit/mL injection (Taking) 15 Units by SubCUTAneous route nightly. pioglitazone (ACTOS) 30 mg tablet (Taking/Discontinued) Take 1.5 Tabs by mouth daily. glipiZIDE (GLUCOTROL) 10 mg tablet (Taking) TAKE 1 TABLET BY MOUTH TWICE A DAY    metFORMIN ER (GLUCOPHAGE XR) 500 mg tablet (Taking) TAKE 2 TABLETS BY MOUTH TWICE A DAY. liraglutide (VICTOZA) 0.6 mg/0.1 mL (18 mg/3 mL) pnij Inject 0.6 mg once daily for 1 week, then increase to 1.2 mg once daily      Preivoius Tx: Yogi Isidro (stopped due to yeast infection)  Complication: Microalbuminuria   Rosas polyuria, polydipsia   Eye: Done   Lab Results   Component Value Date/Time    Hemoglobin A1c 10.0 (H) 12/16/2020 11:24 AM    Hemoglobin A1c (POC) 12.0 09/16/2020 11:17 AM        HTN with DMII:   Chronic > 1 year  No home monitoring log in office. Key CAD CHF Meds             lisinopriL (PRINIVIL, ZESTRIL) 5 mg tablet (Taking) Take 1 Tab by mouth daily. hydroCHLOROthiazide (MICROZIDE) 12.5 mg capsule (Taking) Take 1 Cap by mouth daily.     atorvastatin (LIPITOR) 40 mg tablet (Taking) TAKE 1 TABLET BY MOUTH EVERY DAY      Denies chest pain, shortness of breath, leg swelling  BP Readings from Last 3 Encounters:   02/09/21 137/80   01/15/21 110/68   12/29/20 (!) 137/94       Obesity:   Diet: unrestricted  Previous Attempt: forskolin, zehra clense  Actiivyt: Walking 3 days per week with Fredy  Last Weight Metrics:  Weight Loss Metrics 2/9/2021 1/15/2021 12/29/2020 12/16/2020 11/16/2020 10/14/2020 9/16/2020   Today's Wt 318 lb 6.4 oz 313 lb 3.2 oz 325 lb 322 lb 6.4 oz 320 lb 12.8 oz 311 lb 303 lb 12.8 oz   BMI 58.24 kg/m2 57.29 kg/m2 59.44 kg/m2 58.97 kg/m2 58.68 kg/m2 56.88 kg/m2 55.57 kg/m2       Depression and Anxiety:  Mood: \"better\"  Previous Trigger: relationship issues   No active suicidal ideation  No history of admission  Managed by PCP  Key Psychotherapeutic Meds             sertraline (ZOLOFT) 50 mg tablet (Taking) Take 1 Tab by mouth daily. ELSIE:0  3 most recent PHQ Screens 2/9/2021   Little interest or pleasure in doing things Several days   Feeling down, depressed, irritable, or hopeless Not at all   Total Score PHQ 2 1   Trouble falling or staying asleep, or sleeping too much Not at all   Feeling tired or having little energy More than half the days   Poor appetite, weight loss, or overeating Several days   Feeling bad about yourself - or that you are a failure or have let yourself or your family down Not at all   Trouble concentrating on things such as school, work, reading, or watching TV Not at all   Moving or speaking so slowly that other people could have noticed; or the opposite being so fidgety that others notice Not at all   Thoughts of being better off dead, or hurting yourself in some way Not at all   PHQ 9 Score 4   How difficult have these problems made it for you to do your work, take care of your home and get along with others -       GERD:  Heartburn. Patient requests treatment    Breast pain  Bilateral  Left breast with mass  Denies nipple discharge    Review of Systems   Pertinent positives and negative per HPI.  All other systems  reviewed are negative for a Comprehensive ROS (10+). Past medical history, social history, family history reviewed. Medications reconciled. Objective:     Vitals:    02/09/21 1033   BP: 137/80   Pulse: 88   Temp: 98.7 °F (37.1 °C)   TempSrc: Temporal   SpO2: 98%   Weight: 318 lb 6.4 oz (144.4 kg)   Height: 5' 2\" (1.575 m)       Physical Examination:  General: Alert, cooperative, no distress, appears stated age. Obese  Eyes: Conjunctivae clear. Pupils equally round and reactive to light, Extraocular muscles intact. Ears: Normal external ear canals both ears. Nose: Nares normal. Septum midline. Mucosa normal. No drainage or sinus tenderness. Mouth/Throat: Lips, mucosa, and tongue normal. No oropharyngeal erythema. No tonsillar enlargement or exudate. Neck: Supple, symmetrical, trachea midline, no adenopathy. No thyroid enlargement/tenderness/nodules  Respiratory: Breathing comfortably, in no acute respiratory distress. Clear to auscultation bilaterally. Normal inspiratory and expiratory ratio. Cardiovascular: Regular rate and rhythm, S1, S2 normal, no murmur, click, rub or gallop.   -Extremities no edema. Pulses 2+ and symmetric radial and dorsalis pedis   Abdomen: Soft, non-tender, not distended. Bowel sounds normal. No masses or organomegaly. Breasts: breasts appear normal, no suspicious masses, no skin or nipple changes or axillary nodes. - left breast with about 1 cm skin nodule with white discoloration , not tender no overlying erythema  MSK: Extremities normal appearing, atraumatic, no effusion. Gait steady and unassisted. Skin: Skin color, texture, turgor normal. No rashes or lesions on exposed skin. Lymph nodes: Cervical, supraclavicular nodes normal.  Neurologic: Cranial nerves II-XII intact. Strength 5/5 grossly. Sensation and reflexes normal throughout. Psychiatric: Affect appropriate. Mood euthymic.  Thoughts logical. Speech volume and speed normal      Signed,    Belenda Heady, MD  2/9/2021

## 2021-02-10 NOTE — TELEPHONE ENCOUNTER
Dr. Anju Gan,          The Actos 30 mg 1.5 tabs daily is not covered by patients insurance. A suggested/covered alternative is the Actos 45 mg 1 tab daily. Please review and prescribe if appropriate. Thank you. Requested Prescriptions     Pending Prescriptions Disp Refills    pioglitazone (ACTOS) 45 mg tablet 90 Tab 1     Sig: Take 1 Tab by mouth daily.

## 2021-02-11 RX ORDER — PIOGLITAZONEHYDROCHLORIDE 45 MG/1
45 TABLET ORAL DAILY
Qty: 90 TAB | Refills: 1 | Status: SHIPPED | OUTPATIENT
Start: 2021-02-11 | End: 2021-06-18 | Stop reason: SINTOL

## 2021-02-12 NOTE — PATIENT INSTRUCTIONS
Weight Loss Tips:  Remember this is like a part time job so your motivation and commitment is key to your success. Mindset  Weight loss like any other behavior change starts in your mind. Think hard about what your motivates you to lose weight then meditate on that. Remind yourself of your motivation  with phone alarms, scheduled meditation time, vision board, journal- just to name a few ideas. Have realistic goals. We expect with diligent healthy diet and physical activity you can lose 5% of your body weight in 3  months. Wt in lbs x 0.05 = #lbs you should lose in 3 months. Make food and activity changes with a goal of CONSISTENCY not perfection. Food  Start eating differently. Most of your weight loss and gain is from what you eat. Use small plates only  Drink 2 liters (1/2 gallon) of water every day  HALF of every meal should be fruit or vegetables  Try meal prepping on Sunday (or your day off) with new different vegetables. Consider meal prep service such as Cleaneatz.com, wepremeals. com  Replace soda with diet soda or other zero sugar drinks (selter water just fine)  Consider using the [employer] netta for calorie counting. Goal 0185-2923 calories per day    Activity  Staying physically active will help you lose more weight and can help you get over the plateau when you weight just  won't change any more with diet. Start exercise at least 5 days per week for 40 minutes. Consider [employer] training netta for home exercises. You can start with walking. I suggest walking at a speed of at least 3.5-4.5mph to for the weight loss benefit. Increase your speed or distance every 2 weeks. Do some slow stretching daily of legs, arms and back. Consider adding weight training with light weights at home or at the gym. See a doctor or a physical training for  instructions in order to avoid injuries from doing muscle training incorrectly.   Free fitness program in [place]: [employer] org/program/fitness-warriors/         Learning About Meal Planning for Diabetes  Why plan your meals? Meal planning can be a key part of managing diabetes. Planning meals and snacks with the right balance of carbohydrate, protein, and fat can help you keep your blood sugar at the target level you set with your doctor. You don't have to eat special foods. You can eat what your family eats, including sweets once in a while. But you do have to pay attention to how often you eat and how much you eat of certain foods. You may want to work with a dietitian or a certified diabetes educator. He or she can give you tips and meal ideas and can answer your questions about meal planning. This health professional can also help you reach a healthy weight if that is one of your goals. What plan is right for you? Your dietitian or diabetes educator may suggest that you start with the plate format or carbohydrate counting. The plate format  The plate format is a simple way to help you manage how you eat. You plan meals by learning how much space each food should take on a plate. Using the plate format helps you spread carbohydrate throughout the day. It can make it easier to keep your blood sugar level within your target range. It also helps you see if you're eating healthy portion sizes. To use the plate format, you put non-starchy vegetables on half your plate. Add meat or meat substitutes on one-quarter of the plate. Put a grain or starchy vegetable (such as brown rice or a potato) on the final quarter of the plate. You can add a small piece of fruit and some low-fat or fat-free milk or yogurt, depending on your carbohydrate goal for each meal.  Here are some tips for using the plate format:  · Make sure that you are not using an oversized plate. A 9-inch plate is best. Many restaurants use larger plates. · Get used to using the plate format at home.  Then you can use it when you eat out.  · Write down your questions about using the plate format. Talk to your doctor, a dietitian, or a diabetes educator about your concerns. Carbohydrate counting  With carbohydrate counting, you plan meals based on the amount of carbohydrate in each food. Carbohydrate raises blood sugar higher and more quickly than any other nutrient. It is found in desserts, breads and cereals, and fruit. It's also found in starchy vegetables such as potatoes and corn, grains such as rice and pasta, and milk and yogurt. Spreading carbohydrate throughout the day helps keep your blood sugar levels within your target range. Your daily amount depends on several things, including your weight, how active you are, which diabetes medicines you take, and what your goals are for your blood sugar levels. A registered dietitian or diabetes educator can help you plan how much carbohydrate to include in each meal and snack. A guideline for your daily amount of carbohydrate is:  · 45 to 60 grams at each meal. That's about the same as 3 to 4 carbohydrate servings. · 15 to 20 grams at each snack. That's about the same as 1 carbohydrate serving. The Nutrition Facts label on packaged foods tells you how much carbohydrate is in a serving of the food. First, look at the serving size on the food label. Is that the amount you eat in a serving? All of the nutrition information on a food label is based on that serving size. So if you eat more or less than that, you'll need to adjust the other numbers. Total carbohydrate is the next thing you need to look for on the label. If you count carbohydrate servings, one serving of carbohydrate is 15 grams. For foods that don't come with labels, such as fresh fruits and vegetables, you'll need a guide that lists carbohydrate in these foods. Ask your doctor, dietitian, or diabetes educator about books or other nutrition guides you can use.   If you take insulin, you need to know how many grams of carbohydrate are in a meal. This lets you know how much rapid-acting insulin to take before you eat. If you use an insulin pump, you get a constant rate of insulin during the day. So the pump must be programmed at meals to give you extra insulin to cover the rise in blood sugar after meals. When you know how much carbohydrate you will eat, you can take the right amount of insulin. Or, if you always use the same amount of insulin, you need to make sure that you eat the same amount of carbohydrate at meals. If you need more help to understand carbohydrate counting and food labels, ask your doctor, dietitian, or diabetes educator. How do you get started with meal planning? Here are some tips to get started:  · Plan your meals a week at a time. Don't forget to include snacks too. · Use cookbooks or online recipes to plan several main meals. Plan some quick meals for busy nights. You also can double some recipes that freeze well. Then you can save half for other busy nights when you don't have time to cook. · Make sure you have the ingredients you need for your recipes. If you're running low on basic items, put these items on your shopping list too. · List foods that you use to make breakfasts, lunches, and snacks. List plenty of fruits and vegetables. · Post this list on the refrigerator. Add to it as you think of more things you need. · Take the list to the store to do your weekly shopping. Follow-up care is a key part of your treatment and safety. Be sure to make and go to all appointments, and call your doctor if you are having problems. It's also a good idea to know your test results and keep a list of the medicines you take. Where can you learn more? Go to http://www.gray.com/  Enter Z971 in the search box to learn more about \"Learning About Meal Planning for Diabetes. \"  Current as of: December 20, 2019               Content Version: 12.6  © 2595-2809 [employer], [facility].    Care instructions adapted under license by [employer] (which disclaims liability or warranty for this information). If you have questions about a medical condition or this instruction, always ask your healthcare professional. [address] any warranty or liability for your use of this information.

## 2021-02-17 ENCOUNTER — HOSPITAL ENCOUNTER (OUTPATIENT)
Dept: MAMMOGRAPHY | Age: 36
Discharge: HOME | End: 2021-02-17
Payer: MEDICAID

## 2021-02-17 DIAGNOSIS — N64.4 BREAST PAIN: ICD-10-CM

## 2021-02-17 DIAGNOSIS — Z12.31 VISIT FOR SCREENING MAMMOGRAM: ICD-10-CM

## 2021-02-17 DIAGNOSIS — L72.9 SKIN CYST: ICD-10-CM

## 2021-02-17 PROCEDURE — 77067 SCR MAMMO BI INCL CAD: CPT

## 2021-02-17 NOTE — PROGRESS NOTES
Patient presented with intermittent bilateral breast pain and a dermal bump. No imaging required for non-focal intermittent breast pain or skin bump. I gave the patient the option of having a baseline screening mammogram.  She called her insurance company and was told they would cover a screening mammogram- so I proceeded with doing a bilateral screening mammogram.  Results of mammogram given to patient- Birads 2 -normal mammogram.  I told patient to discuss her breast pain and the skin bump with her physician.

## 2021-02-22 RX ORDER — IBUPROFEN 200 MG
CAPSULE ORAL
Qty: 300 STRIP | Refills: 0 | Status: SHIPPED | OUTPATIENT
Start: 2021-02-22

## 2021-03-11 ENCOUNTER — TELEPHONE (OUTPATIENT)
Dept: FAMILY MEDICINE CLINIC | Age: 36
End: 2021-03-11

## 2021-03-11 NOTE — TELEPHONE ENCOUNTER
Chief Complaint   Patient presents with    Prior Auth     Sherry BCise 2MG/0.85ML auto-injectors;  APPROVED:  Request Reference Number: [id number]. BYDUREON BC INJ 2/0.85ML is approved through 03/11/2022. For further questions, call [employer] at [phone].

## 2021-03-12 ENCOUNTER — OFFICE VISIT (OUTPATIENT)
Dept: FAMILY MEDICINE CLINIC | Age: 36
End: 2021-03-12
Payer: MEDICAID

## 2021-03-12 VITALS
HEART RATE: 85 BPM | OXYGEN SATURATION: 97 % | BODY MASS INDEX: 53.92 KG/M2 | HEIGHT: 62 IN | WEIGHT: 293 LBS | TEMPERATURE: 98.1 F | SYSTOLIC BLOOD PRESSURE: 133 MMHG | RESPIRATION RATE: 18 BRPM | DIASTOLIC BLOOD PRESSURE: 79 MMHG

## 2021-03-12 DIAGNOSIS — R07.9 CHEST PAIN, UNSPECIFIED TYPE: ICD-10-CM

## 2021-03-12 DIAGNOSIS — E11.21 TYPE 2 DIABETES WITH NEPHROPATHY (HCC): Primary | ICD-10-CM

## 2021-03-12 DIAGNOSIS — K21.9 GASTROESOPHAGEAL REFLUX DISEASE, UNSPECIFIED WHETHER ESOPHAGITIS PRESENT: ICD-10-CM

## 2021-03-12 DIAGNOSIS — E11.59 HYPERTENSION ASSOCIATED WITH DIABETES (HCC): ICD-10-CM

## 2021-03-12 DIAGNOSIS — E66.01 OBESITY, MORBID (HCC): ICD-10-CM

## 2021-03-12 DIAGNOSIS — I15.2 HYPERTENSION ASSOCIATED WITH DIABETES (HCC): ICD-10-CM

## 2021-03-12 PROCEDURE — 93000 ELECTROCARDIOGRAM COMPLETE: CPT | Performed by: FAMILY MEDICINE

## 2021-03-12 PROCEDURE — 99214 OFFICE O/P EST MOD 30 MIN: CPT | Performed by: FAMILY MEDICINE

## 2021-03-12 RX ORDER — PHENOL/SODIUM PHENOLATE
20 AEROSOL, SPRAY (ML) MUCOUS MEMBRANE DAILY
Qty: 30 TAB | Refills: 0 | Status: SHIPPED | OUTPATIENT
Start: 2021-03-12 | End: 2021-06-18

## 2021-03-12 NOTE — PROGRESS NOTES
Chief Complaint   Patient presents with    Diabetes     FOLLOW UP       1. Have you been to the ER, urgent care clinic since your last visit? Hospitalized since your last visit? No    2. Have you seen or consulted any other health care providers outside of the [address] [place] [address] since your last visit? Include any pap smears or colon screening.  No    3 most recent PHQ Screens 2/9/2021   Little interest or pleasure in doing things Several days   Feeling down, depressed, irritable, or hopeless Not at all   Total Score PHQ 2 1   Trouble falling or staying asleep, or sleeping too much Not at all   Feeling tired or having little energy More than half the days   Poor appetite, weight loss, or overeating Several days   Feeling bad about yourself - or that you are a failure or have let yourself or your family down Not at all   Trouble concentrating on things such as school, work, reading, or watching TV Not at all   Moving or speaking so slowly that other people could have noticed; or the opposite being so fidgety that others notice Not at all   Thoughts of being better off dead, or hurting yourself in some way Not at all   PHQ 9 Score 4   How difficult have these problems made it for you to do your work, take care of your home and get along with others -

## 2021-03-12 NOTE — PROGRESS NOTES
[facility] Note    Assessment/ Plan:   Diagnoses and all orders for this visit:    1. Type 2 diabetes with nephropathy (HCC)  -     HEMOGLOBIN A1C WITH EAG; Future    2. Hypertension associated with diabetes ([facility]    3. Gastroesophageal reflux disease, unspecified whether esophagitis present  -     Omeprazole delayed release (PRILOSEC D/R) 20 mg tablet; Take 1 Tab by mouth daily. 4. Chest pain, unspecified type  -     Omeprazole delayed release (PRILOSEC D/R) 20 mg tablet; Take 1 Tab by mouth daily.  -     AMB POC EKG ROUTINE W/ 12 LEADS, INTER & REP    5. Obesity, morbid ([facility]      Recommendations based on history, physical exam and review of pertinent labs, studies and medications:   Diabetes, improving per home readings but still uncontrolled with multiple readings greater than 150. Continue current regimen and restart Victoza. Noted patient self discontinued pioglitazone due to side effects and may continue off this. A1c due in 1 week, patient will return to lab for this. Blood pressure is well controlled. Continue current regimen. DASH Diet recommended. Diet and lifestyle modification encouraged for weight loss and chronic disease prevention/ management. - add carrots or broccoli snacks, reduce portion sizes  GERD, uncontrolled with chest discomfort improved with Tums. Add Prilosec to treat. Chest pain, likely related to GERD. EKG reassuring in office with normal sinus rhythm, heart rate 87. If symptoms recur will refer to cardiology for stress testing. Mood currently well controlled. Encouraged counseling for mood disorder. Follow up with specialists per routine. Educated patient on red flag symptoms to warrant return to clinic or emergency room visit. I have discussed the diagnosis with the patient and the intended plan as seen in the above orders.   The patient has been offered or received an after-visit summary and questions were answered concerning future plans. I have discussed medication side effects and warnings with the patient as well. Follow-up and Dispositions    · Return in about 3 months (around 6/12/2021) for Follow Up diabetes. Subjective:     Chief Complaint   Patient presents with    Diabetes     FOLLOW UP     Konrad Michaud is a 37y.o. year old female who presents for evaluation of the following:      DMII with Microalbuminuria:  Chronic  Home monitoring: No log in office  Key Antihyperglycemic Medications             pioglitazone (ACTOS) 45 mg tablet (Taking) Take 1 Tab by mouth daily. insulin glargine (LANTUS) 100 unit/mL injection (Taking) 15 Units by SubCUTAneous route nightly. glipiZIDE (GLUCOTROL) 10 mg tablet (Taking) TAKE 1 TABLET BY MOUTH TWICE A DAY    metFORMIN ER (GLUCOPHAGE XR) 500 mg tablet (Taking) TAKE 2 TABLETS BY MOUTH TWICE A DAY. liraglutide (VICTOZA) 0.6 mg/0.1 mL (18 mg/3 mL) pnij Inject 0.6 mg once daily for 1 week, then increase to 1.2 mg once daily      - missed today's and last nights po medication doses  - stopped taking voctoza fdue to cocern for low sugar but not actually experiecned0   Stopped pioglitaone (vomitng)  Preivoius Tx: farxigo (stopped due to yeast infection)  Complication: Microalbuminuria   Rosas polyuria, polydipsia   Eye: Done   Lab Results   Component Value Date/Time    Hemoglobin A1c 10.0 (H) 12/16/2020 11:24 AM    Hemoglobin A1c (POC) 12.0 09/16/2020 11:17 AM       HTN with DMII:   Chronic > 1 year  No home monitoring log in office. Key CAD CHF Meds             lisinopriL (PRINIVIL, ZESTRIL) 5 mg tablet (Taking) Take 1 Tab by mouth daily. hydroCHLOROthiazide (MICROZIDE) 12.5 mg capsule (Taking) Take 1 Cap by mouth daily.     atorvastatin (LIPITOR) 40 mg tablet (Taking) TAKE 1 TABLET BY MOUTH EVERY DAY      Denies chest pain, shortness of breath, leg swelling  Last Weight Metrics:  Weight Loss Metrics 3/12/2021 2/9/2021 1/15/2021 12/29/2020 12/16/2020 11/16/2020 10/14/2020   Today's Wt 318 lb 318 lb 6.4 oz 313 lb 3.2 oz 325 lb 322 lb 6.4 oz 320 lb 12.8 oz 311 lb   BMI 58.16 kg/m2 58.24 kg/m2 57.29 kg/m2 59.44 kg/m2 58.97 kg/m2 58.68 kg/m2 56.88 kg/m2     Obesity:   Diet: unrestricted  Previous Attempt: forskolin, zehra clense  Actiivyt: Walking 3 days per week with Fredy  Last Weight Metrics:  Weight Loss Metrics 3/12/2021 2/9/2021 1/15/2021 12/29/2020 12/16/2020 11/16/2020 10/14/2020   Today's Wt 318 lb 318 lb 6.4 oz 313 lb 3.2 oz 325 lb 322 lb 6.4 oz 320 lb 12.8 oz 311 lb   BMI 58.16 kg/m2 58.24 kg/m2 57.29 kg/m2 59.44 kg/m2 58.97 kg/m2 58.68 kg/m2 56.88 kg/m2       Depression and Anxiety:  Mood: \"good\"  Previous Trigger: relationship issues   No active suicidal aideation  No history of admission  Managed by PCP  Key Psychotherapeutic Meds             sertraline (ZOLOFT) 50 mg tablet (Taking) Take 1 Tab by mouth daily.         3 most recent PHQ Screens 2/9/2021   Little interest or pleasure in doing things Several days   Feeling down, depressed, irritable, or hopeless Not at all   Total Score PHQ 2 1   Trouble falling or staying asleep, or sleeping too much Not at all   Feeling tired or having little energy More than half the days   Poor appetite, weight loss, or overeating Several days   Feeling bad about yourself - or that you are a failure or have let yourself or your family down Not at all   Trouble concentrating on things such as school, work, reading, or watching TV Not at all   Moving or speaking so slowly that other people could have noticed; or the opposite being so fidgety that others notice Not at all   Thoughts of being better off dead, or hurting yourself in some way Not at all   PHQ 9 Score 4   How difficult have these problems made it for you to do your work, take care of your home and get along with others -       Sharp Chest Pain:   Onst 2 days ago   Affect breathing   Lasting 1 hour  - improved with tums then recurred midday  States not like heart burn  Trigger: unkneon   Hs anxiety and GERD      Care Team:   Dentist- None  Optho- Name unknwn  GYN- None        Social:   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 All other systems  reviewed are negative for a Comprehensive ROS (10+). Past medical history, social history, family history reviewed. Medications reconciled. Objective:     Vitals:    03/12/21 1000   BP: 133/79   Pulse: 85   Resp: 18   Temp: 98.1 °F (36.7 °C)   TempSrc: Temporal   SpO2: 97%   Weight: 318 lb (144.2 kg)   Height: 5' 2\" (1.575 m)       Physical Examination:  General: Alert, cooperative, no distress, appears stated age. Obese  Eyes: Conjunctivae clear. Pupils equally round and reactive to light, Extraocular muscles intact. Ears: Normal external ear canals both ears. Nose: Nares normal. Septum midline. Mucosa normal. No drainage or sinus tenderness. Mouth/Throat: Lips, mucosa, and tongue normal. No oropharyngeal erythema. No tonsillar enlargement or exudate. Neck: Supple, symmetrical, trachea midline, no adenopathy. No thyroid enlargement/tenderness/nodules  Respiratory: Breathing comfortably, in no acute respiratory distress. Clear to auscultation bilaterally. Normal inspiratory and expiratory ratio. Cardiovascular: Regular rate and rhythm, S1, S2 normal, no murmur, click, rub or gallop.   -Extremities no edema. Pulses 2+ and symmetric radial and dorsalis pedis   Abdomen: Soft, non-tender, not distended. Bowel sounds normal. No masses or organomegaly. MSK: Extremities normal appearing, atraumatic, no effusion. Gait steady and unassisted. Back symmetric, no curvature. Range of motion normal. No Costovertebral angle tenderness. - anterior chest wall tenderness  Skin: Skin color, texture, turgor normal. No rashes or lesions on exposed skin. Lymph nodes: Cervical, supraclavicular nodes normal.  Neurologic: Cranial nerves II-XII intact. Strength 5/5 grossly. Sensation and reflexes normal throughout. Psychiatric: Affect blunted. Mood euthymic.  Thoughts logical. Speech volume and speed normal      Signed,    Scottie Alvarez MD  3/12/2021

## 2021-03-15 NOTE — PATIENT INSTRUCTIONS
Weight Loss Tips:  Remember this is like a part time job so your motivation and commitment is key to your success. Mindset  Weight loss like any other behavior change starts in your mind. Think hard about what your motivates you to lose weight then meditate on that. Remind yourself of your motivation  with phone alarms, scheduled meditation time, vision board, journal- just to name a few ideas. Have realistic goals. We expect with diligent healthy diet and physical activity you can lose 5% of your body weight in 3  months. Wt in lbs x 0.05 = #lbs you should lose in 3 months. Make food and activity changes with a goal of CONSISTENCY not perfection. Food  Start eating differently. Most of your weight loss and gain is from what you eat. Use small plates only  Drink 2 liters (1/2 gallon) of water every day  HALF of every meal should be fruit or vegetables  Try meal prepping on Sunday (or your day off) with new different vegetables. Consider meal prep service such as Cleaneatz.com, wepremeals. com  Replace soda with diet soda or other zero sugar drinks (selter water just fine)  Consider using the [employer] netta for calorie counting. Goal 0127-4312 calories per day    Activity  Staying physically active will help you lose more weight and can help you get over the plateau when you weight just  won't change any more with diet. Start exercise at least 5 days per week for 40 minutes. Consider [employer] training netta for home exercises. You can start with walking. I suggest walking at a speed of at least 3.5-4.5mph to for the weight loss benefit. Increase your speed or distance every 2 weeks. Do some slow stretching daily of legs, arms and back. Consider adding weight training with light weights at home or at the gym. See a doctor or a physical training for  instructions in order to avoid injuries from doing muscle training incorrectly.   Free fitness program in [place]: [employer] org/program/fitness-warriors/         Learning About Meal Planning for Diabetes  Why plan your meals? Meal planning can be a key part of managing diabetes. Planning meals and snacks with the right balance of carbohydrate, protein, and fat can help you keep your blood sugar at the target level you set with your doctor. You don't have to eat special foods. You can eat what your family eats, including sweets once in a while. But you do have to pay attention to how often you eat and how much you eat of certain foods. You may want to work with a dietitian or a certified diabetes educator. He or she can give you tips and meal ideas and can answer your questions about meal planning. This health professional can also help you reach a healthy weight if that is one of your goals. What plan is right for you? Your dietitian or diabetes educator may suggest that you start with the plate format or carbohydrate counting. The plate format  The plate format is a simple way to help you manage how you eat. You plan meals by learning how much space each food should take on a plate. Using the plate format helps you spread carbohydrate throughout the day. It can make it easier to keep your blood sugar level within your target range. It also helps you see if you're eating healthy portion sizes. To use the plate format, you put non-starchy vegetables on half your plate. Add meat or meat substitutes on one-quarter of the plate. Put a grain or starchy vegetable (such as brown rice or a potato) on the final quarter of the plate. You can add a small piece of fruit and some low-fat or fat-free milk or yogurt, depending on your carbohydrate goal for each meal.  Here are some tips for using the plate format:  · Make sure that you are not using an oversized plate. A 9-inch plate is best. Many restaurants use larger plates. · Get used to using the plate format at home.  Then you can use it when you eat out.  · Write down your questions about using the plate format. Talk to your doctor, a dietitian, or a diabetes educator about your concerns. Carbohydrate counting  With carbohydrate counting, you plan meals based on the amount of carbohydrate in each food. Carbohydrate raises blood sugar higher and more quickly than any other nutrient. It is found in desserts, breads and cereals, and fruit. It's also found in starchy vegetables such as potatoes and corn, grains such as rice and pasta, and milk and yogurt. Spreading carbohydrate throughout the day helps keep your blood sugar levels within your target range. Your daily amount depends on several things, including your weight, how active you are, which diabetes medicines you take, and what your goals are for your blood sugar levels. A registered dietitian or diabetes educator can help you plan how much carbohydrate to include in each meal and snack. A guideline for your daily amount of carbohydrate is:  · 45 to 60 grams at each meal. That's about the same as 3 to 4 carbohydrate servings. · 15 to 20 grams at each snack. That's about the same as 1 carbohydrate serving. The Nutrition Facts label on packaged foods tells you how much carbohydrate is in a serving of the food. First, look at the serving size on the food label. Is that the amount you eat in a serving? All of the nutrition information on a food label is based on that serving size. So if you eat more or less than that, you'll need to adjust the other numbers. Total carbohydrate is the next thing you need to look for on the label. If you count carbohydrate servings, one serving of carbohydrate is 15 grams. For foods that don't come with labels, such as fresh fruits and vegetables, you'll need a guide that lists carbohydrate in these foods. Ask your doctor, dietitian, or diabetes educator about books or other nutrition guides you can use.   If you take insulin, you need to know how many grams of carbohydrate are in a meal. This lets you know how much rapid-acting insulin to take before you eat. If you use an insulin pump, you get a constant rate of insulin during the day. So the pump must be programmed at meals to give you extra insulin to cover the rise in blood sugar after meals. When you know how much carbohydrate you will eat, you can take the right amount of insulin. Or, if you always use the same amount of insulin, you need to make sure that you eat the same amount of carbohydrate at meals. If you need more help to understand carbohydrate counting and food labels, ask your doctor, dietitian, or diabetes educator. How do you get started with meal planning? Here are some tips to get started:  · Plan your meals a week at a time. Don't forget to include snacks too. · Use cookbooks or online recipes to plan several main meals. Plan some quick meals for busy nights. You also can double some recipes that freeze well. Then you can save half for other busy nights when you don't have time to cook. · Make sure you have the ingredients you need for your recipes. If you're running low on basic items, put these items on your shopping list too. · List foods that you use to make breakfasts, lunches, and snacks. List plenty of fruits and vegetables. · Post this list on the refrigerator. Add to it as you think of more things you need. · Take the list to the store to do your weekly shopping. Follow-up care is a key part of your treatment and safety. Be sure to make and go to all appointments, and call your doctor if you are having problems. It's also a good idea to know your test results and keep a list of the medicines you take. Where can you learn more? Go to http://www.gray.com/  Enter S355 in the search box to learn more about \"Learning About Meal Planning for Diabetes. \"  Current as of: December 20, 2019               Content Version: 12.6  © 6577-8849 [employer].    Care instructions adapted under license by [employer] (which disclaims liability or warranty for this information). If you have questions about a medical condition or this instruction, always ask your healthcare professional. [address] any warranty or liability for your use of this information. DASH Diet: Care Instructions  Your Care Instructions     The DASH diet is an eating plan that can help lower your blood pressure. DASH stands for Dietary Approaches to Stop Hypertension. Hypertension is high blood pressure. The DASH diet focuses on eating foods that are high in calcium, potassium, and magnesium. These nutrients can lower blood pressure. The foods that are highest in these nutrients are fruits, vegetables, low-fat dairy products, nuts, seeds, and legumes. But taking calcium, potassium, and magnesium supplements instead of eating foods that are high in those nutrients does not have the same effect. The DASH diet also includes whole grains, fish, and poultry. The DASH diet is one of several lifestyle changes your doctor may recommend to lower your high blood pressure. Your doctor may also want you to decrease the amount of sodium in your diet. Lowering sodium while following the DASH diet can lower blood pressure even further than just the DASH diet alone. Follow-up care is a key part of your treatment and safety. Be sure to make and go to all appointments, and call your doctor if you are having problems. It's also a good idea to know your test results and keep a list of the medicines you take. How can you care for yourself at home? Following the DASH diet  · Eat 4 to 5 servings of fruit each day. A serving is 1 medium-sized piece of fruit, ½ cup chopped or canned fruit, 1/4 cup dried fruit, or 4 ounces (½ cup) of fruit juice. Choose fruit more often than fruit juice. · Eat 4 to 5 servings of vegetables each day.  A serving is 1 cup of lettuce or raw leafy vegetables, ½ cup of chopped or cooked vegetables, or 4 ounces (½ cup) of vegetable juice. Choose vegetables more often than vegetable juice. · Get 2 to 3 servings of low-fat and fat-free dairy each day. A serving is 8 ounces of milk, 1 cup of yogurt, or 1 ½ ounces of cheese. · Eat 6 to 8 servings of grains each day. A serving is 1 slice of bread, 1 ounce of dry cereal, or ½ cup of cooked rice, pasta, or cooked cereal. Try to choose whole-grain products as much as possible. · Limit lean meat, poultry, and fish to 2 servings each day. A serving is 3 ounces, about the size of a deck of cards. · Eat 4 to 5 servings of nuts, seeds, and legumes (cooked dried beans, lentils, and split peas) each week. A serving is 1/3 cup of nuts, 2 tablespoons of seeds, or ½ cup of cooked beans or peas. · Limit fats and oils to 2 to 3 servings each day. A serving is 1 teaspoon of vegetable oil or 2 tablespoons of salad dressing. · Limit sweets and added sugars to 5 servings or less a week. A serving is 1 tablespoon jelly or jam, ½ cup sorbet, or 1 cup of lemonade. · Eat less than 2,300 milligrams (mg) of sodium a day. If you limit your sodium to 1,500 mg a day, you can lower your blood pressure even more. Tips for success  · Start small. Do not try to make dramatic changes to your diet all at once. You might feel that you are missing out on your favorite foods and then be more likely to not follow the plan. Make small changes, and stick with them. Once those changes become habit, add a few more changes. · Try some of the following:  ? Make it a goal to eat a fruit or vegetable at every meal and at snacks. This will make it easy to get the recommended amount of fruits and vegetables each day. ? Try yogurt topped with fruit and nuts for a snack or healthy dessert. ? Add lettuce, tomato, cucumber, and onion to sandwiches. ? Combine a ready-made pizza crust with low-fat mozzarella cheese and lots of vegetable toppings.  Try using tomatoes, squash, spinach, broccoli, carrots, cauliflower, and onions. ? Have a variety of cut-up vegetables with a low-fat dip as an appetizer instead of chips and dip. ? Sprinkle sunflower seeds or chopped almonds over salads. Or try adding chopped walnuts or almonds to cooked vegetables. ? Try some vegetarian meals using beans and peas. Add garbanzo or kidney beans to salads. Make burritos and tacos with mashed rosas beans or black beans. Where can you learn more? Go to http://www.sauceda.com/  Enter H967 in the search box to learn more about \"DASH Diet: Care Instructions. \"  Current as of: December 16, 2019               Content Version: 12.6  © 9180-7520 [employer]. Care instructions adapted under license by [employer] (which disclaims liability or warranty for this information). If you have questions about a medical condition or this instruction, always ask your healthcare professional. [place] [address] any warranty or liability for your use of this information.

## 2021-03-22 ENCOUNTER — LAB ONLY (OUTPATIENT)
Dept: FAMILY MEDICINE CLINIC | Age: 36
End: 2021-03-22

## 2021-03-22 DIAGNOSIS — E11.21 TYPE 2 DIABETES WITH NEPHROPATHY (HCC): ICD-10-CM

## 2021-03-23 LAB
EST. AVERAGE GLUCOSE BLD GHB EST-MCNC: 200 MG/DL
HBA1C MFR BLD: 8.6 % (ref 4–5.6)

## 2021-03-25 NOTE — PROGRESS NOTES
As discussed via telephone, your A1C is improving. Continue taking your medications. Try to avoid bread, crackers, cakes, and cookies. Try replacing them with whole foods, like fruits and vegetables. Thank you for allowing me to participate in your care.  Mychart result comment sent
Called patient to discuss results with no response will attempt call again.
coffee

## 2021-03-30 ENCOUNTER — TELEPHONE (OUTPATIENT)
Dept: FAMILY MEDICINE CLINIC | Age: 36
End: 2021-03-30

## 2021-05-24 ENCOUNTER — TELEPHONE (OUTPATIENT)
Dept: FAMILY MEDICINE CLINIC | Age: 36
End: 2021-05-24

## 2021-05-24 NOTE — TELEPHONE ENCOUNTER
Chief Complaint   Patient presents with    Prior Auth     BD Pen Needle Delia 2nd Gen 32G X 4 MM:  APPROVED:  Request Reference Number: [id number]. BD PEN NEEDL MIS 32GX5/32 is approved through 05/24/2022. For further questions, call [employer] at [phone]. Delsa Severance 05/24/2021 THROUGH 05/24/2022. [facility] pharmacy was faxed approval notification at [phone] with confirmation received.   Jett Chavez LPN

## 2021-06-18 ENCOUNTER — OFFICE VISIT (OUTPATIENT)
Dept: FAMILY MEDICINE CLINIC | Age: 36
End: 2021-06-18
Payer: MEDICAID

## 2021-06-18 VITALS
HEIGHT: 62 IN | SYSTOLIC BLOOD PRESSURE: 121 MMHG | HEART RATE: 83 BPM | BODY MASS INDEX: 53.92 KG/M2 | WEIGHT: 293 LBS | RESPIRATION RATE: 18 BRPM | OXYGEN SATURATION: 96 % | DIASTOLIC BLOOD PRESSURE: 77 MMHG | TEMPERATURE: 97.2 F

## 2021-06-18 DIAGNOSIS — E66.01 OBESITY, MORBID (HCC): ICD-10-CM

## 2021-06-18 DIAGNOSIS — F41.8 DEPRESSION WITH ANXIETY: ICD-10-CM

## 2021-06-18 DIAGNOSIS — E11.59 HYPERTENSION ASSOCIATED WITH DIABETES (HCC): ICD-10-CM

## 2021-06-18 DIAGNOSIS — I15.2 HYPERTENSION ASSOCIATED WITH DIABETES (HCC): ICD-10-CM

## 2021-06-18 DIAGNOSIS — E11.21 TYPE 2 DIABETES WITH NEPHROPATHY (HCC): Primary | ICD-10-CM

## 2021-06-18 PROCEDURE — 99214 OFFICE O/P EST MOD 30 MIN: CPT | Performed by: FAMILY MEDICINE

## 2021-06-18 RX ORDER — PEN NEEDLE, DIABETIC 29 G X1/2"
NEEDLE, DISPOSABLE MISCELLANEOUS
COMMUNITY
Start: 2021-06-01 | End: 2022-06-14 | Stop reason: ALTCHOICE

## 2021-06-18 RX ORDER — FAMOTIDINE 20 MG/1
TABLET, FILM COATED ORAL
COMMUNITY
Start: 2021-05-26 | End: 2021-08-18 | Stop reason: SDUPTHER

## 2021-06-18 NOTE — PROGRESS NOTES
[place] [facility] Note    Assessment/ Plan:   Diagnoses and all orders for this visit:    1. Type 2 diabetes with nephropathy (HCC)  -     METABOLIC PANEL, COMPREHENSIVE; Future  -     HEMOGLOBIN A1C WITH EAG; Future  -     REFERRAL TO BARIATRIC SURGERY    2. Hypertension associated with diabetes ([facility] [address]    3. Obesity, morbid ([facility] [address]  -     REFERRAL TO BARIATRIC SURGERY    4. Depression with anxiety      Recommendations based on history, physical exam and review of pertinent labs, studies and medications:   Diabetes, improving. Continue current regimen and restart Victoza. Noted patient self discontinued pioglitazone due to side effects and may continue off this. A1c due in 1 week, patient will return to lab for this. Blood pressure is well controlled. Continue current regimen. DASH Diet recommended. Obesity uncontrolled. Diet and lifestyle modification encouraged for weight loss and chronic disease prevention/ management. Referred for sleeve gastrectomy. Mood currently well controlled. Encouraged counseling for mood disorder. Follow up with specialists per routine. Educated patient on red flag symptoms to warrant return to clinic or emergency room visit. I have discussed the diagnosis with the patient and the intended plan as seen in the above orders. The patient has been offered or received an after-visit summary and questions were answered concerning future plans. I have discussed medication side effects and warnings with the patient as well. Follow-up and Dispositions    · Return in about 3 months (around 2021) for Follow Up. Subjective:     Chief Complaint   Patient presents with    Diabetes     Follow  up     Sangeeta Pack is a 37y.o. year old female who presents for evaluation of the following:      DMII with Microalbuminuria:  Chronic  Home monitorins today.  No readings <70  Key Antihyperglycemic Medications insulin glargine (LANTUS) 100 unit/mL injection (Taking) 15 Units by SubCUTAneous route nightly. glipiZIDE (GLUCOTROL) 10 mg tablet (Taking) TAKE 1 TABLET BY MOUTH TWICE A DAY    liraglutide (VICTOZA) 0.6 mg/0.1 mL (18 mg/3 mL) pnij (Taking) Inject 0.6 mg once daily for 1 week, then increase to 1.2 mg once daily    metFORMIN ER (GLUCOPHAGE XR) 500 mg tablet (Taking) TAKE 2 TABLETS BY MOUTH TWICE A DAY. pioglitazone (ACTOS) 45 mg tablet Take 1 Tab by mouth daily. - missed today's and las   Stopped pioglitaone (vomitng)  Preivoius Tx: Coreen Dorado (stopped due to yeast infection)  Complication: Microalbuminuria   Rosas polyuria, polydipsia   Eye: Done   Lab Results   Component Value Date/Time    Hemoglobin A1c 8.6 (H) 03/22/2021 01:54 PM    Hemoglobin A1c (POC) 12.0 09/16/2020 11:17 AM       HTN with DMII:   Chronic > 1 year  No home monitoring log in office. Key CAD CHF Meds             lisinopriL (PRINIVIL, ZESTRIL) 5 mg tablet (Taking) Take 1 Tab by mouth daily. hydroCHLOROthiazide (MICROZIDE) 12.5 mg capsule (Taking) Take 1 Cap by mouth daily.     atorvastatin (LIPITOR) 40 mg tablet (Taking) TAKE 1 TABLET BY MOUTH EVERY DAY      Denies chest pain, shortness of breath, leg swelling  BP Readings from Last 3 Encounters:   06/18/21 121/77   03/12/21 133/79   02/09/21 137/80       Obesity:   Diet: unrestricted  Previous Attempt: forskolin, zehra clense  Actiivyt: Walking 3 days per week with Fredy  Last Weight Metrics:  Weight Loss Metrics 6/18/2021 3/12/2021 2/9/2021 1/15/2021 12/29/2020 12/16/2020 11/16/2020   Today's Wt 337 lb 318 lb 318 lb 6.4 oz 313 lb 3.2 oz 325 lb 322 lb 6.4 oz 320 lb 12.8 oz   BMI 61.64 kg/m2 58.16 kg/m2 58.24 kg/m2 57.29 kg/m2 59.44 kg/m2 58.97 kg/m2 58.68 kg/m2        Depression and Anxiety:  Mood: \"good\"  Previous Trigger: relationship issues   No active suicidal aideation  No history of admission  Managed by PCP  Key Psychotherapeutic Meds             sertraline (ZOLOFT) 50 mg tablet (Taking) Take 1 Tab by mouth daily. 3 most recent PHQ Screens 6/18/2021   Little interest or pleasure in doing things Not at all   Feeling down, depressed, irritable, or hopeless Not at all   Total Score PHQ 2 0   Trouble falling or staying asleep, or sleeping too much -   Feeling tired or having little energy -   Poor appetite, weight loss, or overeating -   Feeling bad about yourself - or that you are a failure or have let yourself or your family down -   Trouble concentrating on things such as school, work, reading, or watching TV -   Moving or speaking so slowly that other people could have noticed; or the opposite being so fidgety that others notice -   Thoughts of being better off dead, or hurting yourself in some way -   PHQ 9 Score -   How difficult have these problems made it for you to do your work, take care of your home and get along with others -       Care Team:   Dentist- None  Optho- Name unknwn  GYN- None        Social:   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 All other systems  reviewed are negative for a Comprehensive ROS (10+). Past medical history, social history, family history reviewed. Medications reconciled. Objective:     Vitals:    06/18/21 1436   BP: 121/77   Pulse: 83   Resp: 18   Temp: 97.2 °F (36.2 °C)   TempSrc: Temporal   SpO2: 96%   Weight: 337 lb (152.9 kg)   Height: 5' 2\" (1.575 m)       Physical Examination:  General: Alert, cooperative, no distress, appears stated age. Obese  Eyes: Conjunctivae clear. Pupils equally round and reactive to light, Extraocular muscles intact. Ears: Normal external ear canals both ears. Nose: Nares normal. Septum midline. Mucosa normal. No drainage or sinus tenderness. Mouth/Throat: Lips, mucosa, and tongue normal. No oropharyngeal erythema. No tonsillar enlargement or exudate. Neck: Supple, symmetrical, trachea midline, no adenopathy.  No thyroid enlargement/tenderness/nodules  Respiratory: Breathing comfortably, in no acute respiratory distress. Clear to auscultation bilaterally. Normal inspiratory and expiratory ratio. Cardiovascular: Regular rate and rhythm, S1, S2 normal, no murmur, click, rub or gallop.   -Extremities no edema. Pulses 2+ and symmetric radial and dorsalis pedis   Abdomen: Soft, non-tender, not distended. Bowel sounds normal. No masses or organomegaly. MSK: Extremities normal appearing, atraumatic, no effusion. Gait steady and unassisted. Skin: Skin color, texture, turgor normal. No rashes or lesions on exposed skin. Lymph nodes: Cervical, supraclavicular nodes normal.  Neurologic: Cranial nerves II-XII intact. Strength 5/5 grossly. Sensation and reflexes normal throughout. Psychiatric: Affect blunted. Mood euthymic.  Thoughts logical. Speech volume and speed normal      Signed,    Antoine Reyes MD  6/18/2021

## 2021-06-18 NOTE — PROGRESS NOTES
Chief Complaint   Patient presents with    Diabetes     Follow  up     1. Have you been to the ER, urgent care clinic since your last visit? Hospitalized since your last visit? No    2. Have you seen or consulted any other health care providers outside of the [address] [place] [address] since your last visit? Include any pap smears or colon screening.  No

## 2021-06-22 ENCOUNTER — LAB ONLY (OUTPATIENT)
Dept: FAMILY MEDICINE CLINIC | Age: 36
End: 2021-06-22

## 2021-06-22 DIAGNOSIS — E11.21 TYPE 2 DIABETES WITH NEPHROPATHY (HCC): ICD-10-CM

## 2021-06-23 LAB
ALBUMIN SERPL-MCNC: 3.6 G/DL (ref 3.5–5)
ALBUMIN/GLOB SERPL: 0.9 {RATIO} (ref 1.1–2.2)
ALP SERPL-CCNC: 123 U/L (ref 45–117)
ALT SERPL-CCNC: 22 U/L (ref 12–78)
ANION GAP SERPL CALC-SCNC: 2 MMOL/L (ref 5–15)
AST SERPL-CCNC: 13 U/L (ref 15–37)
BILIRUB SERPL-MCNC: 0.2 MG/DL (ref 0.2–1)
BUN SERPL-MCNC: 12 MG/DL (ref 6–20)
BUN/CREAT SERPL: 24 (ref 12–20)
CALCIUM SERPL-MCNC: 10 MG/DL (ref 8.5–10.1)
CHLORIDE SERPL-SCNC: 107 MMOL/L (ref 97–108)
CO2 SERPL-SCNC: 31 MMOL/L (ref 21–32)
CREAT SERPL-MCNC: 0.5 MG/DL (ref 0.55–1.02)
EST. AVERAGE GLUCOSE BLD GHB EST-MCNC: 148 MG/DL
GLOBULIN SER CALC-MCNC: 4 G/DL (ref 2–4)
GLUCOSE SERPL-MCNC: 122 MG/DL (ref 65–100)
HBA1C MFR BLD: 6.8 % (ref 4–5.6)
POTASSIUM SERPL-SCNC: 4.3 MMOL/L (ref 3.5–5.1)
PROT SERPL-MCNC: 7.6 G/DL (ref 6.4–8.2)
SODIUM SERPL-SCNC: 140 MMOL/L (ref 136–145)

## 2021-06-25 NOTE — PATIENT INSTRUCTIONS
Weight Loss Tips:  Remember this is like a part time job so your motivation and commitment is key to your success. Mindset  Weight loss like any other behavior change starts in your mind. Think hard about what your motivates you to lose weight then meditate on that. Remind yourself of your motivation  with phone alarms, scheduled meditation time, vision board, journal- just to name a few ideas. Have realistic goals. We expect with diligent healthy diet and physical activity you can lose 5% of your body weight in 3  months. Wt in lbs x 0.05 = #lbs you should lose in 3 months. Make food and activity changes with a goal of CONSISTENCY not perfection. Food  Start eating differently. Most of your weight loss and gain is from what you eat. Use small plates only  Drink 2 liters (1/2 gallon) of water every day  HALF of every meal should be fruit or vegetables  Try meal prepping on Sunday (or your day off) with new different vegetables. Consider meal prep service such as Cleaneatz.com, wepremeals. com  Replace soda with diet soda or other zero sugar drinks (selter water just fine)  Consider using the [employer] netta for calorie counting. Goal 8400-3273 calories per day    Activity  Staying physically active will help you lose more weight and can help you get over the plateau when you weight just  won't change any more with diet. Start exercise at least 5 days per week for 40 minutes. Consider [employer] training netta for home exercises. You can start with walking. I suggest walking at a speed of at least 3.5-4.5mph to for the weight loss benefit. Increase your speed or distance every 2 weeks. Do some slow stretching daily of legs, arms and back. Consider adding weight training with light weights at home or at the gym. See a doctor or a physical training for  instructions in order to avoid injuries from doing muscle training incorrectly.   Free fitness program in [place]: [employer] org/program/fitness-warriors/         Learning About Eating More Fruits and Vegetables  What are some quick tips for eating more fruits and vegetables? We're all encouraged to eat more fruits and vegetables. Yet it can seem like one more chore on the daily to-do list. But you can add color and crunch to your meals--and lots of nutrition--with these quick tips. · Brighten up your breakfast.  ? Add sliced fruit or frozen berries to your yogurt, pancakes, or cereal.  ? Blend fresh or frozen fruit, veggies, and yogurt with a little fruit juice, and you've got a tasty smoothie. ? Make your scrambled eggs a gourmet treat by adding onions, celery, and bell peppers. ? Bake up some bran muffins with grated carrots added into the mix. · Make a livelier lunch. ? Jazz up tuna or chicken salad with apple chunks, celery, or grapes--or all of them! ? Add cucumbers, avocado slices, tomatoes, and lettuce to your sandwiches. ? Kick up the flavor of grilled cheese sandwiches with spinach and tomatoes. ? Puree some potatoes or squash to add to tomato soup. · Add delicious veggies to dinner. ? Give more color and taste to salads. Stir in red cabbage, carrots, and bell peppers. Top salads with dried cranberries or raisins. \"Frost\" your salad with orange sections or strawberries. ? Keep a bag or two of frozen vegetables ready to pull out of the freezer for a side dish. ? Spice up spaghetti and meatballs with mushrooms and bell peppers. ? Roast vegetables like cauliflower or squash in the oven with olive oil to bring out their flavor. ? Season your veggie dish with herbs like basil and calvin and a splash of lemon juice and olive oil. ? If you've got a main dish in the oven, stick in a potato to round out your meal.  · Grab some healthy snacks on the go. ? Scoop up an apple, banana, or plum for a quick snack. ? Cut up raw fruits and veggies to keep in your fridge.  Grapes, oranges, carrots, and celery are great choices. They'll be ready for a quick snack or an after-school treat. ? Dip raw vegetables in hummus or peanut butter. ? Keep dried fruit on hand for an easy \"take with you\" snack. · Make something sweet--and healthy. ? Try baked apples or pears topped with cinnamon and honey for a delicious dessert. ? Make chocolate chip cookies even better with grated carrots added to the mix. Where can you learn more? Go to http://www.gray.com/  Enter F050 in the search box to learn more about \"Learning About Eating More Fruits and Vegetables. \"  Current as of: December 17, 2020               Content Version: 12.8  © 2006-2021 [employer]. Care instructions adapted under license by [employer] (which disclaims liability or warranty for this information). If you have questions about a medical condition or this instruction, always ask your healthcare professional. [place] [address] any warranty or liability for your use of this information. DASH Diet: Care Instructions  Your Care Instructions     The DASH diet is an eating plan that can help lower your blood pressure. DASH stands for Dietary Approaches to Stop Hypertension. Hypertension is high blood pressure. The DASH diet focuses on eating foods that are high in calcium, potassium, and magnesium. These nutrients can lower blood pressure. The foods that are highest in these nutrients are fruits, vegetables, low-fat dairy products, nuts, seeds, and legumes. But taking calcium, potassium, and magnesium supplements instead of eating foods that are high in those nutrients does not have the same effect. The DASH diet also includes whole grains, fish, and poultry. The DASH diet is one of several lifestyle changes your doctor may recommend to lower your high blood pressure. Your doctor may also want you to decrease the amount of sodium in your diet.  Lowering sodium while following the DASH diet can lower blood pressure even further than just the DASH diet alone. Follow-up care is a key part of your treatment and safety. Be sure to make and go to all appointments, and call your doctor if you are having problems. It's also a good idea to know your test results and keep a list of the medicines you take. How can you care for yourself at home? Following the DASH diet  · Eat 4 to 5 servings of fruit each day. A serving is 1 medium-sized piece of fruit, ½ cup chopped or canned fruit, 1/4 cup dried fruit, or 4 ounces (½ cup) of fruit juice. Choose fruit more often than fruit juice. · Eat 4 to 5 servings of vegetables each day. A serving is 1 cup of lettuce or raw leafy vegetables, ½ cup of chopped or cooked vegetables, or 4 ounces (½ cup) of vegetable juice. Choose vegetables more often than vegetable juice. · Get 2 to 3 servings of low-fat and fat-free dairy each day. A serving is 8 ounces of milk, 1 cup of yogurt, or 1 ½ ounces of cheese. · Eat 6 to 8 servings of grains each day. A serving is 1 slice of bread, 1 ounce of dry cereal, or ½ cup of cooked rice, pasta, or cooked cereal. Try to choose whole-grain products as much as possible. · Limit lean meat, poultry, and fish to 2 servings each day. A serving is 3 ounces, about the size of a deck of cards. · Eat 4 to 5 servings of nuts, seeds, and legumes (cooked dried beans, lentils, and split peas) each week. A serving is 1/3 cup of nuts, 2 tablespoons of seeds, or ½ cup of cooked beans or peas. · Limit fats and oils to 2 to 3 servings each day. A serving is 1 teaspoon of vegetable oil or 2 tablespoons of salad dressing. · Limit sweets and added sugars to 5 servings or less a week. A serving is 1 tablespoon jelly or jam, ½ cup sorbet, or 1 cup of lemonade. · Eat less than 2,300 milligrams (mg) of sodium a day. If you limit your sodium to 1,500 mg a day, you can lower your blood pressure even more.   · Be aware that all of these are the suggested number of servings for people who eat 1,800 to 2,000 calories a day. Your recommended number of servings may be different if you need more or fewer calories. Tips for success  · Start small. Do not try to make dramatic changes to your diet all at once. You might feel that you are missing out on your favorite foods and then be more likely to not follow the plan. Make small changes, and stick with them. Once those changes become habit, add a few more changes. · Try some of the following:  ? Make it a goal to eat a fruit or vegetable at every meal and at snacks. This will make it easy to get the recommended amount of fruits and vegetables each day. ? Try yogurt topped with fruit and nuts for a snack or healthy dessert. ? Add lettuce, tomato, cucumber, and onion to sandwiches. ? Combine a ready-made pizza crust with low-fat mozzarella cheese and lots of vegetable toppings. Try using tomatoes, squash, spinach, broccoli, carrots, cauliflower, and onions. ? Have a variety of cut-up vegetables with a low-fat dip as an appetizer instead of chips and dip. ? Sprinkle sunflower seeds or chopped almonds over salads. Or try adding chopped walnuts or almonds to cooked vegetables. ? Try some vegetarian meals using beans and peas. Add garbanzo or kidney beans to salads. Make burritos and tacos with mashed rosas beans or black beans. Where can you learn more? Go to http://www.sauceda.com/  Enter H967 in the search box to learn more about \"DASH Diet: Care Instructions. \"  Current as of: August 31, 2020               Content Version: 12.8  © 4759-6821 [employer]. Care instructions adapted under license by [employer] (which disclaims liability or warranty for this information).  If you have questions about a medical condition or this instruction, always ask your healthcare professional. Jocelyn Chin any warranty or liability for your use of this information. Learning About Meal Planning for Diabetes  Why plan your meals? Meal planning can be a key part of managing diabetes. Planning meals and snacks with the right balance of carbohydrate, protein, and fat can help you keep your blood sugar at the target level you set with your doctor. You don't have to eat special foods. You can eat what your family eats, including sweets once in a while. But you do have to pay attention to how often you eat and how much you eat of certain foods. You may want to work with a dietitian or a certified diabetes educator. He or she can give you tips and meal ideas and can answer your questions about meal planning. This health professional can also help you reach a healthy weight if that is one of your goals. What plan is right for you? Your dietitian or diabetes educator may suggest that you start with the plate format or carbohydrate counting. The plate format  The plate format is a simple way to help you manage how you eat. You plan meals by learning how much space each food should take on a plate. Using the plate format helps you spread carbohydrate throughout the day. It can make it easier to keep your blood sugar level within your target range. It also helps you see if you're eating healthy portion sizes. To use the plate format, you put non-starchy vegetables on half your plate. Add meat or meat substitutes on one-quarter of the plate. Put a grain or starchy vegetable (such as brown rice or a potato) on the final quarter of the plate. You can add a small piece of fruit and some low-fat or fat-free milk or yogurt, depending on your carbohydrate goal for each meal.  Here are some tips for using the plate format:  · Make sure that you are not using an oversized plate. A 9-inch plate is best. Many restaurants use larger plates. · Get used to using the plate format at home. Then you can use it when you eat out.   · Write down your questions about using the plate format. Talk to your doctor, a dietitian, or a diabetes educator about your concerns. Carbohydrate counting  With carbohydrate counting, you plan meals based on the amount of carbohydrate in each food. Carbohydrate raises blood sugar higher and more quickly than any other nutrient. It is found in desserts, breads and cereals, and fruit. It's also found in starchy vegetables such as potatoes and corn, grains such as rice and pasta, and milk and yogurt. Spreading carbohydrate throughout the day helps keep your blood sugar levels within your target range. Your daily amount depends on several things, including your weight, how active you are, which diabetes medicines you take, and what your goals are for your blood sugar levels. A registered dietitian or diabetes educator can help you plan how much carbohydrate to include in each meal and snack. A guideline for your daily amount of carbohydrate is:  · 45 to 60 grams at each meal. That's about the same as 3 to 4 carbohydrate servings. · 15 to 20 grams at each snack. That's about the same as 1 carbohydrate serving. The Nutrition Facts label on packaged foods tells you how much carbohydrate is in a serving of the food. First, look at the serving size on the food label. Is that the amount you eat in a serving? All of the nutrition information on a food label is based on that serving size. So if you eat more or less than that, you'll need to adjust the other numbers. Total carbohydrate is the next thing you need to look for on the label. If you count carbohydrate servings, one serving of carbohydrate is 15 grams. For foods that don't come with labels, such as fresh fruits and vegetables, you'll need a guide that lists carbohydrate in these foods. Ask your doctor, dietitian, or diabetes educator about books or other nutrition guides you can use.   If you take insulin, you need to know how many grams of carbohydrate are in a meal. This lets you know how much rapid-acting insulin to take before you eat. If you use an insulin pump, you get a constant rate of insulin during the day. So the pump must be programmed at meals to give you extra insulin to cover the rise in blood sugar after meals. When you know how much carbohydrate you will eat, you can take the right amount of insulin. Or, if you always use the same amount of insulin, you need to make sure that you eat the same amount of carbohydrate at meals. If you need more help to understand carbohydrate counting and food labels, ask your doctor, dietitian, or diabetes educator. How can you plan healthy meals? Here are some tips to get started:  · Plan your meals a week at a time. Don't forget to include snacks too. · Use cookbooks or online recipes to plan several main meals. Plan some quick meals for busy nights. You also can double some recipes that freeze well. Then you can save half for other busy nights when you don't have time to cook. · Make sure you have the ingredients you need for your recipes. If you're running low on basic items, put these items on your shopping list too. · List foods that you use to make breakfasts, lunches, and snacks. List plenty of fruits and vegetables. · Post this list on the refrigerator. Add to it as you think of more things you need. · Take the list to the store to do your weekly shopping. Follow-up care is a key part of your treatment and safety. Be sure to make and go to all appointments, and call your doctor if you are having problems. It's also a good idea to know your test results and keep a list of the medicines you take. Where can you learn more? Go to http://www.gray.com/  Enter H194 in the search box to learn more about \"Learning About Meal Planning for Diabetes. \"  Current as of: August 31, 2020               Content Version: 12.8  © 8873-2773 [employer].    Care instructions adapted under license by [employer] (which disclaims liability or warranty for this information). If you have questions about a medical condition or this instruction, always ask your healthcare professional. [address] any warranty or liability for your use of this information.

## 2021-06-28 NOTE — PROGRESS NOTES
Your A1C is significantly improved. You diabetes is well controlled.  Keep up the great work!! Mychart result comment sent

## 2021-07-29 ENCOUNTER — VIRTUAL VISIT (OUTPATIENT)
Dept: SURGERY | Age: 36
End: 2021-07-29
Payer: MEDICAID

## 2021-07-29 VITALS — WEIGHT: 293 LBS | BODY MASS INDEX: 53.92 KG/M2 | HEIGHT: 62 IN

## 2021-07-29 DIAGNOSIS — K75.81 NASH (NONALCOHOLIC STEATOHEPATITIS): ICD-10-CM

## 2021-07-29 DIAGNOSIS — E11.59 HYPERTENSION ASSOCIATED WITH DIABETES (HCC): ICD-10-CM

## 2021-07-29 DIAGNOSIS — E11.21 TYPE 2 DIABETES WITH NEPHROPATHY (HCC): ICD-10-CM

## 2021-07-29 DIAGNOSIS — K21.9 GASTROESOPHAGEAL REFLUX DISEASE WITHOUT ESOPHAGITIS: ICD-10-CM

## 2021-07-29 DIAGNOSIS — M17.11 PRIMARY OSTEOARTHRITIS OF RIGHT KNEE: ICD-10-CM

## 2021-07-29 DIAGNOSIS — E78.5 HYPERLIPIDEMIA, UNSPECIFIED HYPERLIPIDEMIA TYPE: ICD-10-CM

## 2021-07-29 DIAGNOSIS — E66.01 MORBID OBESITY (HCC): Primary | ICD-10-CM

## 2021-07-29 DIAGNOSIS — I15.2 HYPERTENSION ASSOCIATED WITH DIABETES (HCC): ICD-10-CM

## 2021-07-29 PROCEDURE — 99443 PR PHYS/QHP TELEPHONE EVALUATION 21-30 MIN: CPT | Performed by: SURGERY

## 2021-07-29 NOTE — PATIENT INSTRUCTIONS
Learning About Weight-Loss (Bariatric) Surgery  What is weight-loss surgery? Bariatric surgery is surgery to help you lose weight. This type of surgery is only used for people who are very overweight and have not been able to lose weight with diet and exercise. This surgery makes the stomach smaller. Some types of surgery also change the connection between your stomach and intestines. Having weight-loss surgery is a big step. After surgery, you'll need to make new, lifelong changes in how you eat and drink. How is weight-loss surgery done? Bariatric surgery may be either \"open\" or \"laparoscopic. \" Open surgery is done through a large cut (incision) in the belly. Laparoscopic surgery is done through several small cuts. The doctor puts a lighted tube, or scope, and other surgical tools through small cuts in your belly. The doctor is able to see your organs with the scope. There are different types of bariatric surgery. Gastric sleeve surgery  The surgery is usually done through several small incisions in the belly. The doctor removes more than half of your stomach. This leaves a thin sleeve, or tube, that is about the size of a banana. Because part of your stomach has been removed, this can't be reversed. Demi-en-Y gastric bypass surgery  Demi-en-Y (say \"edgar-en-why\") surgery changes the connection between the stomach and the intestines. The doctor separates a section of your stomach from the rest of your stomach. This makes a small pouch. The new pouch will hold the food you eat. The doctor connects the stomach pouch to the middle part of the small intestine. Gastric banding surgery  The surgery is usually done through several small incisions in the belly. The doctor wraps a band around the upper part of the stomach. This creates a small pouch. The small size of the pouch means that you will get full after you eat just a small amount of food.  The doctor can inflate or deflate the band to adjust the size. This lets the doctor adjust how quickly food passes from the new pouch into the stomach. It does not change the connection between the stomach and the intestines. What can you expect after the surgery? You may stay in the hospital for one or more days after the surgery. How long you stay depends on the type of surgery you had. Most people need 2 to 4 weeks before they are ready to get back to their usual routine. For the first 2 to 6 weeks after surgery, you probably will need to follow a liquid or soft diet. Bit by bit, you will be able to eat more solid foods. Your doctor may advise you to work with a dietitian. This way you'll be sure to get enough protein, vitamins, and minerals while you are losing weight. Even with a healthy diet, you may need to take vitamin and mineral supplements. After surgery, you will not be able to eat very much at one time. You will get full quickly. Try not to eat too much at one time or eat foods that are high in fat or sugar. If you do, you may vomit, get stomach pain, or have diarrhea. You probably will lose weight very quickly in the first few months after surgery. As time goes on, your weight loss will slow down. You will have regular doctor visits to check how you are doing. Think of bariatric surgery as a tool to help you lose weight. It isn't an instant fix. You will still need to eat a healthy diet and get regular exercise. This will help you reach your weight goal and avoid regaining the weight you lose. Follow-up care is a key part of your treatment and safety. Be sure to make and go to all appointments, and call your doctor if you are having problems. It's also a good idea to know your test results and keep a list of the medicines you take. Where can you learn more? Go to http://www.gray.com/  Enter G469 in the search box to learn more about \"Learning About Weight-Loss (Bariatric) Surgery. \"  Current as of: September 23, 2020               Content Version: 12.8  © 0704-9892 [employer], Incorporated. Care instructions adapted under license by [employer] (which disclaims liability or warranty for this information). If you have questions about a medical condition or this instruction, always ask your healthcare professional. [address] any warranty or liability for your use of this information.

## 2021-07-29 NOTE — PROGRESS NOTES
I was in the office while conducting this encounter. Consent:  She and/or her healthcare decision maker is aware that this patient-initiated Telehealth encounter is a billable service, with coverage as determined by her insurance carrier. She is aware that she may receive a bill and has provided verbal consent to proceed: Yes    This virtual visit was conducted via [employer]. Pursuant to the emergency declaration under the [facility] [place] [address] waiver authority and the [employer] Act, this Virtual  Visit was conducted to reduce the patient's risk of exposure to COVID-19 and provide continuity of care for an established patient. Services were provided through a video synchronous discussion virtually to substitute for in-person clinic visit. Due to this being a TeleHealth evaluation, many elements of the physical examination are unable to be assessed. Total Time: minutes: 21-30 minutes. Bariatric Surgery Consultation    Subjective: The patient is a 37 y.o. obese  female with a Body mass index is 59.08 kg/m². .  The patient has been at her heaviest weight for the past 2 years;  she has been overweight since age 14;  she has been considering surgery since 2020;  she desires surgery at this time because diabetes has become harder to control. Alphonso Zamudio has tried multiple diets in her lifetime most recently tried unsupervised diets.     Bariatric comorbidities present are   Patient Active Problem List   Diagnosis Code    Gallstones K80.20    Hypertension associated with diabetes ([address] E11.59, I15.2    Diabetes mellitus type II, uncontrolled ([address] E11.65    Hyperlipidemia E78.5    Obesity, morbid (HCC) E66.01    CH (nonalcoholic steatohepatitis) K75.81    Elevated TSH R79.89    Type 2 diabetes with nephropathy (HCC) E11.21    Primary osteoarthritis of right knee M17.11     The patient desires laparoscopic gastric bypass surgery for surgical weight loss. The patients goal weight is 200 lbs. The highest acceptable weight is 250 lbs. These goals are consistent with expected outcomes of their desired operation. her Medical goals are diabetes resolution;  her qualty of life goals are decreased fatigue. Patient Active Problem List    Diagnosis Date Noted    Primary osteoarthritis of right knee 06/25/2019    Type 2 diabetes with nephropathy ([facility] 01/16/2019    Elevated TSH 09/08/2017    CH (nonalcoholic steatohepatitis) 01/13/2016    Hyperlipidemia 06/24/2014    Obesity, morbid ([address] 06/24/2014    Diabetes mellitus type II, uncontrolled ([facility] 03/11/2014    Hypertension associated with diabetes ([facility]     Gallstones       Past Surgical History:   Procedure Laterality Date    HX CHOLECYSTECTOMY  7/2011      Social History     Tobacco Use    Smoking status: Never Smoker    Smokeless tobacco: Never Used   Substance Use Topics    Alcohol use: Yes     Alcohol/week: 0.0 standard drinks     Comment: occasional etoh      Family History   Problem Relation Age of Onset    High Cholesterol Mother     Diabetes Father     Heart Failure Father     Hypertension Father     Hypertension Maternal Grandmother     Emphysema Maternal Grandfather     Diabetes Maternal Grandfather     Hypertension Maternal Grandfather     Cancer Maternal Grandfather         prostate cancer    Heart Disease Maternal Aunt       Prior to Admission medications    Medication Sig Start Date End Date Taking?  Authorizing Provider   famotidine (PEPCID) 20 mg tablet TAKE 1 TABLET BY MOUTH TWICE A DAY 5/26/21  Yes Provider, Historical   BD Insulin Syringe Ultra-Fine 1 mL 30 gauge x 1/2\" syrg USE DAILY AS DIRECTED 6/1/21  Yes Provider, Historical   glucose blood VI test strips (blood glucose test) strip Any brand, for uncontrolled DM E11.21 check up to 3x daily 2/22/21  Yes Vani Dior MD   lisinopriL ([address] [place] [address], ZESTRIL) 5 mg tablet Take 1 Tab by mouth daily. 2/9/21  Yes Jari Castleman, MD   hydroCHLOROthiazide (MICROZIDE) 12.5 mg capsule Take 1 Cap by mouth daily. 2/9/21  Yes Jari Castleman, MD   sertraline (ZOLOFT) 50 mg tablet Take 1 Tab by mouth daily. 2/9/21  Yes Jari Castleman, MD   insulin glargine (LANTUS) 100 unit/mL injection 15 Units by SubCUTAneous route nightly. 2/9/21  Yes Jari Castleman, MD   Insulin Needles, Disposable, (BD Delia 2nd Gen Pen Needle) 32 gauge x 5/32\" ndle Use daily as directed 1/27/21  Yes Jari Castleman, MD   glipiZIDE (GLUCOTROL) 10 mg tablet TAKE 1 TABLET BY MOUTH TWICE A DAY 1/15/21  Yes Jari Castleman, MD   liraglutide (VICTOZA) 0.6 mg/0.1 mL (18 mg/3 mL) pnij Inject 0.6 mg once daily for 1 week, then increase to 1.2 mg once daily 1/11/21  Yes Jari Castleman, MD   metFORMIN ER (GLUCOPHAGE XR) 500 mg tablet TAKE 2 TABLETS BY MOUTH TWICE A DAY. 10/19/20  Yes Jari Castleman, MD   atorvastatin (LIPITOR) 40 mg tablet TAKE 1 TABLET BY MOUTH EVERY DAY 9/18/20  Yes Jari Castleman, MD   Blood-Glucose Meter monitoring kit Fill with strips and lancets for bid testing. Uncontrolled type 2 DM E11.21 3/30/20  Yes Jari Castleman, MD   alcohol swabs (Alcohol Pads) padm Use to clear skin before SC injections  Patient not taking: Reported on 7/29/2021 2/9/21   Jari Castleman, MD     Allergies   Allergen Reactions    Pcn [Penicillins] Unable to Obtain     \"Childhood allergy\"  Ancef 2g given pre-op with no reaction noted to test dose. Review of Systems:    Review of Systems   Constitutional: Negative for chills, fever and weight loss. HENT: Negative. Eyes: Negative. Respiratory: Positive for shortness of breath. Negative for cough. Cardiovascular: Positive for orthopnea. Negative for chest pain and palpitations. Gastrointestinal: Positive for heartburn. Negative for abdominal pain, constipation, nausea and vomiting.    Genitourinary: Negative for dysuria and frequency. Musculoskeletal: Positive for back pain, joint pain and neck pain. Skin: Negative. Neurological: Negative. Endo/Heme/Allergies: Negative. Psychiatric/Behavioral: Negative. Objective:     Visit Vitals  Ht 5' 2\" (1.575 m)   Wt 323 lb (146.5 kg)   BMI 59.08 kg/m²       Physical Exam:  GENERAL: alert, cooperative, no distress, appears older than stated age, morbidly obese    Assessment:     Morbid obesity with comorbidities; no success with medical management. Plan:     laparoscopic gastric bypass surgery    This is a 37 y.o. female with a BMI of Body mass index is 59.08 kg/m². and the weight-related co-morbidities listed above. Harshil meets the NIH criteria for bariatric surgery based upon the BMI of Body mass index is 59.08 kg/m². and multiple weight-related co-morbidities. Yudelka Goodson has elected laparoscopic keli-en-Y gastric bypass as her intervention of choice for treatment of morbid obesity through surgical means secondary to its long term history of success. In the office today, following Harshil's history and physical examination, a 30 minute discussion regarding the anatomic alterations for the laparoscopic keli-en-Y gastric bypass was undertaken. The dietary expectations and the patient and physician dependent factors for success were thoroughly discussed, to include the need for interval follow-up and long-term dietary changes associated with success. The possible complications of the keli-en-Y gastric bypass  were also discussed, to include VTE, staple line leak, bleeding, stricture, infection, conversion to open procedure, ulcer, GERD symptoms, poor weight loss/weight regain, poor comorbidity resolution, internal hernia and pouch dilation. Specific weight related outcomes for success were also discussed with an emphasis on careful and close follow-up with the first year.   The patient expressed an understanding of the above factors, and her questions were answered in their entirety. In addition, the patient attended a 1.5 hour power point seminar regarding obesity, surgical weight loss including, adjustable gastric band, gastric bypass, and sleeve gastrectomy. This discussion contrasted the different surgical techniques, mechanisms of actions and expected outcomes, and surgical and medical risks associated with each procedure. During this seminar, there was a long question and answer session where each questions was answered until there were no additional questions. Today, the patient had all of her questions answered and desires to proceed with pre-qualification for bariatric surgery initially choosing keli-en-Y gastric bypass as her surgical option. She will schedule Psychology evaluation and initiate 6-month supervised medical weight loss program. We will order Dietitian evaluation, upper GI series given GERD symptoms, and gastric emptying study to assess for gastroparesis.       Signed By: Karina Lowe MD     July 29, 2021

## 2021-08-18 NOTE — TELEPHONE ENCOUNTER
Please contact patient for scheduling. Thanks      Historical medication: Pepcid 20 mg     Last visit 06/18/2021 MD Reena Marshall   Next appointment 3 months (09/2021) - Nothing scheduled     Requested Prescriptions     Pending Prescriptions Disp Refills    famotidine (PEPCID) 20 mg tablet 60 Tablet 0     Sig: Take 1 Tablet by mouth two (2) times a day.

## 2021-08-20 RX ORDER — FAMOTIDINE 20 MG/1
20 TABLET, FILM COATED ORAL 2 TIMES DAILY
Qty: 60 TABLET | Refills: 0 | Status: SHIPPED | OUTPATIENT
Start: 2021-08-20 | End: 2021-12-09 | Stop reason: SDUPTHER

## 2021-10-06 ENCOUNTER — TELEPHONE (OUTPATIENT)
Dept: FAMILY MEDICINE CLINIC | Age: 36
End: 2021-10-06

## 2021-10-06 ENCOUNTER — OFFICE VISIT (OUTPATIENT)
Dept: FAMILY MEDICINE CLINIC | Age: 36
End: 2021-10-06
Payer: MEDICAID

## 2021-10-06 VITALS
SYSTOLIC BLOOD PRESSURE: 123 MMHG | RESPIRATION RATE: 16 BRPM | OXYGEN SATURATION: 98 % | TEMPERATURE: 98.1 F | HEIGHT: 62 IN | WEIGHT: 293 LBS | BODY MASS INDEX: 53.92 KG/M2 | HEART RATE: 90 BPM | DIASTOLIC BLOOD PRESSURE: 85 MMHG

## 2021-10-06 DIAGNOSIS — E55.9 VITAMIN D DEFICIENCY: ICD-10-CM

## 2021-10-06 DIAGNOSIS — E11.21 TYPE 2 DIABETES WITH NEPHROPATHY (HCC): Primary | ICD-10-CM

## 2021-10-06 DIAGNOSIS — I10 HYPERTENSION, UNSPECIFIED TYPE: ICD-10-CM

## 2021-10-06 DIAGNOSIS — E66.01 OBESITY, MORBID (HCC): ICD-10-CM

## 2021-10-06 DIAGNOSIS — E66.01 MORBID OBESITY WITH BMI OF 50.0-59.9, ADULT (HCC): ICD-10-CM

## 2021-10-06 LAB
25(OH)D3 SERPL-MCNC: <9 NG/ML (ref 30–100)
ALBUMIN SERPL-MCNC: 3.2 G/DL (ref 3.5–5)
ALBUMIN/GLOB SERPL: 0.8 {RATIO} (ref 1.1–2.2)
ALP SERPL-CCNC: 95 U/L (ref 45–117)
ALT SERPL-CCNC: 16 U/L (ref 12–78)
ANION GAP SERPL CALC-SCNC: 8 MMOL/L (ref 5–15)
AST SERPL-CCNC: 13 U/L (ref 15–37)
BILIRUB SERPL-MCNC: 0.5 MG/DL (ref 0.2–1)
BUN SERPL-MCNC: 6 MG/DL (ref 6–20)
BUN/CREAT SERPL: 11 (ref 12–20)
CALCIUM SERPL-MCNC: 9.7 MG/DL (ref 8.5–10.1)
CHLORIDE SERPL-SCNC: 101 MMOL/L (ref 97–108)
CHOLEST SERPL-MCNC: 170 MG/DL
CO2 SERPL-SCNC: 26 MMOL/L (ref 21–32)
CREAT SERPL-MCNC: 0.54 MG/DL (ref 0.55–1.02)
CREAT UR-MCNC: 167 MG/DL
ERYTHROCYTE [DISTWIDTH] IN BLOOD BY AUTOMATED COUNT: 14.6 % (ref 11.5–14.5)
EST. AVERAGE GLUCOSE BLD GHB EST-MCNC: 192 MG/DL
GLOBULIN SER CALC-MCNC: 4 G/DL (ref 2–4)
GLUCOSE SERPL-MCNC: 238 MG/DL (ref 65–100)
HBA1C MFR BLD: 8.3 % (ref 4–5.6)
HCT VFR BLD AUTO: 36.3 % (ref 35–47)
HDLC SERPL-MCNC: 45 MG/DL
HDLC SERPL: 3.8 {RATIO} (ref 0–5)
HGB BLD-MCNC: 11.4 G/DL (ref 11.5–16)
LDLC SERPL CALC-MCNC: 45.4 MG/DL (ref 0–100)
MCH RBC QN AUTO: 27 PG (ref 26–34)
MCHC RBC AUTO-ENTMCNC: 31.4 G/DL (ref 30–36.5)
MCV RBC AUTO: 85.8 FL (ref 80–99)
MICROALBUMIN UR-MCNC: 3.65 MG/DL
MICROALBUMIN/CREAT UR-RTO: 22 MG/G (ref 0–30)
NRBC # BLD: 0 K/UL (ref 0–0.01)
NRBC BLD-RTO: 0 PER 100 WBC
PLATELET # BLD AUTO: 303 K/UL (ref 150–400)
PMV BLD AUTO: 11.6 FL (ref 8.9–12.9)
POTASSIUM SERPL-SCNC: 4.1 MMOL/L (ref 3.5–5.1)
PROT SERPL-MCNC: 7.2 G/DL (ref 6.4–8.2)
RBC # BLD AUTO: 4.23 M/UL (ref 3.8–5.2)
SODIUM SERPL-SCNC: 135 MMOL/L (ref 136–145)
TRIGL SERPL-MCNC: 398 MG/DL (ref ?–150)
VLDLC SERPL CALC-MCNC: 79.6 MG/DL
WBC # BLD AUTO: 8.6 K/UL (ref 3.6–11)

## 2021-10-06 PROCEDURE — 99214 OFFICE O/P EST MOD 30 MIN: CPT | Performed by: NURSE PRACTITIONER

## 2021-10-06 RX ORDER — ERGOCALCIFEROL 1.25 MG/1
50000 CAPSULE ORAL
Qty: 12 CAPSULE | Refills: 0 | Status: SHIPPED | OUTPATIENT
Start: 2021-10-06 | End: 2021-12-27

## 2021-10-06 RX ORDER — INSULIN GLARGINE 100 [IU]/ML
15 INJECTION, SOLUTION SUBCUTANEOUS
Qty: 1 ML | Refills: 2 | Status: SHIPPED | OUTPATIENT
Start: 2021-10-06 | End: 2022-05-27

## 2021-10-06 NOTE — TELEPHONE ENCOUNTER
Called, spoke to pt. Two pt identifiers confirmed. Writer informed patient of abnormal lab and new med order and appointment made.

## 2021-10-06 NOTE — PROGRESS NOTES
[facility] Note  Subjective:      Di Johnson is a 37 y.o. female who presents for follow up on chronic problems, she has history of hypertension,diabetes  hyperlipidemia,and morbid obesity. Taking medications as prescribed, no medication side effects reported. Past Medical History:   Diagnosis Date    Gallstones     History of chickenpox 6/11/2010    Hyperlipidemia 6/24/2014    Hypertension     Not on any meds    Morbid obesity ([address]     CH (nonalcoholic steatohepatitis) 1/13/2016     Past Surgical History:   Procedure Laterality Date    HX CHOLECYSTECTOMY  7/2011       Current Outpatient Medications   Medication Sig Dispense Refill    insulin glargine (LANTUS) 100 unit/mL injection 15 Units by SubCUTAneous route nightly. 1 mL 2    liraglutide (VICTOZA) 0.6 mg/0.1 mL (18 mg/3 mL) pnij Inject 0.6 mg once daily for 1 week, then increase to 1.2 mg once daily 4 Adjustable Dose Pre-filled Pen Syringe 5    famotidine (PEPCID) 20 mg tablet Take 1 Tablet by mouth two (2) times a day. 60 Tablet 0    BD Insulin Syringe Ultra-Fine 1 mL 30 gauge x 1/2\" syrg USE DAILY AS DIRECTED      glucose blood VI test strips (blood glucose test) strip Any brand, for uncontrolled DM E11.21 check up to 3x daily 300 Strip 0    lisinopriL (PRINIVIL, ZESTRIL) 5 mg tablet Take 1 Tab by mouth daily. 90 Tab 1    hydroCHLOROthiazide (MICROZIDE) 12.5 mg capsule Take 1 Cap by mouth daily. 90 Cap 1    sertraline (ZOLOFT) 50 mg tablet Take 1 Tab by mouth daily. 90 Tab 1    alcohol swabs (Alcohol Pads) padm Use to clear skin before SC injections 200 Pad 2    Insulin Needles, Disposable, (BD Delia 2nd Gen Pen Needle) 32 gauge x 5/32\" ndle Use daily as directed 100 Pen Needle 3    glipiZIDE (GLUCOTROL) 10 mg tablet TAKE 1 TABLET BY MOUTH TWICE A  Tab 3    metFORMIN ER (GLUCOPHAGE XR) 500 mg tablet TAKE 2 TABLETS BY MOUTH TWICE A DAY.  360 Tab 3    atorvastatin (LIPITOR) 40 mg tablet TAKE 1 TABLET BY MOUTH EVERY DAY 90 Tab 3    Blood-Glucose Meter monitoring kit Fill with strips and lancets for bid testing. Uncontrolled type 2 DM E11.21 1 Kit 0     Allergies   Allergen Reactions    Pcn [Penicillins] Unable to Obtain     \"Childhood allergy\"  Ancef 2g given pre-op with no reaction noted to test dose. ROS:   Complete review of systems was reviewed with pertinent information listed in HPI. Review of Systems   Constitutional: Negative. HENT: Negative. Cardiovascular: Negative. Gastrointestinal: Negative. Genitourinary: Negative. Objective:     Visit Vitals  /85 (BP 1 Location: Left upper arm, BP Patient Position: Sitting, BP Cuff Size: Large adult)   Pulse 90   Temp 98.1 °F (36.7 °C) (Temporal)   Resp 16   Ht 5' 2\" (1.575 m)   Wt 341 lb 3.2 oz (154.8 kg)   SpO2 98%   BMI 62.41 kg/m²       Vitals and Nurse Documentation reviewed. Physical Exam  Constitutional:       Appearance: Normal appearance. She is obese. HENT:      Mouth/Throat:      Mouth: Mucous membranes are moist.   Cardiovascular:      Rate and Rhythm: Normal rate and regular rhythm. Pulses: Normal pulses. Heart sounds: Normal heart sounds. No murmur heard. Pulmonary:      Breath sounds: Normal breath sounds. Abdominal:      General: Bowel sounds are normal.      Palpations: Abdomen is soft. Musculoskeletal:      Cervical back: Normal range of motion and neck supple. Neurological:      Mental Status: She is alert. Psychiatric:         Mood and Affect: Mood normal.         Thought Content: Thought content normal.         Assessment/Plan:     Diagnoses and all orders for this visit:    1. Type 2 diabetes with nephropathy (HCC)  -     METABOLIC PANEL, COMPREHENSIVE; Future  -     HEMOGLOBIN A1C WITH EAG; Future  -     MICROALBUMIN, UR, RAND W/ MICROALB/CREAT RATIO; Future  -     insulin glargine (LANTUS) 100 unit/mL injection; 15 Units by SubCUTAneous route nightly.   - liraglutide (VICTOZA) 0.6 mg/0.1 mL (18 mg/3 mL) pnij; Inject 0.6 mg once daily for 1 week, then increase to 1.2 mg once daily    2. Hypertension, unspecified type  -     LIPID PANEL; Future  -     CBC W/O DIFF; Future    3. Morbid obesity with BMI of 50.0-59.9, adult (HCC)  Diet and exercise     4.  Vitamin D deficiency  -     VITAMIN D, 25 HYDROXY; Future    Await labs          Pt expressed understanding with the diagnosis and plan        Discussed expected course/resolution/complications of diagnosis in detail with patient.    Medication risks/benefits/costs/interactions/alternatives discussed with patient.    Pt was given an after visit summary which includes diagnoses, current medications & vitals.  Pt expressed understanding with the diagnosis and plan

## 2021-10-06 NOTE — PROGRESS NOTES
Chief Complaint   Patient presents with    Diabetes     f/u       1. \"Have you been to the ER, urgent care clinic since your last visit? Hospitalized since your last visit? \" No    2. \"Have you seen or consulted any other health care providers outside of the [address] [place] [address] since your last visit? \" No     3. For patients over 45: Has the patient had a colonoscopy? N/A  If the patient is female:    4. For patients over 40: Has the patient had a mammogram? N/A  5. For patients over 21: Has the patient had a pap smear?  No  3 most recent PHQ Screens 10/6/2021   Little interest or pleasure in doing things Not at all   Feeling down, depressed, irritable, or hopeless Not at all   Total Score PHQ 2 0   Trouble falling or staying asleep, or sleeping too much -   Feeling tired or having little energy -   Poor appetite, weight loss, or overeating -   Feeling bad about yourself - or that you are a failure or have let yourself or your family down -   Trouble concentrating on things such as school, work, reading, or watching TV -   Moving or speaking so slowly that other people could have noticed; or the opposite being so fidgety that others notice -   Thoughts of being better off dead, or hurting yourself in some way -   PHQ 9 Score -   How difficult have these problems made it for you to do your work, take care of your home and get along with others -

## 2021-10-08 ENCOUNTER — TELEPHONE (OUTPATIENT)
Dept: FAMILY MEDICINE CLINIC | Age: 36
End: 2021-10-08

## 2021-12-03 DIAGNOSIS — F41.8 DEPRESSION WITH ANXIETY: ICD-10-CM

## 2021-12-03 DIAGNOSIS — F33.0 MAJOR DEPRESSIVE DISORDER, RECURRENT EPISODE, MILD (HCC): ICD-10-CM

## 2021-12-03 NOTE — TELEPHONE ENCOUNTER
----- Message from Socorro Howell sent at 12/3/2021 10:47 AM EST -----  Subject: Message to Provider    QUESTIONS  Information for Provider? Pt of Dr. Ramy Hernández, Blood pressure high does   she need medication please call pt @ [phone]  ---------------------------------------------------------------------------  --------------  CALL BACK INFO  What is the best way for the office to contact you? OK to leave message on   voicemail  Preferred Call Back Phone Number? [phone]  ---------------------------------------------------------------------------  --------------  SCRIPT ANSWERS  Relationship to Patient?  Self

## 2021-12-09 RX ORDER — FAMOTIDINE 20 MG/1
20 TABLET, FILM COATED ORAL 2 TIMES DAILY
Qty: 60 TABLET | Refills: 0 | Status: SHIPPED | OUTPATIENT
Start: 2021-12-09 | End: 2022-01-07 | Stop reason: SDUPTHER

## 2021-12-09 RX ORDER — SERTRALINE HYDROCHLORIDE 50 MG/1
50 TABLET, FILM COATED ORAL DAILY
Qty: 90 TABLET | Refills: 1 | Status: SHIPPED | OUTPATIENT
Start: 2021-12-09

## 2021-12-09 NOTE — TELEPHONE ENCOUNTER
PCP: Lynsey Davila MD    Last appt: 10/6/2021  Future Appointments   Date Time Provider Gabino Reynolds   1/7/2022 10:15 AM Gisela Matthews NP [facility] BS AMB       Requested Prescriptions     Pending Prescriptions Disp Refills    famotidine (PEPCID) 20 mg tablet 60 Tablet 0     Sig: Take 1 Tablet by mouth two (2) times a day.  sertraline (ZOLOFT) 50 mg tablet 90 Tablet 1     Sig: Take 1 Tablet by mouth daily. Prior labs and Blood pressures:  BP Readings from Last 3 Encounters:   10/06/21 123/85   06/18/21 121/77   03/12/21 133/79     Lab Results   Component Value Date/Time    Sodium 135 (L) 10/06/2021 10:46 AM    Potassium 4.1 10/06/2021 10:46 AM    Chloride 101 10/06/2021 10:46 AM    CO2 26 10/06/2021 10:46 AM    Anion gap 8 10/06/2021 10:46 AM    Glucose 238 (H) 10/06/2021 10:46 AM    BUN 6 10/06/2021 10:46 AM    Creatinine 0.54 (L) 10/06/2021 10:46 AM    BUN/Creatinine ratio 11 (L) 10/06/2021 10:46 AM    GFR est AA >60 10/06/2021 10:46 AM    GFR est non-AA >60 10/06/2021 10:46 AM    Calcium 9.7 10/06/2021 10:46 AM     Lab Results   Component Value Date/Time    Hemoglobin A1c 8.3 (H) 10/06/2021 10:46 AM    Hemoglobin A1c (POC) 12.0 09/16/2020 11:17 AM     Lab Results   Component Value Date/Time    Cholesterol, total 170 10/06/2021 10:46 AM    HDL Cholesterol 45 10/06/2021 10:46 AM    LDL, calculated 45.4 10/06/2021 10:46 AM    VLDL, calculated 79.6 10/06/2021 10:46 AM    Triglyceride 398 (H) 10/06/2021 10:46 AM    CHOL/HDL Ratio 3.8 10/06/2021 10:46 AM     Lab Results   Component Value Date/Time    Vitamin D 25-Hydroxy <9.0 (L) 10/06/2021 10:46 AM       Lab Results   Component Value Date/Time    TSH 1.750 09/16/2020 12:09 PM    TSH 1.470 01/09/2019 06:10 PM       ----- Message from Salena Mei sent at 12/3/2021 10:43 AM EST -----  Subject: Refill Request    QUESTIONS  Name of Medication? famotidine (PEPCID) 20 mg tablet  Patient-reported dosage and instructions?  2x day  How many days do you have left? 0  Preferred Pharmacy? [id number]  Pharmacy phone number (if available)? [phone]  ---------------------------------------------------------------------------  --------------,  Name of Medication? sertraline (ZOLOFT) 50 mg tablet  Patient-reported dosage and instructions? 1x day  How many days do you have left? 0  Preferred Pharmacy? [id number]  Pharmacy phone number (if available)? [phone]  Additional Information for Provider? Please refill  ---------------------------------------------------------------------------  --------------  CALL BACK INFO  What is the best way for the office to contact you? OK to leave message on   voicemail  Preferred Call Back Phone Number?  [phone]

## 2021-12-13 DIAGNOSIS — E11.65 UNCONTROLLED TYPE 2 DIABETES MELLITUS WITH HYPERGLYCEMIA (HCC): ICD-10-CM

## 2021-12-13 NOTE — TELEPHONE ENCOUNTER
Last visit 10/06/2021 NP [address]   Next appointment 01/07/2022 MAGGIE Diez   Previous refill encounter(s)   10/19/2020 [facility] with 3 refills     Requested Prescriptions     Pending Prescriptions Disp Refills    metFORMIN ER (GLUCOPHAGE XR) 500 mg tablet 360 Tablet 0     Sig: Take 2 Tablets by mouth two (2) times a day.

## 2021-12-14 RX ORDER — METFORMIN HYDROCHLORIDE 500 MG/1
1000 TABLET, EXTENDED RELEASE ORAL 2 TIMES DAILY
Qty: 360 TABLET | Refills: 0 | Status: SHIPPED | OUTPATIENT
Start: 2021-12-14 | End: 2022-05-27

## 2021-12-27 RX ORDER — ERGOCALCIFEROL 1.25 MG/1
50000 CAPSULE ORAL
Qty: 12 CAPSULE | Refills: 0 | Status: SHIPPED | OUTPATIENT
Start: 2021-12-27 | End: 2022-03-15

## 2021-12-28 DIAGNOSIS — E78.5 HYPERLIPIDEMIA, UNSPECIFIED HYPERLIPIDEMIA TYPE: ICD-10-CM

## 2021-12-28 DIAGNOSIS — E11.59 HYPERTENSION ASSOCIATED WITH DIABETES (HCC): ICD-10-CM

## 2021-12-28 DIAGNOSIS — E11.21 TYPE 2 DIABETES WITH NEPHROPATHY (HCC): ICD-10-CM

## 2021-12-28 DIAGNOSIS — I15.2 HYPERTENSION ASSOCIATED WITH DIABETES (HCC): ICD-10-CM

## 2021-12-28 RX ORDER — ATORVASTATIN CALCIUM 40 MG/1
TABLET, FILM COATED ORAL
Qty: 90 TABLET | Refills: 3 | Status: SHIPPED | OUTPATIENT
Start: 2021-12-28

## 2021-12-28 NOTE — TELEPHONE ENCOUNTER
Last Visit: 10/6/21 NP [address]  Next Appointment: 1/7/22 MAGGIE Diez  Previous Refill Encounter(s): 9/18/20 90 + 3    Requested Prescriptions     Pending Prescriptions Disp Refills    atorvastatin (LIPITOR) 40 mg tablet 90 Tablet 3     Sig: TAKE 1 TABLET BY MOUTH EVERY DAY

## 2022-01-07 RX ORDER — FAMOTIDINE 20 MG/1
20 TABLET, FILM COATED ORAL 2 TIMES DAILY
Qty: 60 TABLET | Refills: 0 | Status: SHIPPED | OUTPATIENT
Start: 2022-01-07 | End: 2022-03-22

## 2022-01-14 ENCOUNTER — OFFICE VISIT (OUTPATIENT)
Dept: FAMILY MEDICINE CLINIC | Age: 37
End: 2022-01-14
Payer: MEDICAID

## 2022-01-14 ENCOUNTER — TELEPHONE (OUTPATIENT)
Dept: FAMILY MEDICINE CLINIC | Age: 37
End: 2022-01-14

## 2022-01-14 VITALS
OXYGEN SATURATION: 95 % | DIASTOLIC BLOOD PRESSURE: 78 MMHG | BODY MASS INDEX: 53.92 KG/M2 | SYSTOLIC BLOOD PRESSURE: 120 MMHG | HEART RATE: 85 BPM | RESPIRATION RATE: 16 BRPM | WEIGHT: 293 LBS | TEMPERATURE: 98.3 F | HEIGHT: 62 IN

## 2022-01-14 DIAGNOSIS — E11.21 TYPE 2 DIABETES WITH NEPHROPATHY (HCC): ICD-10-CM

## 2022-01-14 DIAGNOSIS — F33.0 MAJOR DEPRESSIVE DISORDER, RECURRENT EPISODE, MILD (HCC): ICD-10-CM

## 2022-01-14 DIAGNOSIS — E11.59 HYPERTENSION ASSOCIATED WITH DIABETES (HCC): ICD-10-CM

## 2022-01-14 DIAGNOSIS — E11.65 UNCONTROLLED TYPE 2 DIABETES MELLITUS WITH HYPERGLYCEMIA (HCC): ICD-10-CM

## 2022-01-14 DIAGNOSIS — L02.92 BOIL: ICD-10-CM

## 2022-01-14 DIAGNOSIS — E11.65 UNCONTROLLED TYPE 2 DIABETES MELLITUS WITH HYPERGLYCEMIA (HCC): Primary | ICD-10-CM

## 2022-01-14 DIAGNOSIS — N91.2 AMENORRHEA: ICD-10-CM

## 2022-01-14 DIAGNOSIS — I15.2 HYPERTENSION ASSOCIATED WITH DIABETES (HCC): ICD-10-CM

## 2022-01-14 DIAGNOSIS — E78.5 HYPERLIPIDEMIA, UNSPECIFIED HYPERLIPIDEMIA TYPE: Primary | ICD-10-CM

## 2022-01-14 LAB
ALBUMIN SERPL-MCNC: 3.1 G/DL (ref 3.5–5)
ALBUMIN/GLOB SERPL: 0.8 {RATIO} (ref 1.1–2.2)
ALP SERPL-CCNC: 131 U/L (ref 45–117)
ALT SERPL-CCNC: 21 U/L (ref 12–78)
ANION GAP SERPL CALC-SCNC: 3 MMOL/L (ref 5–15)
AST SERPL-CCNC: 11 U/L (ref 15–37)
BILIRUB SERPL-MCNC: 0.2 MG/DL (ref 0.2–1)
BUN SERPL-MCNC: 9 MG/DL (ref 6–20)
BUN/CREAT SERPL: 16 (ref 12–20)
CALCIUM SERPL-MCNC: 9.9 MG/DL (ref 8.5–10.1)
CHLORIDE SERPL-SCNC: 103 MMOL/L (ref 97–108)
CHOLEST SERPL-MCNC: 136 MG/DL
CO2 SERPL-SCNC: 31 MMOL/L (ref 21–32)
COMMENT, HOLDF: NORMAL
CREAT SERPL-MCNC: 0.58 MG/DL (ref 0.55–1.02)
EST. AVERAGE GLUCOSE BLD GHB EST-MCNC: 249 MG/DL
GLOBULIN SER CALC-MCNC: 4 G/DL (ref 2–4)
GLUCOSE SERPL-MCNC: 267 MG/DL (ref 65–100)
HBA1C MFR BLD: 10.3 % (ref 4–5.6)
HCG SERPL QL: NEGATIVE
HDLC SERPL-MCNC: 39 MG/DL
HDLC SERPL: 3.5 {RATIO} (ref 0–5)
LDLC SERPL CALC-MCNC: 52.2 MG/DL (ref 0–100)
POTASSIUM SERPL-SCNC: 4.4 MMOL/L (ref 3.5–5.1)
PROT SERPL-MCNC: 7.1 G/DL (ref 6.4–8.2)
SAMPLES BEING HELD,HOLD: NORMAL
SODIUM SERPL-SCNC: 137 MMOL/L (ref 136–145)
TRIGL SERPL-MCNC: 224 MG/DL (ref ?–150)
VLDLC SERPL CALC-MCNC: 44.8 MG/DL

## 2022-01-14 PROCEDURE — 99214 OFFICE O/P EST MOD 30 MIN: CPT | Performed by: NURSE PRACTITIONER

## 2022-01-14 RX ORDER — LANCETS
EACH MISCELLANEOUS
Qty: 1 EACH | Refills: 11 | Status: SHIPPED | OUTPATIENT
Start: 2022-01-14

## 2022-01-14 RX ORDER — DOXYCYCLINE 100 MG/1
100 TABLET ORAL 2 TIMES DAILY
Qty: 20 TABLET | Refills: 0 | Status: SHIPPED | OUTPATIENT
Start: 2022-01-14 | End: 2022-01-24

## 2022-01-14 RX ORDER — INSULIN PUMP SYRINGE, 3 ML
EACH MISCELLANEOUS
Qty: 1 KIT | Refills: 0 | Status: SHIPPED | OUTPATIENT
Start: 2022-01-14 | End: 2022-07-01

## 2022-01-14 RX ORDER — HYDROCHLOROTHIAZIDE 12.5 MG/1
12.5 CAPSULE ORAL DAILY
Qty: 90 CAPSULE | Refills: 1 | Status: SHIPPED | OUTPATIENT
Start: 2022-01-14 | End: 2022-05-20 | Stop reason: ALTCHOICE

## 2022-01-14 NOTE — TELEPHONE ENCOUNTER
[facility] is calling because they received a supply order. That can only be used for in patient and it needs to be made as a prescription.  Please advise

## 2022-01-14 NOTE — PROGRESS NOTES
Chief Complaint   Patient presents with    Diabetes     follow up       1. \"Have you been to the ER, urgent care clinic since your last visit? Hospitalized since your last visit? \" No    2. \"Have you seen or consulted any other health care providers outside of the [address] [place] [address] since your last visit? \" No     3. For patients over 45: Has the patient had a colonoscopy? NA based on age or sex     If the patient is female:    3. For patients over 36: Has the patient had a mammogram? NA based on age or sex    11. For patients over 21: Has the patient had a pap smear?  Yes, HM satisfied with blue hyperlink    3 most recent PHQ Screens 1/14/2022   Little interest or pleasure in doing things Not at all   Feeling down, depressed, irritable, or hopeless Not at all   Total Score PHQ 2 0   Trouble falling or staying asleep, or sleeping too much -   Feeling tired or having little energy -   Poor appetite, weight loss, or overeating -   Feeling bad about yourself - or that you are a failure or have let yourself or your family down -   Trouble concentrating on things such as school, work, reading, or watching TV -   Moving or speaking so slowly that other people could have noticed; or the opposite being so fidgety that others notice -   Thoughts of being better off dead, or hurting yourself in some way -   PHQ 9 Score -   How difficult have these problems made it for you to do your work, take care of your home and get along with others -

## 2022-01-14 NOTE — PROGRESS NOTES
[address] [facility] Note  Subjective:      Rosalie Dewey is a 31 y.o. female who presents for follow up on chronic problems, she has history of morbid obesity, hypertension, hyperlipidemia, and diabetes. Taking medications as prescribed, no medication side effects reported. She is complaining of cyst under left breast that was draning green puss last week. Still is is red and has slight pain     Past Medical History:   Diagnosis Date    Gallstones     History of chickenpox 6/11/2010    Hyperlipidemia 6/24/2014    Hypertension     Not on any meds    Morbid obesity ([address]     CH (nonalcoholic steatohepatitis) 1/13/2016     Past Surgical History:   Procedure Laterality Date    HX CHOLECYSTECTOMY  7/2011       Current Outpatient Medications   Medication Sig Dispense Refill    hydroCHLOROthiazide (MICROZIDE) 12.5 mg capsule Take 1 Capsule by mouth daily. 90 Capsule 1    doxycycline (ADOXA) 100 mg tablet Take 1 Tablet by mouth two (2) times a day for 10 days. 20 Tablet 0    glucose blood VI test strips (ASCENSIA AUTODISC VI, ONE TOUCH ULTRA TEST VI) strip Check blood sugar daily 100 Strip 1    famotidine (PEPCID) 20 mg tablet TAKE 1 TABLET BY MOUTH TWO (2) TIMES A DAY. 60 Tablet 0    atorvastatin (LIPITOR) 40 mg tablet TAKE 1 TABLET BY MOUTH EVERY DAY 90 Tablet 3    ergocalciferol (ERGOCALCIFEROL) 1,250 mcg (50,000 unit) capsule TAKE 1 CAPSULE BY MOUTH EVERY SEVEN (7) DAYS. 12 Capsule 0    metFORMIN ER (GLUCOPHAGE XR) 500 mg tablet Take 2 Tablets by mouth two (2) times a day. 360 Tablet 0    sertraline (ZOLOFT) 50 mg tablet Take 1 Tablet by mouth daily. 90 Tablet 1    insulin glargine (LANTUS) 100 unit/mL injection 15 Units by SubCUTAneous route nightly.  1 mL 2    liraglutide (VICTOZA) 0.6 mg/0.1 mL (18 mg/3 mL) pnij Inject 0.6 mg once daily for 1 week, then increase to 1.2 mg once daily 4 Adjustable Dose Pre-filled Pen Syringe 5    BD Insulin Syringe Ultra-Fine 1 mL 30 gauge x 1/2\" syrg USE DAILY AS DIRECTED      glucose blood VI test strips (blood glucose test) strip Any brand, for uncontrolled DM E11.21 check up to 3x daily 300 Strip 0    lisinopriL (PRINIVIL, ZESTRIL) 5 mg tablet Take 1 Tab by mouth daily. 90 Tab 1    alcohol swabs (Alcohol Pads) padm Use to clear skin before SC injections 200 Pad 2    Insulin Needles, Disposable, (BD Delia 2nd Gen Pen Needle) 32 gauge x 5/32\" ndle Use daily as directed 100 Pen Needle 3    glipiZIDE (GLUCOTROL) 10 mg tablet TAKE 1 TABLET BY MOUTH TWICE A  Tab 3    Blood-Glucose Meter monitoring kit Fill with strips and lancets for bid testing. Uncontrolled type 2 DM E11.21 1 Kit 0     Allergies   Allergen Reactions    Pcn [Penicillins] Unable to Obtain     \"Childhood allergy\"  Ancef 2g given pre-op with no reaction noted to test dose. ROS:   Complete review of systems was reviewed with pertinent information listed in HPI. Review of Systems   Constitutional: Negative. HENT: Negative. Respiratory: Negative. Cardiovascular: Negative. Gastrointestinal: Negative. Genitourinary: Negative. Musculoskeletal: Negative. Objective:     Visit Vitals  /78 (BP 1 Location: Left upper arm, BP Patient Position: Sitting, BP Cuff Size: Large adult)   Pulse 85   Temp 98.3 °F (36.8 °C) (Temporal)   Resp 16   Ht 5' 2\" (1.575 m)   Wt (!) 351 lb 12.8 oz (159.6 kg)   LMP 10/04/2021   SpO2 95%   BMI 64.35 kg/m²       Vitals and Nurse Documentation reviewed. Physical Exam  Constitutional:       Appearance: Normal appearance. She is obese. HENT:      Mouth/Throat:      Mouth: Mucous membranes are moist.   Cardiovascular:      Rate and Rhythm: Normal rate and regular rhythm. Pulses: Normal pulses. Heart sounds: Normal heart sounds. No murmur heard. Pulmonary:      Effort: Pulmonary effort is normal.      Breath sounds: Normal breath sounds.    Abdominal:      General: Bowel sounds are normal. Palpations: Abdomen is soft. Musculoskeletal:      Cervical back: Normal range of motion and neck supple. Neurological:      Mental Status: She is alert. Psychiatric:         Mood and Affect: Mood normal.         Thought Content: Thought content normal.         Assessment/Plan:     Diagnoses and all orders for this visit:    1. Hyperlipidemia, unspecified hyperlipidemia type  -     LIPID PANEL; Future  -     METABOLIC PANEL, COMPREHENSIVE; Future    2. Type 2 diabetes with nephropathy (HCC)  -     glucose blood VI test strips (ASCENSIA AUTODISC VI, ONE TOUCH ULTRA TEST VI) strip; Check blood sugar daily    3. Uncontrolled type 2 diabetes mellitus with hyperglycemia (HCC)  -     HEMOGLOBIN A1C WITH EAG; Future    4. Major depressive disorder, recurrent episode, mild ([address]    5. Hypertension associated with diabetes ([address]  -     hydroCHLOROthiazide (MICROZIDE) 12.5 mg capsule; Take 1 Capsule by mouth daily. 6. Boil  -     doxycycline (ADOXA) 100 mg tablet; Take 1 Tablet by mouth two (2) times a day for 10 days.     7. Amenorrhea  -     HCG QL SERUM; Future          Pt expressed understanding with the diagnosis and plan        Discussed expected course/resolution/complications of diagnosis in detail with patient.    Medication risks/benefits/costs/interactions/alternatives discussed with patient.    Pt was given an after visit summary which includes diagnoses, current medications & vitals.  Pt expressed understanding with the diagnosis and plan

## 2022-01-15 DIAGNOSIS — N91.2 AMENORRHEA: Primary | ICD-10-CM

## 2022-02-09 NOTE — PROGRESS NOTES
Problem: Angina/Chest Pain  Goal: # Achieves Chest Pain Control (Pain Score = 0-1, no episodes)  Description: Chest pain control = Pain Score = 0-1, no episodes of pain  Outcome: Outcome Not Met, Plan Adjusted  Goal: Anxiety is controlled  Outcome: Outcome Not Met, Plan Adjusted  Goal: # Verbalizes understanding of symptoms, diagnosis, and treatment  Description: Document on Patient Education Activity  Outcome: Outcome Not Met, Plan Adjusted     Problem: Medication Nonadherence  Goal: #Patient identifies barriers to medication adherence  Outcome: Outcome Not Met, Plan Adjusted  Goal: #Verbalizes understanding of medications and refill process  Description: Document education using the patient education activity.  Outcome: Outcome Not Met, Plan Adjusted     Problem: Fluid Volume Excess, Risk for  Goal: # Absence of Rapid Weight Gain (no more than 2kg in 24 hours)  Description: FVE Risk Patients may gain weight (but not more than 2 kg) but may not require aggressive treatment if in the absence of dyspnea; FVE (actual) patients should be monitored to achieve no weight gain.   Outcome: Outcome Not Met, Plan Adjusted  Goal: # Absence of New Onset Dyspnea  Description: Dyspnea greater than SOB with Activity may be indicator of fluid volume excess  Outcome: Outcome Not Met, Plan Adjusted  Goal: # Verbalizes understanding of FVE prevention plan  Description: Document on Patient Education Activity  Outcome: Outcome Not Met, Plan Adjusted      Patient contacted regarding Michelle Marti. Discussed COVID-19 related testing which was not done at this time. Test results were not done. Care Transition Nurse/ Ambulatory Care Manager contacted the family by telephone to perform post discharge assessment. Call within 2 business days of discharge: Yes Verified name and  with family as identifiers. Provided introduction to self, and explanation of the CTN/ACM role, and reason for call due to risk factors for infection and/or exposure to COVID-19. Symptoms reviewed with family who verbalized the following symptoms: no new symptoms      Due to no new or worsening symptoms encounter was not routed to provider for escalation. Discussed follow-up appointments. If no appointment was previously scheduled, appointment scheduling offered:  [facility] follow up appointment(s):   Future Appointments   Date Time Provider Gabino Reynolds   1/15/2021  9:00 AM Arsen Ortiz MD PAFP BS [facility]     Non[facility] follow up appointment(s): none     Advance Care Planning:   Does patient have an Advance Directive:  patient declined education. Patient has following risk factors of: diabetes. CTN/ACM reviewed discharge instructions, medical action plan and red flags such as increased shortness of breath, increasing fever and signs of decompensation with family who verbalized understanding. Discussed exposure protocols and quarantine with CDC Guidelines What to do if you are sick with coronavirus disease .  Family was given an opportunity for questions and concerns. The family agrees to contact the [facility] exposure line [phone], [facility] department [address] [place] [address] [phone] and PCP office for questions related to their healthcare. CTN/ACM provided contact information for future needs.     Reviewed and educated family on any new and changed medications related to discharge diagnosis     Patient/family/caregiver given information for [employer] and agrees to enroll no   14 day call based on severity of symptoms and risk factors.

## 2022-03-15 RX ORDER — ERGOCALCIFEROL 1.25 MG/1
50000 CAPSULE ORAL
Qty: 12 CAPSULE | Refills: 0 | Status: SHIPPED | OUTPATIENT
Start: 2022-03-15

## 2022-03-18 PROBLEM — M17.11 PRIMARY OSTEOARTHRITIS OF RIGHT KNEE: Status: ACTIVE | Noted: 2019-06-25

## 2022-03-19 PROBLEM — R79.89 ELEVATED TSH: Status: ACTIVE | Noted: 2017-09-08

## 2022-03-19 PROBLEM — E11.21 TYPE 2 DIABETES WITH NEPHROPATHY (HCC): Status: ACTIVE | Noted: 2019-01-16

## 2022-03-22 RX ORDER — FAMOTIDINE 20 MG/1
TABLET, FILM COATED ORAL
Qty: 60 TABLET | Refills: 0 | Status: SHIPPED | OUTPATIENT
Start: 2022-03-22 | End: 2022-06-14 | Stop reason: ALTCHOICE

## 2022-04-12 ENCOUNTER — OFFICE VISIT (OUTPATIENT)
Dept: FAMILY MEDICINE CLINIC | Age: 37
End: 2022-04-12
Payer: MEDICAID

## 2022-04-12 VITALS
SYSTOLIC BLOOD PRESSURE: 138 MMHG | DIASTOLIC BLOOD PRESSURE: 86 MMHG | HEIGHT: 62 IN | TEMPERATURE: 97.7 F | WEIGHT: 293 LBS | HEART RATE: 92 BPM | BODY MASS INDEX: 53.92 KG/M2 | RESPIRATION RATE: 18 BRPM | OXYGEN SATURATION: 98 %

## 2022-04-12 DIAGNOSIS — E11.21 TYPE 2 DIABETES WITH NEPHROPATHY (HCC): Primary | ICD-10-CM

## 2022-04-12 DIAGNOSIS — E55.9 VITAMIN D DEFICIENCY: Chronic | ICD-10-CM

## 2022-04-12 DIAGNOSIS — I15.2 HYPERTENSION ASSOCIATED WITH DIABETES (HCC): ICD-10-CM

## 2022-04-12 DIAGNOSIS — E11.65 UNCONTROLLED TYPE 2 DIABETES MELLITUS WITH HYPERGLYCEMIA (HCC): ICD-10-CM

## 2022-04-12 DIAGNOSIS — E11.59 HYPERTENSION ASSOCIATED WITH DIABETES (HCC): ICD-10-CM

## 2022-04-12 DIAGNOSIS — E66.01 OBESITY, MORBID (HCC): ICD-10-CM

## 2022-04-12 LAB — HBA1C MFR BLD HPLC: 9 %

## 2022-04-12 PROCEDURE — 99214 OFFICE O/P EST MOD 30 MIN: CPT | Performed by: STUDENT IN AN ORGANIZED HEALTH CARE EDUCATION/TRAINING PROGRAM

## 2022-04-12 PROCEDURE — 83036 HEMOGLOBIN GLYCOSYLATED A1C: CPT | Performed by: STUDENT IN AN ORGANIZED HEALTH CARE EDUCATION/TRAINING PROGRAM

## 2022-04-12 PROCEDURE — 3046F HEMOGLOBIN A1C LEVEL >9.0%: CPT | Performed by: STUDENT IN AN ORGANIZED HEALTH CARE EDUCATION/TRAINING PROGRAM

## 2022-04-12 RX ORDER — LISINOPRIL 5 MG/1
5 TABLET ORAL DAILY
Qty: 90 TABLET | Refills: 1 | Status: SHIPPED | OUTPATIENT
Start: 2022-04-12

## 2022-04-12 RX ORDER — GLIPIZIDE 10 MG/1
TABLET ORAL
Qty: 180 TABLET | Refills: 3 | Status: SHIPPED | OUTPATIENT
Start: 2022-04-12 | End: 2022-09-06

## 2022-04-12 RX ORDER — BLOOD-GLUCOSE METER
EACH MISCELLANEOUS
COMMUNITY
Start: 2022-01-14

## 2022-04-12 NOTE — PROGRESS NOTES
Chief Complaint   Patient presents with    Diabetes     1. \"Have you been to the ER, urgent care clinic since your last visit? Hospitalized since your last visit? \" No    2. \"Have you seen or consulted any other health care providers outside of the [address] [place] [address] since your last visit? \" No     3. For patients aged 39-70: Has the patient had a colonoscopy / FIT/ Cologuard? No      If the patient is female:    4. For patients aged 41-77: Has the patient had a mammogram within the past 2 years? NA - based on age or sex      11. For patients aged 21-65: Has the patient had a pap smear?  Yes - no Care Gap present

## 2022-04-12 NOTE — PROGRESS NOTES
Crisedla Clemens  31 y.o. female  1985  [place]  [address]  [id number]     [address] [facility]       Chief Complaint:  Source:    Subjective  Criselda Clemens is an 31 y.o. female who presents for DM Followup    DIABETIC CARE CHECKLIST   1. Patient on ASA - not daily   2. Patient on Statin- Lipitor 40mg QHS  3. Patient on ACE- lisinopril 5mg (hasn't been taking for 1 month)  4. Diet- eats out 1-2X/week usually at [employer], [employer]. Average dinner small piece of steak with onions and peppers, small baked potato, broccoli  5. Exercise - wants to get out but energy is low. Denies life stressors. 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 Blood pressure today - 138/86  7. Last eye check - a few months ago, [facility]  8. Last cholesterol check -   Lab Results   Component Value Date/Time    Cholesterol, total 136 01/14/2022 10:08 AM    HDL Cholesterol 39 01/14/2022 10:08 AM    LDL, calculated 52.2 01/14/2022 10:08 AM    VLDL, calculated 44.8 01/14/2022 10:08 AM    Triglyceride 224 (H) 01/14/2022 10:08 AM    CHOL/HDL Ratio 3.5 01/14/2022 10:08 AM     9 . Last HbA1c - 10.3% (1/14/2022)  10. Last urine microalbulmin- 9/2021, neg however elevated ratio previously  11. Last comprehensive foot exam -  ~3 months ago  12. Did patient receive pneumococcal vaccine -yes 9/2020  13. Does the patient have a nephrologist- never   14. Does the patient have a cardiologist- never   13. Seasonal influenza vaccine - yes        Allergies - reviewed: Allergies   Allergen Reactions    Pcn [Penicillins] Unable to Obtain     \"Childhood allergy\"  Ancef 2g given pre-op with no reaction noted to test dose.          Medications - reviewed:   Current Outpatient Medications   Medication Sig    OneTouch Ultra2 Meter misc TEST BLOOD SUGAR DAILY    glipiZIDE (GLUCOTROL) 10 mg tablet TAKE 1 TABLET BY MOUTH TWICE A DAY    lisinopriL (PRINIVIL, ZESTRIL) 5 mg tablet Take 1 Tablet by mouth daily.  famotidine (PEPCID) 20 mg tablet TAKE 1 TABLET BY MOUTH TWO TIMES A DAY.  ergocalciferol (ERGOCALCIFEROL) 1,250 mcg (50,000 unit) capsule TAKE 1 CAPSULE BY MOUTH EVERY SEVEN (7) DAYS.  glucose blood VI test strips (ASCENSIA AUTODISC VI, ONE TOUCH ULTRA TEST VI) strip Check blood sugar daily    lancets misc Use with one touch test daily    Blood-Glucose Meter monitoring kit One touch glucometer test daily    atorvastatin (LIPITOR) 40 mg tablet TAKE 1 TABLET BY MOUTH EVERY DAY    metFORMIN ER (GLUCOPHAGE XR) 500 mg tablet Take 2 Tablets by mouth two (2) times a day.  sertraline (ZOLOFT) 50 mg tablet Take 1 Tablet by mouth daily.  insulin glargine (LANTUS) 100 unit/mL injection 15 Units by SubCUTAneous route nightly.  liraglutide (VICTOZA) 0.6 mg/0.1 mL (18 mg/3 mL) pnij Inject 0.6 mg once daily for 1 week, then increase to 1.2 mg once daily    BD Insulin Syringe Ultra-Fine 1 mL 30 gauge x 1/2\" syrg USE DAILY AS DIRECTED    glucose blood VI test strips (blood glucose test) strip Any brand, for uncontrolled DM E11.21 check up to 3x daily    alcohol swabs (Alcohol Pads) padm Use to clear skin before SC injections    Insulin Needles, Disposable, (BD Delia 2nd Gen Pen Needle) 32 gauge x 5/32\" ndle Use daily as directed    Blood-Glucose Meter monitoring kit Fill with strips and lancets for bid testing. Uncontrolled type 2 DM E11.21    hydroCHLOROthiazide (MICROZIDE) 12.5 mg capsule Take 1 Capsule by mouth daily. (Patient not taking: Reported on 4/12/2022)     No current facility-administered medications for this visit.          Past Medical History - reviewed:  Past Medical History:   Diagnosis Date    Gallstones     History of chickenpox 6/11/2010    Hyperlipidemia 6/24/2014    Hypertension     Not on any meds    Morbid obesity ([address]     CH (nonalcoholic steatohepatitis) 1/13/2016         Past Surgical History - reviewed: Past Surgical History:   Procedure Laterality Date    HX CHOLECYSTECTOMY  7/2011         Social History - reviewed:  Social History     Socioeconomic History    Marital status: SINGLE     Spouse name: Not on file    Number of children: Not on file    Years of education: Not on file    Highest education level: Not on file   Occupational History    Not on file   Tobacco Use    Smoking status: Never Smoker    Smokeless tobacco: Never Used   Vaping Use    Vaping Use: Never used   Substance and Sexual Activity    Alcohol use: Yes     Alcohol/week: 0.0 standard drinks     Comment: occasional etoh    Drug use: Yes     Types: Prescription, Marijuana     Comment: Lghlty smoke s marijuana    Sexual activity: Yes     Partners: Male   Other Topics Concern    Not on file   Social History Narrative    Not on file     Social Determinants of Health     Financial Resource Strain:     Difficulty of Paying Living Expenses: Not on file   Food Insecurity:     Worried About Running Out of Food in the Last Year: Not on file    Chary of Food in the Last Year: Not on file   Transportation Needs:     Lack of Transportation (Medical): Not on file    Lack of Transportation (Non-Medical):  Not on file   Physical Activity:     Days of Exercise per Week: Not on file    Minutes of Exercise per Session: Not on file   Stress:     Feeling of Stress : Not on file   Social Connections:     Frequency of Communication with Friends and Family: Not on file    Frequency of Social Gatherings with Friends and Family: Not on file    Attends [employer] Services: Not on file    Active Member of Clubs or Organizations: Not on file    Attends Club or Organization Meetings: Not on file    Marital Status: Not on file   Intimate Partner Violence:     Fear of Current or Ex-Partner: Not on file    Emotionally Abused: Not on file    Physically Abused: Not on file    Sexually Abused: Not on file   Housing Stability:     Unable to Pay for Housing in the Last Year: Not on file    Number of Places Lived in the Last Year: Not on file    Unstable Housing in the Last Year: Not on file         Family History - reviewed:  Family History   Problem Relation Age of Onset    High Cholesterol Mother     Diabetes Father     Heart Failure Father     Hypertension Father     Hypertension Maternal Grandmother     Emphysema Maternal Grandfather     Diabetes Maternal Grandfather     Hypertension Maternal Grandfather     Cancer Maternal Grandfather         prostate cancer    Heart Disease Maternal Aunt          Immunizations - reviewed:   Immunization History   Administered Date(s) Administered    COVID-19, Jeffrey Ramirez, Primary or Immunocompromised Series, MRNA, PF, 100mcg/0.5mL 01/28/2021, 02/26/2021    Influenza Vaccine 10/25/2016    Pneumococcal Polysaccharide (PPSV-23) 09/16/2020       Review of Systems   Eyes: Negative for blurred vision and double vision. Cardiovascular: Negative for chest pain and palpitations. Gastrointestinal: Negative for abdominal pain. Musculoskeletal: Negative for myalgias. Neurological: Negative for dizziness, tingling, sensory change and headaches. Endo/Heme/Allergies: Negative for polydipsia. Physical Exam  Visit Vitals  /86 (BP 1 Location: Left upper arm, BP Patient Position: Sitting, BP Cuff Size: Large adult)   Pulse 92   Temp 97.7 °F (36.5 °C) (Temporal)   Resp 18   Ht 5' 2\" (1.575 m)   Wt (!) 352 lb (159.7 kg)   LMP 03/16/2022 (Approximate)   SpO2 98%   BMI 64.38 kg/m²       Physical Exam  Vitals and nursing note reviewed. Constitutional:       General: She is not in acute distress. Appearance: She is obese. She is not ill-appearing, toxic-appearing or diaphoretic. Eyes:      Extraocular Movements: Extraocular movements intact. Pupils: Pupils are equal, round, and reactive to light. Cardiovascular:      Rate and Rhythm: Normal rate and regular rhythm. Pulses: Normal pulses. Heart sounds: Normal heart sounds. No murmur heard. No gallop. Musculoskeletal:      Right lower leg: No edema. Left lower leg: No edema. Neurological:      General: No focal deficit present. Mental Status: She is alert and oriented to person, place, and time. Psychiatric:         Mood and Affect: Mood normal.         Behavior: Behavior normal.         Thought Content: Thought content normal.         Judgment: Judgment normal.       LABS - personally reviewed  Recent Results (from the past 12 hour(s))   AMB POC HEMOGLOBIN A1C    Collection Time: 04/12/22 11:26 AM   Result Value Ref Range    Hemoglobin A1c (POC) 9.0 %         Assessment/Plan    ICD-10-CM ICD-9-CM    1. Type 2 diabetes with nephropathy (HCC)  E11.21 250.40 AMB POC HEMOGLOBIN A1C     583.81    2. Uncontrolled type 2 diabetes mellitus with hyperglycemia (HCC)  E11.65 250.02 glipiZIDE (GLUCOTROL) 10 mg tablet   3. Hypertension associated with diabetes ([facility]  E11.59 250.80 lisinopriL (PRINIVIL, ZESTRIL) 5 mg tablet    I15.2 401.9    4. Vitamin D deficiency  E55.9 268.9        Homa Jiménez is an 31 y.o. female with hx of poorly controlled DM with nephropathy, HLD, Obesity, Vit D deficiency here today for followup of DM. A1c today 9.0, improved from 10.3 at prior visit    1. Type 2 diabetes with nephropathy ([facility]): A1c not at goal however with significant progress after last check on current regimen. Spent significant time discussing diet and provided ADA handouts. Will continue current medications and check A1c at 3mo followup prior to making adjustments  - AMB POC HEMOGLOBIN A1C  - continue Lantus 15U QHS, Victoza, glipizide. Metformin  - reduce eating out to one weekly or once every other week over next 3 months  - increase exercise to 15min daily  - meet with pharmacy for diabetic education  2.  Uncontrolled type 2 diabetes mellitus with hyperglycemia ([facility]): see above  - glipiZIDE (GLUCOTROL) 10 mg tablet; TAKE 1 TABLET BY MOUTH TWICE A DAY  Dispense: 180 Tablet; Refill: 3    3. Hypertension associated with diabetes ([address]): BP at goal today though borderline  - take BP BID x 1 week and send readings via MyChart  - goal of 10% weight reduction; discussed diet and exercise extensively today  - continue HCTZ 12.5mg daily  - Continue lisinopriL (PRINIVIL, ZESTRIL) 5 mg tablet; Take 1 Tablet by mouth daily. Dispense: 90 Tablet; Refill: 1; will increase to 10mg at next visit if BP continues to be borderline    4. Vitamin D deficiency  - on 50,000U Qweek  - check levels at 3 month followup    5. Obesity   - goal of forming one new habit q4-6weeks - first is to reduce eating out  - reviewed diabetic plate with ADA materials  - encouraged daily exercise both through work and with her boyfriend after work  - will attempt aggressive lifestyle changes x 12months and then revisit surgical options depending on progress  - f/u weight x 3 months            Patient informed to follow up: Follow-up and Dispositions    · Return in about 3 months (around 7/12/2022) for diabetes followup. I have discussed the diagnosis with the patient and the intended plan as seen in the above orders. Patient verbalized understanding of the plan and agrees with the plan. The patient has received an after-visit summary and questions were answered concerning future plans. I have discussed medication side effects and warnings with the patient as well. Informed patient to return to the office if new symptoms arise.       Tammie Eason MD  [facility]

## 2022-04-25 ENCOUNTER — PATIENT MESSAGE (OUTPATIENT)
Dept: FAMILY MEDICINE CLINIC | Age: 37
End: 2022-04-25

## 2022-04-26 DIAGNOSIS — E11.21 TYPE 2 DIABETES WITH NEPHROPATHY (HCC): Primary | ICD-10-CM

## 2022-04-26 NOTE — TELEPHONE ENCOUNTER
Returned patient call regarding throwing up whenever she is taking medications but unable to tolerate food and drink. BG running 110s-120s at home. Metformin tablets change from round to oblong recently and she feels these tablets get stuck in her throat. Has not taken most meds since vomiting yesterday. BP is WNL on home readings. Suggested stopping metformin for 1-2 days to determine that it is the definite cause of vomiting and then resuming slowly and taking in applesauce or with yogurt to give it a coating. Will review med list to attempt to combine some medications so she has fewer pills to take.

## 2022-05-06 ENCOUNTER — TELEPHONE (OUTPATIENT)
Dept: FAMILY MEDICINE CLINIC | Age: 37
End: 2022-05-06

## 2022-05-06 NOTE — TELEPHONE ENCOUNTER
Pharmacy Progress Note - Telephone Encounter    S/O: Ms. Homa Jiménez 31 y.o. female, referred by Dr. John Marina MD, was contacted via an outbound telephone call to discuss scheduling for polypharmacy visit today. Verified patients identifiers (name & ) per HIPAA policy. - Pt amenable to PharmD visit to discuss decreasing number of meds. A/P:  - Several opportunities for decreasing polypharmacy found - combo Lisinopril/HCTZ, weekly GLP-1 agonist, sulfonylurea. - Scheduled 22 at 9AM  - Patient endorses understanding to the provided information. All questions answered at this time. There are no discontinued medications. No orders of the defined types were placed in this encounter. Elly Sarmiento PharmD, [facility] [place]  Clinical Pharmacist Specialist      For Pharmacy Admin Tracking Only     CPA in place:  Yes   Recommendation Provided To: Patient/Caregiver: 1 via Telephone   Intervention Detail: Scheduled Appointment   Intervention Accepted By: Patient/Caregiver: 1   Time Spent (min): 5

## 2022-05-20 ENCOUNTER — OFFICE VISIT (OUTPATIENT)
Dept: FAMILY MEDICINE CLINIC | Age: 37
End: 2022-05-20

## 2022-05-20 VITALS
DIASTOLIC BLOOD PRESSURE: 83 MMHG | WEIGHT: 293 LBS | BODY MASS INDEX: 63.91 KG/M2 | SYSTOLIC BLOOD PRESSURE: 137 MMHG | HEART RATE: 105 BPM

## 2022-05-20 DIAGNOSIS — E11.21 TYPE 2 DIABETES WITH NEPHROPATHY (HCC): Primary | ICD-10-CM

## 2022-05-20 DIAGNOSIS — I15.2 HYPERTENSION ASSOCIATED WITH DIABETES (HCC): ICD-10-CM

## 2022-05-20 DIAGNOSIS — E11.59 HYPERTENSION ASSOCIATED WITH DIABETES (HCC): ICD-10-CM

## 2022-05-20 RX ORDER — DULAGLUTIDE 1.5 MG/.5ML
1.5 INJECTION, SOLUTION SUBCUTANEOUS
Qty: 4 EACH | Refills: 1 | Status: SHIPPED | OUTPATIENT
Start: 2022-05-20 | End: 2022-07-01 | Stop reason: SDUPTHER

## 2022-05-20 RX ORDER — SEMAGLUTIDE 1.34 MG/ML
0.5 INJECTION, SOLUTION SUBCUTANEOUS
Qty: 1 BOX | Refills: 1 | Status: SHIPPED | OUTPATIENT
Start: 2022-05-20 | End: 2022-05-20

## 2022-05-20 NOTE — PROGRESS NOTES
Pharmacy Progress Note - Diabetes Management    S/O: Ms. Liberty Mckinley is a 31 y.o. female, referred by Dr. Roselia Dumont MD, with a PMH of HTN, hx of gallstones, CH, T2DM, OA, vit D deficiency, HLD, obesity, was seen today for diabetes management. Patient's last A1c was 9.0% (April 2022) which is decreased from 10.3% (Jan 2022) which is increased from 8.3% (Oct 2021). Interim update: Pt was referred to this writer for polypharmacy and concerns for DM. Pt's med regimen was evaluated for opportunities to decrease medication burden. Discussed pathophysiology of DM, complications, progression, BG/A1c goals. Discussed food choices - Healthy Plate Method, carb goals, how to read a nutrition label. Discussed physical activity goal - 150 min moderate exercise per week. Discussed medications - mechanism of action, side effects, efficacy. Pt states that she had been experiencing nausea/vomiting most evening until she started cutting Metformin in half and taking with applesauce. Pt takes entire dose, just cut. Discussed CGM sensor. Pt interested in trying sample sensor. Pt states she has been on Pepcid BID for acid reflux. States if she was not taking med, she would have sx every other day. Sx are dependent on food choices - spicy foods. 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     Current anti-hyperglycemic regimen includes:    - Metformin  mg 2 tabs BID  - Victoza 1.8 mg daily  - Glipizide 10 mg BID  - Lantus 15 units QHS    ROS:  Today, Pt endorses:  - Symptoms of Hyperglycemia: none  - Symptoms of Hypoglycemia: none    Self Monitoring Blood Glucose (SMBG) or CGM:  - Brought in home glucometer/blood glucose log/CGM reader today:  no  Fasting - 170s-180s  After Lunch - low 100s  After Dinner - 114-120s    Physical Activity:   yes  - Consists of being active at work, taking care of people in group home  - Works a lot of hours a week      Vitals:   Wt Readings from Last 3 Encounters:   04/12/22 (!) 352 lb (159.7 kg)   01/14/22 (!) 351 lb 12.8 oz (159.6 kg)   10/06/21 341 lb 3.2 oz (154.8 kg)     BP Readings from Last 3 Encounters:   04/12/22 138/86   01/14/22 120/78   10/06/21 123/85     Pulse Readings from Last 3 Encounters:   04/12/22 92   01/14/22 85   10/06/21 90       Past Medical History:   Diagnosis Date    Gallstones     History of chickenpox 6/11/2010    Hyperlipidemia 6/24/2014    Hypertension     Not on any meds    Morbid obesity ([address]     CH (nonalcoholic steatohepatitis) 1/13/2016     Allergies   Allergen Reactions    Pcn [Penicillins] Unable to Obtain     \"Childhood allergy\"  Ancef 2g given pre-op with no reaction noted to test dose. Current Outpatient Medications   Medication Sig    OneTouch Ultra2 Meter misc TEST BLOOD SUGAR DAILY    glipiZIDE (GLUCOTROL) 10 mg tablet TAKE 1 TABLET BY MOUTH TWICE A DAY    lisinopriL (PRINIVIL, ZESTRIL) 5 mg tablet Take 1 Tablet by mouth daily.  famotidine (PEPCID) 20 mg tablet TAKE 1 TABLET BY MOUTH TWO TIMES A DAY.  ergocalciferol (ERGOCALCIFEROL) 1,250 mcg (50,000 unit) capsule TAKE 1 CAPSULE BY MOUTH EVERY SEVEN (7) DAYS.  hydroCHLOROthiazide (MICROZIDE) 12.5 mg capsule Take 1 Capsule by mouth daily. (Patient not taking: Reported on 4/12/2022)    glucose blood VI test strips (ASCENSIA AUTODISC VI, ONE TOUCH ULTRA TEST VI) strip Check blood sugar daily    lancets misc Use with one touch test daily    Blood-Glucose Meter monitoring kit One touch glucometer test daily    atorvastatin (LIPITOR) 40 mg tablet TAKE 1 TABLET BY MOUTH EVERY DAY    metFORMIN ER (GLUCOPHAGE XR) 500 mg tablet Take 2 Tablets by mouth two (2) times a day.  sertraline (ZOLOFT) 50 mg tablet Take 1 Tablet by mouth daily.  insulin glargine (LANTUS) 100 unit/mL injection 15 Units by SubCUTAneous route nightly.     liraglutide (VICTOZA) 0.6 mg/0.1 mL (18 mg/3 mL) pnij Inject 0.6 mg once daily for 1 week, then increase to 1.2 mg once daily    BD Insulin Syringe Ultra-Fine 1 mL 30 gauge x 1/2\" syrg USE DAILY AS DIRECTED    glucose blood VI test strips (blood glucose test) strip Any brand, for uncontrolled DM E11.21 check up to 3x daily    alcohol swabs (Alcohol Pads) padm Use to clear skin before SC injections    Insulin Needles, Disposable, (BD Delia 2nd Gen Pen Needle) 32 gauge x 5/32\" ndle Use daily as directed    Blood-Glucose Meter monitoring kit Fill with strips and lancets for bid testing. Uncontrolled type 2 DM E11.21     No current facility-administered medications for this visit. Lab Results   Component Value Date/Time    Sodium 137 01/14/2022 10:08 AM    Potassium 4.4 01/14/2022 10:08 AM    Chloride 103 01/14/2022 10:08 AM    CO2 31 01/14/2022 10:08 AM    Anion gap 3 (L) 01/14/2022 10:08 AM    Glucose 267 (H) 01/14/2022 10:08 AM    BUN 9 01/14/2022 10:08 AM    Creatinine 0.58 01/14/2022 10:08 AM    BUN/Creatinine ratio 16 01/14/2022 10:08 AM    GFR est AA >60 01/14/2022 10:08 AM    GFR est non-AA >60 01/14/2022 10:08 AM    Calcium 9.9 01/14/2022 10:08 AM    Bilirubin, total 0.2 01/14/2022 10:08 AM    Alk.  phosphatase 131 (H) 01/14/2022 10:08 AM    Protein, total 7.1 01/14/2022 10:08 AM    Albumin 3.1 (L) 01/14/2022 10:08 AM    Globulin 4.0 01/14/2022 10:08 AM    A-G Ratio 0.8 (L) 01/14/2022 10:08 AM    ALT (SGPT) 21 01/14/2022 10:08 AM     Lab Results   Component Value Date/Time    Cholesterol, total 136 01/14/2022 10:08 AM    HDL Cholesterol 39 01/14/2022 10:08 AM    LDL, calculated 52.2 01/14/2022 10:08 AM    VLDL, calculated 44.8 01/14/2022 10:08 AM    Triglyceride 224 (H) 01/14/2022 10:08 AM    CHOL/HDL Ratio 3.5 01/14/2022 10:08 AM     Lab Results   Component Value Date/Time    WBC 8.6 10/06/2021 10:46 AM    HGB 11.4 (L) 10/06/2021 10:46 AM    HCT 36.3 10/06/2021 10:46 AM    PLATELET 187 28/97/5307 10:46 AM    MCV 85.8 10/06/2021 10:46 AM       Lab Results   Component Value Date/Time    Microalbumin/Creat ratio (mg/g creat) 22 10/06/2021 10:46 AM    Microalbumin,urine random 3.65 10/06/2021 10:46 AM       Lab Results   Component Value Date/Time    Hemoglobin A1c 10.3 (H) 01/14/2022 10:08 AM    Hemoglobin A1c 8.3 (H) 10/06/2021 10:46 AM    Hemoglobin A1c 6.8 (H) 06/22/2021 03:54 PM     Hemoglobin A1c (POC)   Date Value Ref Range Status   04/12/2022 9.0 % Final        CrCl cannot be calculated (Unknown ideal weight. ). A/P:    1) T2DM: Per ADA guidelines, Pt's A1c is not at goal of < 6.5%. Current SMBG(s)/CGM trend is relatively well controlled after meals per pt and uncontrolled during fasting rdgs. She is diligent in checking BG while at work. In order to maintain better control and decrease med burden, will switch Victoza daily to Trulicity weekly. Completed demo training. Pt's desire is to decrease and come off insulin if possible. Pt is on max dose of Metformin, max dose of sulfonylurea, and low dose of insulin. Will titrate Trulicity dose up and attempt to decrease insulin dose. She may be able to transition to an SGLT2 inhibitor as well.  - STOP Victoza  - START Trulicity 1.5 mg weekly  - Continue Metformin  mg 2 tabs BID  - Continue Glipizide 10 mg BID  - Continue Lantus 15 units QHS  - Provide CGM sensor sample at f/up visit    2) HTN: BP is not at goal of < 130/80. Currently taking low dose ACEi. No evidence of microalbuminuria (2022). On first line tx for HTN in DM. May require to increase at future visit for control.  - Continue Lisinopril 5 mg daily  - Will eval home BP at f/up appt    3) Primary Prevention ASCVD: Per ADA guidelines, pts age 43-69 yrs are recommended for statin therapy. Currently taking on high intensity statin. LDL <100 mg/dL  - Continue Atorvastatin 40 mg daily    4) Acid reflux: Sx are food dependent. H2RA therapy are for preventive therapy. She may receive relief with on demand therapy.   - Pt to consider stopping Pepcid  - Pt to consider starting Tums 500 mg every day PRN sx        Medication reconciliation was completed during the visit. Medications Discontinued During This Encounter   Medication Reason    liraglutide (VICTOZA) 0.6 mg/0.1 mL (18 mg/3 mL) pnij Therapy Completed    hydroCHLOROthiazide (MICROZIDE) 12.5 mg capsule Therapy Completed    semaglutide (Ozempic) 0.25 mg or 0.5 mg/dose (2 mg/1.5 ml) subq pen Cost of Medication     Orders Placed This Encounter    DISCONTD: semaglutide (Ozempic) 0.25 mg or 0.5 mg/dose (2 mg/1.5 ml) subq pen     Si.5 mg by SubCUTAneous route every seven (7) days. Dispense:  1 Box     Refill:  1    dulaglutide (Trulicity) 1.5 ZZ/6.0 mL sub-q pen     Si.5 mL by SubCUTAneous route every seven (7) days. Dispense:  4 Each     Refill:  1       Patient verbalized understanding of the information presented and all of the patients questions were answered. AVS was handed to the patient. Patient advised to call the office with any additional questions or concerns. Notifications of recommendations will be sent to Dr. John Marina MD for review. Patient will return to clinic in 4 week(s) for follow up. Mikki CastanonD, [facility] [place]  Clinical Pharmacist Specialist          For Pharmacy Admin Tracking Only     CPA in place:  Yes   Recommendation Provided To: Patient/Caregiver: 6 via In person   Intervention Detail: Device Training, Discontinued Rx: 2, reason: Therapy De-escalation, New Rx: 2, reason: Needs Additional Therapy and Patient Preference and Scheduled Appointment   Intervention Accepted By: Patient/Caregiver: 6   Time Spent (min): 60

## 2022-05-20 NOTE — PATIENT INSTRUCTIONS
Your A1c was 9.0% which was uncontrolled. STOP Victoza  START Trulicity 1.5 mg weekly. At your next appt you will receive a Freestyle Radha 2 sensor sample. Download the FreeCharge netta prior to coming. You can consider switching Pepcid to Tums 500 mg as needed with meals.     Blood Sugar Goal  Fastin-130 mg/dL  1-2 after meals: Less than 180 mg/dL    Carbohydrate Goals  Meal: 30-45 grams   Snacks: 15 grams

## 2022-05-26 ENCOUNTER — PATIENT MESSAGE (OUTPATIENT)
Dept: FAMILY MEDICINE CLINIC | Age: 37
End: 2022-05-26

## 2022-05-26 DIAGNOSIS — E11.65 UNCONTROLLED TYPE 2 DIABETES MELLITUS WITH HYPERGLYCEMIA (HCC): ICD-10-CM

## 2022-05-26 DIAGNOSIS — E11.21 TYPE 2 DIABETES WITH NEPHROPATHY (HCC): ICD-10-CM

## 2022-05-27 ENCOUNTER — OFFICE VISIT (OUTPATIENT)
Dept: FAMILY MEDICINE CLINIC | Age: 37
End: 2022-05-27
Payer: MEDICAID

## 2022-05-27 VITALS
OXYGEN SATURATION: 98 % | HEIGHT: 62 IN | DIASTOLIC BLOOD PRESSURE: 88 MMHG | HEART RATE: 94 BPM | WEIGHT: 293 LBS | BODY MASS INDEX: 53.92 KG/M2 | TEMPERATURE: 97.3 F | SYSTOLIC BLOOD PRESSURE: 139 MMHG | RESPIRATION RATE: 18 BRPM

## 2022-05-27 DIAGNOSIS — N89.8 VAGINAL DISCHARGE: ICD-10-CM

## 2022-05-27 DIAGNOSIS — B37.9 YEAST INFECTION: Primary | ICD-10-CM

## 2022-05-27 PROCEDURE — 99214 OFFICE O/P EST MOD 30 MIN: CPT | Performed by: STUDENT IN AN ORGANIZED HEALTH CARE EDUCATION/TRAINING PROGRAM

## 2022-05-27 RX ORDER — INSULIN GLARGINE 100 [IU]/ML
INJECTION, SOLUTION SUBCUTANEOUS
Qty: 10 ML | Refills: 2 | Status: SHIPPED | OUTPATIENT
Start: 2022-05-27 | End: 2022-06-14 | Stop reason: ALTCHOICE

## 2022-05-27 RX ORDER — METFORMIN HYDROCHLORIDE 500 MG/1
TABLET, EXTENDED RELEASE ORAL
Qty: 120 TABLET | Refills: 2 | Status: SHIPPED | OUTPATIENT
Start: 2022-05-27 | End: 2022-08-22

## 2022-05-27 RX ORDER — FLUCONAZOLE 150 MG/1
TABLET ORAL
Qty: 2 TABLET | Refills: 0 | Status: SHIPPED | OUTPATIENT
Start: 2022-05-27 | End: 2022-07-14

## 2022-05-27 NOTE — PROGRESS NOTES
Chief Complaint   Patient presents with    Vaginal Itching     Apox 2 days     1. \"Have you been to the ER, urgent care clinic since your last visit? Hospitalized since your last visit? \" No    2. \"Have you seen or consulted any other health care providers outside of the [address] [place] [address] since your last visit? \" No     3. For patients aged 39-70: Has the patient had a colonoscopy / FIT/ Cologuard? NA - based on age      If the patient is female:    4. For patients aged 41-77: Has the patient had a mammogram within the past 2 years? NA - based on age or sex      11. For patients aged 21-65: Has the patient had a pap smear?  Yes - no Care Gap present

## 2022-05-27 NOTE — PROGRESS NOTES
Ginna Huggins  31 y.o. female  1985  [place] [id number]  [id number]     [address] [facility]       Chief Complaint: vaginal discharge   Source: self     Subjective  Mechelle Terrazas is an 31 y.o. female who presents for 2 days of itching in vaginal area - not as bad today as yesterday. Did one dose of monistat - itching got worse. No new sexual partners - just one male partner. She has not noticed a change or new vaginal discharge. She is currently on her period and there is no chance of pregnancy    Allergies - reviewed: Allergies   Allergen Reactions    Pcn [Penicillins] Unable to Obtain     \"Childhood allergy\"  Ancef 2g given pre-op with no reaction noted to test dose. Medications - reviewed:   Current Outpatient Medications   Medication Sig    metFORMIN ER (GLUCOPHAGE XR) 500 mg tablet TAKE 2 TABLETS BY MOUTH TWICE DAILY    Lantus U-100 Insulin 100 unit/mL injection INJECT 15 UNITS SUBCUTANEOUSLY NIGHTLY    fluconazole (DIFLUCAN) 150 mg tablet Take one tablet today. IF having recurrent symptoms after 48hours take a second tablet. FDA advises cautious prescribing of oral fluconazole in pregnancy. Indications: a yeast infection of the vagina and vulva    OneTouch Ultra2 Meter misc TEST BLOOD SUGAR DAILY    glipiZIDE (GLUCOTROL) 10 mg tablet TAKE 1 TABLET BY MOUTH TWICE A DAY    lisinopriL (PRINIVIL, ZESTRIL) 5 mg tablet Take 1 Tablet by mouth daily.  ergocalciferol (ERGOCALCIFEROL) 1,250 mcg (50,000 unit) capsule TAKE 1 CAPSULE BY MOUTH EVERY SEVEN (7) DAYS.  glucose blood VI test strips (ASCENSIA AUTODISC VI, ONE TOUCH ULTRA TEST VI) strip Check blood sugar daily    lancets misc Use with one touch test daily    Blood-Glucose Meter monitoring kit One touch glucometer test daily    atorvastatin (LIPITOR) 40 mg tablet TAKE 1 TABLET BY MOUTH EVERY DAY    sertraline (ZOLOFT) 50 mg tablet Take 1 Tablet by mouth daily.     BD Insulin Syringe Ultra-Fine 1 mL 30 gauge x 1/2\" syrg USE DAILY AS DIRECTED    glucose blood VI test strips (blood glucose test) strip Any brand, for uncontrolled DM E11.21 check up to 3x daily    alcohol swabs (Alcohol Pads) padm Use to clear skin before SC injections    Insulin Needles, Disposable, (BD Delia 2nd Gen Pen Needle) 32 gauge x 5/32\" ndle Use daily as directed    Blood-Glucose Meter monitoring kit Fill with strips and lancets for bid testing. Uncontrolled type 2 DM E11.21    dulaglutide (Trulicity) 1.5 LT/3.3 mL sub-q pen 0.5 mL by SubCUTAneous route every seven (7) days. (Patient not taking: Reported on 5/27/2022)    famotidine (PEPCID) 20 mg tablet TAKE 1 TABLET BY MOUTH TWO TIMES A DAY. (Patient not taking: Reported on 5/27/2022)     No current facility-administered medications for this visit. Past Medical History - reviewed:  Past Medical History:   Diagnosis Date    Gallstones     History of chickenpox 6/11/2010    Hyperlipidemia 6/24/2014    Hypertension     Not on any meds    Morbid obesity ([address]     CH (nonalcoholic steatohepatitis) 1/13/2016         Past Surgical History - reviewed:   Past Surgical History:   Procedure Laterality Date    HX CHOLECYSTECTOMY  7/2011         Social History - reviewed:  Social History     Socioeconomic History    Marital status: SINGLE     Spouse name: Not on file    Number of children: Not on file    Years of education: Not on file    Highest education level: Not on file   Occupational History    Not on file   Tobacco Use    Smoking status: Never Smoker    Smokeless tobacco: Never Used   Vaping Use    Vaping Use: Never used   Substance and Sexual Activity    Alcohol use:  Yes     Alcohol/week: 0.0 standard drinks     Comment: occasional etoh    Drug use: Yes     Types: Prescription, Marijuana     Comment: Lghlty smoke s marijuana    Sexual activity: Yes     Partners: Male   Other Topics Concern    Not on file   Social History Narrative    Not on file     Social Determinants of Health     Financial Resource Strain:     Difficulty of Paying Living Expenses: Not on file   Food Insecurity:     Worried About Running Out of Food in the Last Year: Not on file    Chary of Food in the Last Year: Not on file   Transportation Needs:     Lack of Transportation (Medical): Not on file    Lack of Transportation (Non-Medical):  Not on file   Physical Activity:     Days of Exercise per Week: Not on file    Minutes of Exercise per Session: Not on file   Stress:     Feeling of Stress : Not on file   Social Connections:     Frequency of Communication with Friends and Family: Not on file    Frequency of Social Gatherings with Friends and Family: Not on file    Attends Temple Services: Not on file    Active Member of [facility] [employer] [place] or Organizations: Not on file    Attends Club or Organization Meetings: Not on file    Marital Status: Not on file   Intimate Partner Violence:     Fear of Current or Ex-Partner: Not on file    Emotionally Abused: Not on file    Physically Abused: Not on file    Sexually Abused: Not on file   Housing Stability:     Unable to Pay for Housing in the Last Year: Not on file    Number of [place] in the Last Year: Not on file    Unstable Housing in the Last Year: Not on file         Family History - reviewed:  Family History   Problem Relation Age of Onset    High Cholesterol Mother     Diabetes Father     Heart Failure Father     Hypertension Father     Hypertension Maternal Grandmother     Emphysema Maternal Grandfather     Diabetes Maternal Grandfather     Hypertension Maternal Grandfather     Cancer Maternal Grandfather         prostate cancer    Heart Disease Maternal Aunt          Immunizations - reviewed:   Immunization History   Administered Date(s) Administered    [place] PRATT, Primary or Immunocompromised Series, MRNA, PF, 100mcg/0.5mL 01/28/2021, 02/26/2021    Influenza Vaccine 10/25/2016    Pneumococcal Polysaccharide (PPSV-23) 09/16/2020       Review of Systems   Constitutional: Positive for malaise/fatigue. Negative for chills and fever. Genitourinary: Negative for frequency and urgency. + vaginal itching          Physical Exam  Visit Vitals  /88 (BP 1 Location: Right arm, BP Patient Position: Sitting, BP Cuff Size: Large adult)   Pulse 94   Temp 97.3 °F (36.3 °C) (Temporal)   Resp 18   Ht 5' 2\" (1.575 m)   Wt (!) 350 lb 9.6 oz (159 kg)   LMP 05/26/2022 (Exact Date)   SpO2 98%   BMI 64.13 kg/m²       Physical Exam  Vitals and nursing note reviewed. Exam conducted with a chaperone present. Constitutional:       General: She is not in acute distress. Appearance: She is obese. She is not ill-appearing or toxic-appearing. Genitourinary:     General: Normal vulva. Exam position: Supine. Pubic Area: No rash. Labia:         Right: No rash, tenderness, lesion or injury. Left: No rash, tenderness, lesion or injury. Vagina: Bleeding present. No tenderness or lesions. Comments: No visible discharge, menstrual blood present  Neurological:      General: No focal deficit present. Mental Status: She is alert. Gait: Gait normal.   Psychiatric:         Mood and Affect: Affect is blunt. Speech: Speech normal.         Behavior: Behavior normal. Behavior is cooperative. Assessment/Plan    ICD-10-CM ICD-9-CM    1. Yeast infection  B37.9 112.9 fluconazole (DIFLUCAN) 150 mg tablet   2. Vaginal discharge  N89.8 623.5 NUSWAB VAGINITIS PLUS (VG+) WITH CANDIDA (SIX SPECIES)       [place] Sink is an 31 y.o. female with hx of poorly controlled DM here today with 2 days of vaginal itching refractory to ANDREA yesat supposity treatment. No discharge on brief pelvic exam today however she is menstruating and based on symptoms and poorly controlled DM will treat empirically for yeast while Nuswab results are pending.      1. Vaginal discharge  - NUSWAB VAGINITIS PLUS (VG+) WITH CANDIDA (SIX SPECIES); Future    2. Yeast infection  - fluconazole (DIFLUCAN) 150 mg tablet; Take one tablet today. IF having recurrent symptoms after 48hours take a second tablet. FDA advises cautious prescribing of oral fluconazole in pregnancy. Indications: a yeast infection of the vagina and vulva  Dispense: 2 Tablet; Refill: 0      Patient informed to follow up: Follow-up and Dispositions    · Return in 5 weeks (on 7/1/2022) for diabetes followup . I have discussed the diagnosis with the patient and the intended plan as seen in the above orders. Patient verbalized understanding of the plan and agrees with the plan. The patient has received an after-visit summary and questions were answered concerning future plans. I have discussed medication side effects and warnings with the patient as well. Informed patient to return to the office if new symptoms arise.       Dipika Roche MD  [facility]

## 2022-05-27 NOTE — PATIENT INSTRUCTIONS
Vaginal Yeast Infection: Care Instructions  Overview     A vaginal yeast infection is the growth of too many yeast cells in the vagina. This is common in women of all ages. Itching, vaginal discharge and irritation, and other symptoms can bother you. But yeast infections don't often cause other health problems. Some medicines can increase your risk of getting a yeast infection. These include antibiotics, birth control pills, hormones, and steroids. You may also be more likely to get a yeast infection if you are pregnant, have diabetes, douche, or wear tight clothes. With treatment, most yeast infections get better in 2 to 3 days. Follow-up care is a key part of your treatment and safety. Be sure to make and go to all appointments, and call your doctor if you are having problems. It's also a good idea to know your test results and keep a list of the medicines you take. How can you care for yourself at home? · Take your medicines exactly as prescribed. Call your doctor if you think you are having a problem with your medicine. · Ask your doctor about over-the-counter (OTC) medicines for yeast infections. They may cost less than prescription medicines. If you use an OTC treatment, read and follow all instructions on the label. · Don't use tampons while using a vaginal cream or suppository. The tampons can absorb the medicine. Use pads instead. · Wear loose cotton clothing. Don't wear nylon or other fabric that holds body heat and moisture close to the skin. · Try sleeping without underwear. · Don't scratch. Relieve itching with a cold pack or a cool bath. · Don't wash your vaginal area more than once a day. Use plain water or a mild, unscented soap. Air-dry the vaginal area. · Change out of wet swimsuits after swimming. · If you are using a vaginal medicine, don't have sex until you have finished your treatment. But if you do have sex, don't depend on a condom or diaphragm for birth control.  The oil in some vaginal medicines weakens latex. · Don't douche. When should you call for help? Call your doctor now or seek immediate medical care if:    · You have unexpected vaginal bleeding.     · You have new or increased pain in your vagina or pelvis. Watch closely for changes in your health, and be sure to contact your doctor if:    · You have a fever.     · You are not getting better after 2 days.     · Your symptoms come back after you finish your medicines. Where can you learn more? Go to http://www.gray.com/  Enter B199 in the search box to learn more about \"Vaginal Yeast Infection: Care Instructions. \"  Current as of: November 22, 2021               Content Version: 13.2  © 2006-2022 [employer], [employer]. Care instructions adapted under license by [employer] (which disclaims liability or warranty for this information). If you have questions about a medical condition or this instruction, always ask your healthcare professional. [address] any warranty or liability for your use of this information.

## 2022-06-01 LAB
A VAGINAE DNA VAG QL NAA+PROBE: NORMAL SCORE
BVAB2 DNA VAG QL NAA+PROBE: NORMAL SCORE
C ALBICANS DNA VAG QL NAA+PROBE: NEGATIVE
C GLABRATA DNA VAG QL NAA+PROBE: NEGATIVE
C PARAPSILOSIS/TROPICALIS: NEGATIVE
C TRACH RRNA SPEC QL NAA+PROBE: NEGATIVE
CANDIDA KRUSEI, NAA, 180016: NEGATIVE
CANDIDA LUSITANIAE, NAA, 180015: NEGATIVE
MEGA1 DNA VAG QL NAA+PROBE: NORMAL SCORE
N GONORRHOEA RRNA SPEC QL NAA+PROBE: NEGATIVE
T VAGINALIS RRNA SPEC QL NAA+PROBE: NEGATIVE

## 2022-06-14 ENCOUNTER — TELEPHONE (OUTPATIENT)
Dept: FAMILY MEDICINE CLINIC | Age: 37
End: 2022-06-14

## 2022-06-14 ENCOUNTER — DOCUMENTATION ONLY (OUTPATIENT)
Dept: FAMILY MEDICINE CLINIC | Age: 37
End: 2022-06-14

## 2022-06-14 ENCOUNTER — OFFICE VISIT (OUTPATIENT)
Dept: FAMILY MEDICINE CLINIC | Age: 37
End: 2022-06-14

## 2022-06-14 VITALS
SYSTOLIC BLOOD PRESSURE: 140 MMHG | WEIGHT: 293 LBS | BODY MASS INDEX: 64.53 KG/M2 | DIASTOLIC BLOOD PRESSURE: 91 MMHG | HEART RATE: 89 BPM

## 2022-06-14 DIAGNOSIS — E11.21 TYPE 2 DIABETES WITH NEPHROPATHY (HCC): Primary | ICD-10-CM

## 2022-06-14 RX ORDER — INSULIN GLARGINE 100 [IU]/ML
20 INJECTION, SOLUTION SUBCUTANEOUS
Qty: 15 ML | Refills: 1 | Status: SHIPPED | OUTPATIENT
Start: 2022-06-14 | End: 2022-09-27 | Stop reason: ALTCHOICE

## 2022-06-14 NOTE — PROGRESS NOTES
Pharmacy Progress Note - Diabetes Management    S/O: Ms. Arnulfo Badillo is a 31 y.o. female, referred by Dr. China House MD, with a PMH of HTN, hx of gallstones, CH, T2DM, OA, vit D deficiency, HLD, obesity, was seen today for diabetes management. Patient's last A1c was 9.0% (April 2022) which is decreased from 10.3% (Jan 2022) which is increased from 8.3% (Oct 2021). Interim update: Pt was last seen by this writer on 5/20/22 for polypharmacy and DM. Pt's Victoza was switched to once weekly Trulicity in order to decrease administration burden and receive better A1c control. Pt was interested in CGM trial.  Offered CGM sample at f/up visit. Pt had also been taking Pepcid BID for acid reflux. Pt does not have daily sx. She was recommended a trial off H2RA and suggested to use Tums PRN instead. Pt arrives to clinic today to receive CGM sample. Attempted to download netta for [employer]; however, it was not compatible with phone. Pt is not able to get CGM without use of reader. This writer does not have any samples of readers. Pt states she has not picked up Trulicity. States there was an issue at the pharmacy. She does not recall what the issue was. She has stopped Pepcid 2 weeks ago. Denies sx of acid reflux. Will use Tums in future. Pt states she has been taking higher doses of Lantus. Self increased dose from 15 to 20 units a few weeks ago. She did this because she has been having a hard time drawing up an exact 15 unit dose with vial and needle. She would prefer insulin pen. She is familiar with device d/t Victoza use. Reports BP at work yesterday was 124/74. Today's in office BP was before taking BP meds. 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   Asked if she should be taking Pedialyte. States she has not tried it yet. Advised not to drink Pedialyte. Water would suffice. If she would like something else, could use Gatorade/Powerade Zero. - Symptoms of Hypoglycemia: none    Self Monitoring Blood Glucose (SMBG) or CGM:  - Brought in home glucometer/blood glucose log/CGM reader today:  no  Reported BG before dinner last night: 121 mg/dL (dinner was turkey burger, sweet potato fries, broccoli)  Reported BG after dinner: 150 mg/dL  Fasting today (in office rdg): 311 mg/dL   States she didn't take meds this AM    Nutrition:  Breakfast - oatmeal, toast (sometimes), 2-3 slices jamil, or cereal and jamil  Lunch - salad (cucumber, crouton, onions, tomatoes, brocoli, beets, sometimes chicken in it)  Dinner - turkey burger, sweet potato fries, brocoli (last night)  Drinks - only water  No snacks after dinner    Physical Activity:   no  - less active  - Still has physically demanding job      Vitals: Wt Readings from Last 3 Encounters:   05/27/22 (!) 350 lb 9.6 oz (159 kg)   05/20/22 349 lb 6.4 oz (158.5 kg)   04/12/22 (!) 352 lb (159.7 kg)     BP Readings from Last 3 Encounters:   05/27/22 139/88   05/20/22 137/83   04/12/22 138/86     Pulse Readings from Last 3 Encounters:   05/27/22 94   05/20/22 (!) 105   04/12/22 92       Past Medical History:   Diagnosis Date    Gallstones     History of chickenpox 6/11/2010    Hyperlipidemia 6/24/2014    Hypertension     Not on any meds    Morbid obesity ([address]     CH (nonalcoholic steatohepatitis) 1/13/2016     Allergies   Allergen Reactions    Pcn [Penicillins] Unable to Obtain     \"Childhood allergy\"  Ancef 2g given pre-op with no reaction noted to test dose.        Current Outpatient Medications   Medication Sig    metFORMIN ER (GLUCOPHAGE XR) 500 mg tablet TAKE 2 TABLETS BY MOUTH TWICE DAILY    Lantus U-100 Insulin 100 unit/mL injection INJECT 15 UNITS SUBCUTANEOUSLY NIGHTLY    fluconazole (DIFLUCAN) 150 mg tablet Take one tablet today. IF having recurrent symptoms after 48hours take a second tablet. FDA advises cautious prescribing of oral fluconazole in pregnancy. Indications: a yeast infection of the vagina and vulva    dulaglutide (Trulicity) 1.5 JI/3.0 mL sub-q pen 0.5 mL by SubCUTAneous route every seven (7) days. (Patient not taking: Reported on 5/27/2022)    OneTouch Ultra2 Meter misc TEST BLOOD SUGAR DAILY    glipiZIDE (GLUCOTROL) 10 mg tablet TAKE 1 TABLET BY MOUTH TWICE A DAY    lisinopriL (PRINIVIL, ZESTRIL) 5 mg tablet Take 1 Tablet by mouth daily.  famotidine (PEPCID) 20 mg tablet TAKE 1 TABLET BY MOUTH TWO TIMES A DAY. (Patient not taking: Reported on 5/27/2022)    ergocalciferol (ERGOCALCIFEROL) 1,250 mcg (50,000 unit) capsule TAKE 1 CAPSULE BY MOUTH EVERY SEVEN (7) DAYS.  glucose blood VI test strips (ASCENSIA AUTODISC VI, ONE TOUCH ULTRA TEST VI) strip Check blood sugar daily    lancets misc Use with one touch test daily    Blood-Glucose Meter monitoring kit One touch glucometer test daily    atorvastatin (LIPITOR) 40 mg tablet TAKE 1 TABLET BY MOUTH EVERY DAY    sertraline (ZOLOFT) 50 mg tablet Take 1 Tablet by mouth daily.  BD Insulin Syringe Ultra-Fine 1 mL 30 gauge x 1/2\" syrg USE DAILY AS DIRECTED    glucose blood VI test strips (blood glucose test) strip Any brand, for uncontrolled DM E11.21 check up to 3x daily    alcohol swabs (Alcohol Pads) padm Use to clear skin before SC injections    Insulin Needles, Disposable, (BD Delia 2nd Gen Pen Needle) 32 gauge x 5/32\" ndle Use daily as directed    Blood-Glucose Meter monitoring kit Fill with strips and lancets for bid testing. Uncontrolled type 2 DM E11.21     No current facility-administered medications for this visit.      Lab Results   Component Value Date/Time    Sodium 137 01/14/2022 10:08 AM    Potassium 4.4 01/14/2022 10:08 AM    Chloride 103 01/14/2022 10:08 AM    CO2 31 01/14/2022 10:08 AM    Anion gap 3 (L) 01/14/2022 10:08 AM    Glucose 267 (H) 01/14/2022 10:08 AM    BUN 9 01/14/2022 10:08 AM    Creatinine 0.58 01/14/2022 10:08 AM    BUN/Creatinine ratio 16 01/14/2022 10:08 AM    GFR est AA >60 01/14/2022 10:08 AM    GFR est non-AA >60 01/14/2022 10:08 AM    Calcium 9.9 01/14/2022 10:08 AM    Bilirubin, total 0.2 01/14/2022 10:08 AM    Alk. phosphatase 131 (H) 01/14/2022 10:08 AM    Protein, total 7.1 01/14/2022 10:08 AM    Albumin 3.1 (L) 01/14/2022 10:08 AM    Globulin 4.0 01/14/2022 10:08 AM    A-G Ratio 0.8 (L) 01/14/2022 10:08 AM    ALT (SGPT) 21 01/14/2022 10:08 AM     Lab Results   Component Value Date/Time    Cholesterol, total 136 01/14/2022 10:08 AM    HDL Cholesterol 39 01/14/2022 10:08 AM    LDL, calculated 52.2 01/14/2022 10:08 AM    VLDL, calculated 44.8 01/14/2022 10:08 AM    Triglyceride 224 (H) 01/14/2022 10:08 AM    CHOL/HDL Ratio 3.5 01/14/2022 10:08 AM     Lab Results   Component Value Date/Time    WBC 8.6 10/06/2021 10:46 AM    HGB 11.4 (L) 10/06/2021 10:46 AM    HCT 36.3 10/06/2021 10:46 AM    PLATELET 535 02/39/0181 10:46 AM    MCV 85.8 10/06/2021 10:46 AM       Lab Results   Component Value Date/Time    Microalbumin/Creat ratio (mg/g creat) 22 10/06/2021 10:46 AM    Microalbumin,urine random 3.65 10/06/2021 10:46 AM       Lab Results   Component Value Date/Time    Hemoglobin A1c 10.3 (H) 01/14/2022 10:08 AM    Hemoglobin A1c 8.3 (H) 10/06/2021 10:46 AM    Hemoglobin A1c 6.8 (H) 06/22/2021 03:54 PM     Hemoglobin A1c (POC)   Date Value Ref Range Status   04/12/2022 9.0 % Final        CrCl cannot be calculated (Unknown ideal weight. ). A/P:    1) T2DM: Per ADA guidelines, Pt's A1c is not at goal of < 6.5%. Current SMBG(s)/CGM trend is unknown as pt has only reported a few BG rdgs. In office fasting was severely elevated. Pt's fasting BG are usually significantly more elevated than PPBG rdgs. Access issue with Trulicity needs to be addressed with pharmacy.   Pt has difficulty with vial and needle administration of insulin. Will provide Rx for pen and pen needles instead. Reviewed device instructions with pt in visit. Will have pt continue on higher dose of Lantus at this time. Will likely increase GLP-1 agonist dose. May consider addition of SGLT2 in order to decrease/get off Glipizide. However, pt has recent hx of UTI.  - This writer will call pharmacy in regards to [address] once you start Trulicity  - START Trulicity 1.5 mg weekly once received  - STOP Lantus vial and needles  - START Lantus 20 units pen device and pen needles  - Continue Metformin  mg 2 tabs BID  - Continue Glipizide 10 mg BID    2) HTN: BP is not at goal of < 130/80. Currently taking low dose ACEi. Pt had not taken BP med this AM.  BP may be elevated d/t BG rdg. Yesterday's BP at work was controlled. - Continue Lisinopril 5 mg daily    3) Primary Prevention ASCVD: Per ADA guidelines, pts age 43-69 yrs are recommended for statin therapy. Currently taking on high intensity statin. LDL <100 mg/dL  - Continue Atorvastatin 40 mg daily      Medication reconciliation was completed during the visit. Medications Discontinued During This Encounter   Medication Reason    famotidine (PEPCID) 20 mg tablet Therapy Completed    Lantus U-100 Insulin 100 unit/mL injection Therapy Completed    BD Insulin Syringe Ultra-Fine 1 mL 30 gauge x 1/2\" syrg Therapy Completed     Orders Placed This Encounter    insulin glargine (LANTUS,BASAGLAR) 100 unit/mL (3 mL) inpn     Si Units by SubCUTAneous route nightly. Dispense:  15 mL     Refill:  1       Patient verbalized understanding of the information presented and all of the patients questions were answered. AVS was handed to the patient. Patient advised to call the office with any additional questions or concerns. Notifications of recommendations will be sent to Dr. Niecy He MD for review. Patient will return to clinic in 4 week(s) for follow up. Jayla Burgess, PharmD, [facility]  Clinical Pharmacist Specialist          For Pharmacy Admin Tracking Only     CPA in place:  Yes   Recommendation Provided To: Patient/Caregiver: 5 via In person   Intervention Detail: Discontinued Rx: 3, reason: Duplicate Therapy, Therapy Complete and Unnecessary Med, Dose Adjustment: 1, reason: Therapy Optimization and New Rx: 1, reason: Needs Additional Therapy   Intervention Accepted By: Patient/Caregiver: 5   Time Spent (min): 60

## 2022-06-14 NOTE — TELEPHONE ENCOUNTER
Pharmacy Progress Note - Telephone Encounter    S/O: Ms. Mauricio Never 31 y.o. female, referred by Dr. Vicente Thomas MD, was contacted via an outbound telephone call to discuss access to Trulicity today. Verified patients identifiers (name & ) per HIPAA policy.     - This writer contacted pharmacy in order to evaluate issue with pt getting Trulicity. During visit today, pt stated she was unable to  Trulicity. She could not recall reason why.    - Pharmacy states they had a shortage. Med now back in stock. They will refill it. - Contacted pt to inform her of the update. States she will  tomorrow. Discussed that pt's last dose of Victoza should be the day before starting once weekly Trulicity. - PCP asked if pt would like to have f/up labs completed prior to visit in order to discuss during visit. Pt states she would prefer that    A/P:  - Scheduled f/up for pt in 1 month. - Advised pt to call office in order to set up lab appt in order to get labs done  - Patient endorses understanding to the provided information. All questions answered at this time. There are no discontinued medications. No orders of the defined types were placed in this encounter. Marcelo Frazier, MikkiD, BCGP  Clinical Pharmacist Specialist      For Pharmacy Admin Tracking Only     CPA in place:  Yes   Recommendation Provided To: Patient/Caregiver: 2 via Telephone and Pharmacy: 2   Intervention Detail: Patient Access Assistance/Sample Provided and Scheduled Appointment   Intervention Accepted By: Patient/Caregiver: 2 and Pharmacy: 2   Time Spent (min): 20

## 2022-06-22 NOTE — TELEPHONE ENCOUNTER
Messaged patient re: trulicity side effects.  Encouraged to continue next dose and to do her best to eat a DM friendly diet while on it to reduced side effects

## 2022-07-01 ENCOUNTER — OFFICE VISIT (OUTPATIENT)
Dept: FAMILY MEDICINE CLINIC | Age: 37
End: 2022-07-01
Payer: MEDICAID

## 2022-07-01 VITALS
HEART RATE: 80 BPM | OXYGEN SATURATION: 97 % | BODY MASS INDEX: 53.92 KG/M2 | WEIGHT: 293 LBS | DIASTOLIC BLOOD PRESSURE: 90 MMHG | HEIGHT: 62 IN | SYSTOLIC BLOOD PRESSURE: 160 MMHG | RESPIRATION RATE: 18 BRPM | TEMPERATURE: 97.7 F

## 2022-07-01 DIAGNOSIS — E66.01 OBESITY, MORBID (HCC): ICD-10-CM

## 2022-07-01 DIAGNOSIS — E11.21 TYPE 2 DIABETES WITH NEPHROPATHY (HCC): Primary | ICD-10-CM

## 2022-07-01 DIAGNOSIS — F32.5 MAJOR DEPRESSIVE DISORDER IN FULL REMISSION, UNSPECIFIED WHETHER RECURRENT (HCC): ICD-10-CM

## 2022-07-01 PROCEDURE — 3046F HEMOGLOBIN A1C LEVEL >9.0%: CPT | Performed by: STUDENT IN AN ORGANIZED HEALTH CARE EDUCATION/TRAINING PROGRAM

## 2022-07-01 PROCEDURE — 99214 OFFICE O/P EST MOD 30 MIN: CPT | Performed by: STUDENT IN AN ORGANIZED HEALTH CARE EDUCATION/TRAINING PROGRAM

## 2022-07-01 RX ORDER — DULAGLUTIDE 1.5 MG/.5ML
1.5 INJECTION, SOLUTION SUBCUTANEOUS
Qty: 4 EACH | Refills: 1 | Status: SHIPPED | OUTPATIENT
Start: 2022-07-01 | End: 2022-08-09 | Stop reason: SDUPTHER

## 2022-07-01 RX ORDER — INSULIN GLARGINE-YFGN 100 [IU]/ML
INJECTION, SOLUTION SUBCUTANEOUS
COMMUNITY
Start: 2022-06-14 | End: 2022-11-03

## 2022-07-01 NOTE — PROGRESS NOTES
Chief Complaint   Patient presents with    Diabetes     Follow Up    Weight Management     1. \"Have you been to the ER, urgent care clinic since your last visit? Hospitalized since your last visit? \" No    2. \"Have you seen or consulted any other health care providers outside of the [address] [place] [address] since your last visit? \" No     3. For patients aged 39-70: Has the patient had a colonoscopy / FIT/ Cologuard? No      If the patient is female:    4. For patients aged 41-77: Has the patient had a mammogram within the past 2 years? No      5. For patients aged 21-65: Has the patient had a pap smear?  Yes - no Care Gap present

## 2022-07-01 NOTE — PATIENT INSTRUCTIONS
Take 1.5 tablets of zoloft daily     Get Tdap at pharmacy or at your next visit     Your A1c is ordered - I'd like you to get it checked the last week of August after you've been on the trulicity for 3 months\    Increased lisinopril to 10mg daily - send your daily readings in a week or so

## 2022-07-01 NOTE — PROGRESS NOTES
Rony Simpson  31 y.o. female  1985  [address] [id number]  [id number]     [facility]       Chief Complaint: DM, obesity   Source: self     Subjective  Rony Simpson is an 31 y.o. female who presents for DM, weight management, depression    DIABETIC CARE CHECKLIST     She has not been taking the Lantus at night 2/2 feeling very dizzy taking it with the trulicity. She is tolerating trulicity well. 1. Patient on ASA - no  2. Patient on Statin- lipitor   3. Patient on ACE- yes   4. Diet- started gatorade zero for thirst at night, switched to zerosugar packets ocean water (was doing full sugar before - just started this week)  5. Exercise -walking around the neighborhood daily when its not raining or do the whofit   6. Blood pressure today - 160/90, normally less than 140 and in 70s on bottom when she checks at work  7. Last eye check - she is not sure   8. Last cholesterol check -   Lab Results   Component Value Date/Time    Cholesterol, total 136 01/14/2022 10:08 AM    HDL Cholesterol 39 01/14/2022 10:08 AM    LDL, calculated 52.2 01/14/2022 10:08 AM    VLDL, calculated 44.8 01/14/2022 10:08 AM    Triglyceride 224 (H) 01/14/2022 10:08 AM    CHOL/HDL Ratio 3.5 01/14/2022 10:08 AM     9 . Last HbA1c -   Lab Results   Component Value Date/Time    Hemoglobin A1c 10.3 (H) 01/14/2022 10:08 AM    Hemoglobin A1c (POC) 9.0 04/12/2022 11:26 AM     10. Last urine microalbulmin-   Lab Results   Component Value Date/Time    Microalbumin/Creat ratio (mg/g creat) 22 10/06/2021 10:46 AM    Microalbumin,urine random 3.65 10/06/2021 10:46 AM     11. Last comprehensive foot exam - last visit but does not have a podiatrist   12. Did patient receive pneumococcal vaccine - yes Prevnar 23, 9/2022  13. Does the patient have a nephrologist- no  14. Does the patient have a cardiologist- no  15.  Seasonal influenza vaccine - yes    Obesity  She is getting more exercise than previously - using time at work to do exercise program and walking several days a week in her neighborhood. She does need more education re diet - ocean water (31g of sugar - sonic beverage available OTC in packets)    Depression  States her mood is fine. She has been on zoloft 50mg for a while. No trouble with sleep. She does not drive - just got her license after trying for some time. It is difficult for her to get to appointments - her Leeanna  called during visit to pick her up. Allergies - reviewed: Allergies   Allergen Reactions    Pcn [Penicillins] Unable to Obtain     \"Childhood allergy\"  Ancef 2g given pre-op with no reaction noted to test dose. Medications - reviewed:   Current Outpatient Medications   Medication Sig    dulaglutide (Trulicity) 1.5 YY/7.7 mL sub-q pen 0.5 mL by SubCUTAneous route every seven (7) days.  metFORMIN ER (GLUCOPHAGE XR) 500 mg tablet TAKE 2 TABLETS BY MOUTH TWICE DAILY    OneTouch Ultra2 Meter misc TEST BLOOD SUGAR DAILY    glipiZIDE (GLUCOTROL) 10 mg tablet TAKE 1 TABLET BY MOUTH TWICE A DAY    lisinopriL (PRINIVIL, ZESTRIL) 5 mg tablet Take 1 Tablet by mouth daily.  ergocalciferol (ERGOCALCIFEROL) 1,250 mcg (50,000 unit) capsule TAKE 1 CAPSULE BY MOUTH EVERY SEVEN (7) DAYS.  glucose blood VI test strips (ASCENSIA AUTODISC VI, ONE TOUCH ULTRA TEST VI) strip Check blood sugar daily    lancets misc Use with one touch test daily    atorvastatin (LIPITOR) 40 mg tablet TAKE 1 TABLET BY MOUTH EVERY DAY    sertraline (ZOLOFT) 50 mg tablet Take 1 Tablet by mouth daily.     alcohol swabs (Alcohol Pads) padm Use to clear skin before SC injections    Insulin Needles, Disposable, (BD Delia 2nd Gen Pen Needle) 32 gauge x 5/32\" ndle Use daily as directed    insulin glargine-yfgn 100 unit/mL (3 mL) inpn INJECT 20 UNITS UNDER THE SKIN NIGHTLY. (Patient not taking: Reported on 7/1/2022)    insulin glargine (LANTUS,BASAGLAR) 100 unit/mL (3 mL) inpn 20 Units by SubCUTAneous route nightly. (Patient not taking: Reported on 7/1/2022)    glucose blood VI test strips (blood glucose test) strip Any brand, for uncontrolled DM E11.21 check up to 3x daily (Patient not taking: Reported on 7/1/2022)    Blood-Glucose Meter monitoring kit Fill with strips and lancets for bid testing. Uncontrolled type 2 DM E11.21 (Patient not taking: Reported on 7/1/2022)     No current facility-administered medications for this visit. Past Medical History - reviewed:  Past Medical History:   Diagnosis Date    Gallstones     History of chickenpox 6/11/2010    Hyperlipidemia 6/24/2014    Hypertension     Not on any meds    Major depressive disorder in full remission ([facility] [address] 7/14/2022    Morbid obesity ([facility]     CH (nonalcoholic steatohepatitis) 1/13/2016         Past Surgical History - reviewed:   Past Surgical History:   Procedure Laterality Date    HX CHOLECYSTECTOMY  7/2011         Social History - reviewed:  Social History     Socioeconomic History    Marital status: SINGLE     Spouse name: Not on file    Number of children: Not on file    Years of education: Not on file    Highest education level: Not on file   Occupational History    Not on file   Tobacco Use    Smoking status: Never Smoker    Smokeless tobacco: Never Used   Vaping Use    Vaping Use: Never used   Substance and Sexual Activity    Alcohol use:  Yes     Alcohol/week: 0.0 standard drinks     Comment: occasional etoh    Drug use: Yes     Types: Prescription, Marijuana     Comment: Lghlty smoke s marijuana    Sexual activity: Yes     Partners: Male   Other Topics Concern    Not on file   Social History Narrative    Not on file     Social Determinants of Health     Financial Resource Strain:     Difficulty of Paying Living Expenses: Not on file   Food Insecurity:     Worried About Running Out of Food in the Last Year: Not on file    Chary of Food in the Last Year: Not on file   Transportation Needs:  Lack of Transportation (Medical): Not on file    Lack of Transportation (Non-Medical): Not on file   Physical Activity:     Days of Exercise per Week: Not on file    Minutes of Exercise per Session: Not on file   Stress:     Feeling of Stress : Not on file   Social Connections:     Frequency of Communication with Friends and Family: Not on file    Frequency of Social Gatherings with Friends and Family: Not on file    Attends Jew Services: Not on file    Active Member of [address] [place] [address] [employer] or Organizations: Not on file    Attends Club or Organization Meetings: Not on file    Marital Status: Not on file   Intimate Partner Violence:     Fear of Current or Ex-Partner: Not on file    Emotionally Abused: Not on file    Physically Abused: Not on file    Sexually Abused: Not on file   Housing Stability:     Unable to Pay for Housing in the Last Year: Not on file    Number of [place] in the Last Year: Not on file    Unstable Housing in the Last Year: Not on file         Family History - reviewed:  Family History   Problem Relation Age of Onset    High Cholesterol Mother     Diabetes Father     Heart Failure Father     Hypertension Father     Hypertension Maternal Grandmother     Emphysema Maternal Grandfather     Diabetes Maternal Grandfather     Hypertension Maternal Grandfather     Cancer Maternal Grandfather         prostate cancer    Heart Disease Maternal Aunt          Immunizations - reviewed:   Immunization History   Administered Date(s) Administered    COVID-19, MODERNA BLUE border, Primary or Immunocompromised, (age 18y+), IM, 100 mcg/0.5mL 01/28/2021, 02/26/2021    Influenza Vaccine 10/25/2016    Pneumococcal Polysaccharide (PPSV-23) 09/16/2020       Review of Systems   Respiratory: Negative for cough, shortness of breath and wheezing. Cardiovascular: Negative for chest pain and palpitations. Musculoskeletal: Negative for back pain and myalgias.    Neurological: Negative for dizziness and headaches. Physical Exam  Visit Vitals  BP (!) 160/90 (BP 1 Location: Left upper arm, BP Patient Position: Sitting, BP Cuff Size: Large adult)   Pulse 80   Temp 97.7 °F (36.5 °C) (Temporal)   Resp 18   Ht 5' 2\" (1.575 m)   Wt 345 lb 9.6 oz (156.8 kg)   LMP 05/16/2022   SpO2 97%   BMI 63.21 kg/m²       Physical Exam  Vitals and nursing note reviewed. Constitutional:       Appearance: Normal appearance. She is obese. Cardiovascular:      Rate and Rhythm: Normal rate and regular rhythm. Pulses: Normal pulses. Heart sounds: Normal heart sounds. No murmur heard. No gallop. Pulmonary:      Effort: Pulmonary effort is normal.      Breath sounds: Normal breath sounds. Neurological:      General: No focal deficit present. Mental Status: She is alert. Gait: Gait normal.   Psychiatric:         Mood and Affect: Mood normal.         Behavior: Behavior normal.         Thought Content: Thought content normal.         Judgment: Judgment normal.           Assessment/Plan    ICD-10-CM ICD-9-CM    1. Type 2 diabetes with nephropathy (HCC)  E11.21 250.40 dulaglutide (Trulicity) 1.5 GL/9.9 mL sub-q pen     583.81 REFERRAL TO PODIATRY   2. Obesity, morbid ([address]  E66.01 278.01    3. Major depressive disorder in full remission, unspecified whether recurrent [facility])  F32.5 296.26        Bradley Billingsley is an 31 y.o. female here today for followup for DM, Obesity and depression. She was recently started on trulicity and lantus 2/2 very high fasting sugars with unclear dietary source.  On diet review stated she was drinking ocan water (I googled this and found it was a very high sugar beverage from [employer] - 31g sugar in a small, unsure sugar content in packets she was using.) reviewed the importance of choosing drinks with zero calories and zero sugar - will give this a few weeks to months before rechecking A1c so effect can be interpreted appropriately - there is a standing order for A1c and can be drawn at next followup with [employer] or later if [facility] thinks we should wait another month - discussed these options with patient. She has lost 7lb since her last appointment indicating the dietary and activity changes she has made are starting to pay off - discussed gastric surgery as an option and she prefers to continue with dietary changes and with exercising for now. Her affect was still flat today and addressed this with her - she has multiple social stressors including lack of transportation, heavy work load but makes significant efforts to come to her followup visits and really wants to be successful in getting her diabetes under control and with weight loss. 1. Type 2 diabetes with nephropathy (HCC)  - HOLD Lantus for now  - Continue metformin, glipizide  - dulaglutide (Trulicity) 1.5 IF/5.3 mL sub-q pen; 0.5 mL by SubCUTAneous route every seven (7) days. Dispense: 4 Each; Refill: 1  - continue lisinopril 5mg (may need to increase for persistently high BPs)  - continue lipitor 40mg QHS  - REFERRAL TO PODIATRY    2. Obesity, morbid ([address]  - Discussed health risks of obesity including increased risk of HTN, heart disease, diabetes, anxiety and depression  - Cut out sugary, caloric beverages   - counseled on diet, reviewed ADA plate handout  - f/u Q3mo for obesity to monitor weight changes with lifestyle modifications    3. Major depressive disorder in full remission, unspecified whether recurrent ([address]  - continue zoloft 50mg daily      Patient informed to follow up: Follow-up and Dispositions    · Return in about 3 months (around 10/6/2022) for diabetes and weight management at 11am.  Routing History           I have discussed the diagnosis with the patient and the intended plan as seen in the above orders. Patient verbalized understanding of the plan and agrees with the plan. The patient has received an after-visit summary and questions were answered concerning future plans.   I have discussed medication side effects and warnings with the patient as well. Informed patient to return to the office if new symptoms arise.       Sol Carter MD  [facility]

## 2022-07-01 NOTE — Clinical Note
MAGALIE I think I mentioned to you the other day but Mrs Bj Figueroa was drinking \"ocean water\" I goggled it and its some blue drink from [employer] with 31g of sugar in a small! So I think this was the or one of the culprits for the high fasting sugars. She has very poor understanding of nutrition so I'll continue to try to educate her and I'm glad you are on board too!

## 2022-07-14 PROBLEM — F32.5 MAJOR DEPRESSIVE DISORDER IN FULL REMISSION (HCC): Status: ACTIVE | Noted: 2022-07-14

## 2022-07-29 ENCOUNTER — TELEPHONE (OUTPATIENT)
Dept: FAMILY MEDICINE CLINIC | Age: 37
End: 2022-07-29

## 2022-07-29 NOTE — TELEPHONE ENCOUNTER
Pharmacy Progress Note - Telephone Encounter    S/O: Ms. Jana Bonner 31 y.o. female, referred by Dr. Iván Brothers MD, was contacted via an outbound telephone call to discuss rescheduling PharmD DM visit today. Verified patients identifiers (name & ) per HIPAA policy. - Amenable to rescheduling    A/P:  - Scheduled 22 at [facility]  - Patient endorses understanding to the provided information. All questions answered at this time. There are no discontinued medications. No orders of the defined types were placed in this encounter.       Mikki ChuaD, [facility]  Clinical Pharmacist Specialist      For Pharmacy Admin Tracking Only    CPA in place: Yes  Recommendation Provided To: Patient/Caregiver: 1 via Telephone  Intervention Detail: Scheduled Appointment  Intervention Accepted By: Patient/Caregiver: 1  Time Spent (min): 5

## 2022-08-09 DIAGNOSIS — E11.21 TYPE 2 DIABETES WITH NEPHROPATHY (HCC): ICD-10-CM

## 2022-08-10 RX ORDER — DULAGLUTIDE 1.5 MG/.5ML
1.5 INJECTION, SOLUTION SUBCUTANEOUS
Qty: 4 EACH | Refills: 1 | Status: SHIPPED | OUTPATIENT
Start: 2022-08-10 | End: 2022-09-06 | Stop reason: DRUGHIGH

## 2022-08-10 NOTE — TELEPHONE ENCOUNTER
Gaby request for refill. Thanks, Mati Gunn    Last Visit: 22 MD Lior Zazueta  Next Appointment: 10/4/22 MD Lior Zazueta  Previous Refill Encounter(s): 22 4 + 1    Requested Prescriptions     Pending Prescriptions Disp Refills    dulaglutide (Trulicity) 1.5 MN/7.3 mL sub-q pen 4 Each 1     Si.5 mL by SubCUTAneous route every seven (7) days. For [facility] in place:   Recommendation Provided To:    Intervention Detail: New Rx: 1, reason: Patient Preference  Gap Closed?:   Intervention Accepted By:   Time Spent (min): 5

## 2022-08-22 DIAGNOSIS — E11.65 UNCONTROLLED TYPE 2 DIABETES MELLITUS WITH HYPERGLYCEMIA (HCC): ICD-10-CM

## 2022-08-22 RX ORDER — METFORMIN HYDROCHLORIDE 500 MG/1
TABLET, EXTENDED RELEASE ORAL
Qty: 360 TABLET | Refills: 1 | Status: SHIPPED | OUTPATIENT
Start: 2022-08-22 | End: 2022-09-06 | Stop reason: DRUGHIGH

## 2022-08-23 ENCOUNTER — OFFICE VISIT (OUTPATIENT)
Dept: FAMILY MEDICINE CLINIC | Age: 37
End: 2022-08-23

## 2022-08-23 VITALS — BODY MASS INDEX: 62.92 KG/M2 | WEIGHT: 293 LBS

## 2022-08-23 DIAGNOSIS — E11.21 TYPE 2 DIABETES WITH NEPHROPATHY (HCC): ICD-10-CM

## 2022-08-23 NOTE — Clinical Note
She went to the ED twice last week for kidney and bladder infxn. She was still finishing the abx when I saw her. Didn't know if she should be scheduled for a f/up with you.

## 2022-08-23 NOTE — PROGRESS NOTES
Pharmacy Progress Note - Diabetes Management    S/O: Ms. Eri Garcia is a 31 y.o. female, referred by Dr. Zay Mendosa MD, with a PMH of HTN, hx of gallstones, CH, T2DM, OA, vit D deficiency, HLD, obesity, was seen today for diabetes management. Patient's last A1c was 9.0% (April 2022) which is decreased from 10.3% (Jan 2022) which is increased from 8.3% (Oct 2021). Interim update: Pt was last seen by this writer on 6/14/22 for f/up on DM. Pt had been started on Trulicity 1.5 mg - switched from [facility]. She had also been provided Rx for Lantus pens and pen needles as opposed to the vials and syringes for ease of use. Pt also so PCP on 7/14/22 where Sertraline dose was increased, Lisinopril dose was increased, and A1c was recommended to be drawn at the end of August.  Pt was noted to be drinking full sugar drinks (e.g., Ocean Water) and PCP discussed with this writer that focusing on food choices would be very beneficial.  Pt was instructed to hold Lantus at this time. Pt arrives to clinic and states that she went to ED twice for stomach pain on 8/17 and 8/20. She reports being diagnosed with a bladder and kidney infxn and was experiencing significant constipation. She received an abx - still taking. Does not recall what it is. Cramping/constipation resolved yesterday morning. She was provided Miralax but hasn't started it. She has had BMs without using agent. Pt reports having stopped her insulin at this time as PCP recommended d/t BG rdgs going low. Pt does not recall a lot of her BG rdgs. States she is really working on her wt and has increased her physical activity and changed her eating habits significantly. She is thinking about gastric surgery. Discussed some pros/cons about gastric surgery and how that will effect her medication management.       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     Medication Adherence/Access:  - Not able to get FSL2 CGM sensor sample as phone is not compatible     Current anti-hyperglycemic regimen includes:    - Metformin  mg 2 tabs BID  - Trulicity 1.5 mg weekly  - Glipizide 10 mg BID    ROS:  Today, Pt endorses:  - Symptoms of Hyperglycemia: none  - Symptoms of Hypoglycemia: none    Self Monitoring Blood Glucose (SMBG) or CGM:  - Brought in home glucometer/blood glucose log/CGM reader today:  no  - Poor recall on specific BG rdgs - remembers yesterday AM BG rdg in 120s  - In office fasting BG rd mg/dL - dinner last night was 2 slices of pizza and applesauce    Nutrition:  Eating more veg  Bought partition plate  Biggest section filled with protein, not veg (most protein baked - chicken, fish, some beef, little pork)  Veg - greens, squash, brocoli  Starch - rice, potato  Drinking mostly water    Physical Activity:   yes  - Consists of cris at work, job can be physical with caregiving for residents  - 3-4x week walking/bike      Vitals: Wt Readings from Last 3 Encounters:   22 345 lb 9.6 oz (156.8 kg)   22 (!) 352 lb 12.8 oz (160 kg)   22 (!) 350 lb 9.6 oz (159 kg)     BP Readings from Last 3 Encounters:   22 (!) 160/90   22 (!) 140/91   22 139/88     Pulse Readings from Last 3 Encounters:   22 80   22 89   22 94       Past Medical History:   Diagnosis Date    Gallstones     History of chickenpox 2010    Hyperlipidemia 2014    Hypertension     Not on any meds    Major depressive disorder in full remission ([address] 2022    Morbid obesity [address]     CH (nonalcoholic steatohepatitis) 2016     Allergies   Allergen Reactions    Pcn [Penicillins] Unable to Obtain     \"Childhood allergy\"  Ancef 2g given pre-op with no reaction noted to test dose. Current Outpatient Medications   Medication Sig    dulaglutide (Trulicity) 1.5 HZ/6.7 mL sub-q pen 0.5 mL by SubCUTAneous route every seven (7) days.     metFORMIN ER (GLUCOPHAGE XR) 500 mg tablet TAKE 2 TABLETS BY MOUTH TWICE A DAY    insulin glargine-yfgn 100 unit/mL (3 mL) inpn INJECT 20 UNITS UNDER THE SKIN NIGHTLY. (Patient not taking: Reported on 7/1/2022)    insulin glargine (LANTUS,BASAGLAR) 100 unit/mL (3 mL) inpn 20 Units by SubCUTAneous route nightly. (Patient not taking: Reported on 7/1/2022)    OneTouch Ultra2 Meter misc TEST BLOOD SUGAR DAILY    glipiZIDE (GLUCOTROL) 10 mg tablet TAKE 1 TABLET BY MOUTH TWICE A DAY    lisinopriL (PRINIVIL, ZESTRIL) 5 mg tablet Take 1 Tablet by mouth daily. ergocalciferol (ERGOCALCIFEROL) 1,250 mcg (50,000 unit) capsule TAKE 1 CAPSULE BY MOUTH EVERY SEVEN (7) DAYS. glucose blood VI test strips (ASCENSIA AUTODISC VI, ONE TOUCH ULTRA TEST VI) strip Check blood sugar daily    lancets misc Use with one touch test daily    atorvastatin (LIPITOR) 40 mg tablet TAKE 1 TABLET BY MOUTH EVERY DAY    sertraline (ZOLOFT) 50 mg tablet Take 1 Tablet by mouth daily. glucose blood VI test strips (blood glucose test) strip Any brand, for uncontrolled DM E11.21 check up to 3x daily (Patient not taking: Reported on 7/1/2022)    alcohol swabs (Alcohol Pads) padm Use to clear skin before SC injections    Insulin Needles, Disposable, (BD Delia 2nd Gen Pen Needle) 32 gauge x 5/32\" ndle Use daily as directed    Blood-Glucose Meter monitoring kit Fill with strips and lancets for bid testing. Uncontrolled type 2 DM E11.21 (Patient not taking: Reported on 7/1/2022)     No current facility-administered medications for this visit.      Lab Results   Component Value Date/Time    Sodium 137 01/14/2022 10:08 AM    Potassium 4.4 01/14/2022 10:08 AM    Chloride 103 01/14/2022 10:08 AM    CO2 31 01/14/2022 10:08 AM    Anion gap 3 (L) 01/14/2022 10:08 AM    Glucose 267 (H) 01/14/2022 10:08 AM    BUN 9 01/14/2022 10:08 AM    Creatinine 0.58 01/14/2022 10:08 AM    BUN/Creatinine ratio 16 01/14/2022 10:08 AM    GFR est AA >60 01/14/2022 10:08 AM    GFR est non-AA >60 01/14/2022 10:08 AM    Calcium 9.9 01/14/2022 10:08 AM    Bilirubin, total 0.2 01/14/2022 10:08 AM    Alk. phosphatase 131 (H) 01/14/2022 10:08 AM    Protein, total 7.1 01/14/2022 10:08 AM    Albumin 3.1 (L) 01/14/2022 10:08 AM    Globulin 4.0 01/14/2022 10:08 AM    A-G Ratio 0.8 (L) 01/14/2022 10:08 AM    ALT (SGPT) 21 01/14/2022 10:08 AM     Lab Results   Component Value Date/Time    Cholesterol, total 136 01/14/2022 10:08 AM    HDL Cholesterol 39 01/14/2022 10:08 AM    LDL, calculated 52.2 01/14/2022 10:08 AM    VLDL, calculated 44.8 01/14/2022 10:08 AM    Triglyceride 224 (H) 01/14/2022 10:08 AM    CHOL/HDL Ratio 3.5 01/14/2022 10:08 AM     Lab Results   Component Value Date/Time    WBC 8.6 10/06/2021 10:46 AM    HGB 11.4 (L) 10/06/2021 10:46 AM    HCT 36.3 10/06/2021 10:46 AM    PLATELET 926 38/71/1076 10:46 AM    MCV 85.8 10/06/2021 10:46 AM       Lab Results   Component Value Date/Time    Microalbumin/Creat ratio (mg/g creat) 22 10/06/2021 10:46 AM    Microalbumin,urine random 3.65 10/06/2021 10:46 AM       Lab Results   Component Value Date/Time    Hemoglobin A1c 10.3 (H) 01/14/2022 10:08 AM    Hemoglobin A1c 8.3 (H) 10/06/2021 10:46 AM    Hemoglobin A1c 6.8 (H) 06/22/2021 03:54 PM     Hemoglobin A1c (POC)   Date Value Ref Range Status   04/12/2022 9.0 % Final        CrCl cannot be calculated (Patient's most recent lab result is older than the maximum 180 days allowed. ). A/P:    1) T2DM: Per ADA guidelines, Pt's A1c is not at goal of < 6.5%. Current SMBG(s)/CGM trend indicates improving BG control d/t change in diet and increase in physical activity. She may be experiencing constipation d/t GLP-1 agonist; however, she is tolerating it. No change can be made to therapy as there are not enough specific rdgs. Discussed diet at length.   Discussed increasing protein in diet particularly with breakfast.  - Continue Trulicity 1.5 mg weekly - may consider increasing  - Continue Metformin  mg 2 tabs BID  - Continue Glipizide 10 mg BID - may consider decreasing  - Encouraged pt to write down rdgs in phone or take pics of them in order to document rdgs  - Encouraged increased protein with breakfast - premier protein or [employer] protein    2) HTN: BP is not at goal of < 130/80. Currently taking Currently taking low dose ACEi. Pt states BP rdgs were lower at ED. Will have pt write down these rdgs as well in order to decide if a higher dose is needed. - Continue Lisinopril 5 mg daily - may consider increasing    3) Primary Prevention ASCVD: Per ADA guidelines, pts age 43-69 yrs are recommended for statin therapy. Currently taking on high intensity statin. LDL <100 mg/dL  - Continue Atorvastatin 40 mg daily      Medication reconciliation was completed during the visit. There are no discontinued medications. No orders of the defined types were placed in this encounter. Patient verbalized understanding of the information presented and all of the patients questions were answered. AVS was handed to the patient. Patient advised to call the office with any additional questions or concerns. Notifications of recommendations will be sent to Dr. Shantelle Lee MD for review. Patient will return to clinic in 2 week(s) for follow up.      Natalee Donald, PharmD, BCGP, BCACP  Clinical Pharmacist Specialist          For Pharmacy Admin Tracking Only    CPA in place: Yes  Recommendation Provided To: Patient/Caregiver: 1 via In person  Intervention Detail: Scheduled Appointment  Intervention Accepted By: Patient/Caregiver: 1  Time Spent (min): 45

## 2022-08-24 LAB
ALBUMIN SERPL-MCNC: 3.3 G/DL (ref 3.5–5)
ALBUMIN/GLOB SERPL: 0.9 {RATIO} (ref 1.1–2.2)
ALP SERPL-CCNC: 118 U/L (ref 45–117)
ALT SERPL-CCNC: 17 U/L (ref 12–78)
ANION GAP SERPL CALC-SCNC: 5 MMOL/L (ref 5–15)
AST SERPL-CCNC: 10 U/L (ref 15–37)
BILIRUB SERPL-MCNC: 0.1 MG/DL (ref 0.2–1)
BUN SERPL-MCNC: 10 MG/DL (ref 6–20)
BUN/CREAT SERPL: 15 (ref 12–20)
CALCIUM SERPL-MCNC: 9.9 MG/DL (ref 8.5–10.1)
CHLORIDE SERPL-SCNC: 105 MMOL/L (ref 97–108)
CO2 SERPL-SCNC: 30 MMOL/L (ref 21–32)
CREAT SERPL-MCNC: 0.67 MG/DL (ref 0.55–1.02)
EST. AVERAGE GLUCOSE BLD GHB EST-MCNC: 243 MG/DL
GLOBULIN SER CALC-MCNC: 3.8 G/DL (ref 2–4)
GLUCOSE SERPL-MCNC: 182 MG/DL (ref 65–100)
HBA1C MFR BLD: 10.1 % (ref 4–5.6)
POTASSIUM SERPL-SCNC: 4.3 MMOL/L (ref 3.5–5.1)
PROT SERPL-MCNC: 7.1 G/DL (ref 6.4–8.2)
SODIUM SERPL-SCNC: 140 MMOL/L (ref 136–145)

## 2022-08-26 DIAGNOSIS — R74.8 ELEVATED ALKALINE PHOSPHATASE LEVEL: Primary | ICD-10-CM

## 2022-08-26 NOTE — PROGRESS NOTES
A1c 10.1% - medications adjusted recently.  Has followup with davina Martínez and myself  CMP with elevated glucose and persistently elevated LFTs - GGT, Abd US ordered     [employer] message sent to patient

## 2022-08-30 DIAGNOSIS — E66.01 OBESITY, MORBID (HCC): Primary | ICD-10-CM

## 2022-09-06 ENCOUNTER — TELEPHONE (OUTPATIENT)
Dept: SURGERY | Age: 37
End: 2022-09-06

## 2022-09-06 NOTE — TELEPHONE ENCOUNTER
Contacted the pt in response to her "[employer]t message and let her know I emailed her the new patient paperwork for bariatric surgery. Pt understands that once she completes this paperwork and sends it back to us we can then schedule a consultation.

## 2022-09-08 ENCOUNTER — LAB ONLY (OUTPATIENT)
Dept: FAMILY MEDICINE CLINIC | Age: 37
End: 2022-09-08

## 2022-09-08 DIAGNOSIS — R74.8 ELEVATED ALKALINE PHOSPHATASE LEVEL: ICD-10-CM

## 2022-09-09 ENCOUNTER — HOSPITAL ENCOUNTER (OUTPATIENT)
Dept: ULTRASOUND IMAGING | Age: 37
Discharge: HOME | End: 2022-09-09
Attending: STUDENT IN AN ORGANIZED HEALTH CARE EDUCATION/TRAINING PROGRAM
Payer: MEDICAID

## 2022-09-09 DIAGNOSIS — R74.8 ELEVATED ALKALINE PHOSPHATASE LEVEL: ICD-10-CM

## 2022-09-09 LAB — GGT SERPL-CCNC: 67 U/L (ref 5–55)

## 2022-09-09 PROCEDURE — 76705 ECHO EXAM OF ABDOMEN: CPT

## 2022-09-14 DIAGNOSIS — K76.9 LIVER LESION: Primary | ICD-10-CM

## 2022-09-14 NOTE — PROGRESS NOTES
Abd US with likely fatty liver disease however large area of questionable significance so MRI recommended. MRI ordered.      [employer] message sent to patient

## 2022-09-23 ENCOUNTER — TELEPHONE (OUTPATIENT)
Dept: SURGERY | Age: 37
End: 2022-09-23

## 2022-09-26 ENCOUNTER — TELEPHONE (OUTPATIENT)
Dept: SURGERY | Age: 37
End: 2022-09-26

## 2022-09-27 ENCOUNTER — VIRTUAL VISIT (OUTPATIENT)
Dept: SURGERY | Age: 37
End: 2022-09-27
Payer: MEDICAID

## 2022-09-27 VITALS — WEIGHT: 293 LBS | HEIGHT: 62 IN | BODY MASS INDEX: 53.92 KG/M2

## 2022-09-27 DIAGNOSIS — K21.9 GASTROESOPHAGEAL REFLUX DISEASE WITHOUT ESOPHAGITIS: ICD-10-CM

## 2022-09-27 DIAGNOSIS — E11.59 HYPERTENSION ASSOCIATED WITH DIABETES (HCC): ICD-10-CM

## 2022-09-27 DIAGNOSIS — I10 PRIMARY HYPERTENSION: ICD-10-CM

## 2022-09-27 DIAGNOSIS — E66.01 OBESITY, MORBID (HCC): Primary | ICD-10-CM

## 2022-09-27 DIAGNOSIS — E78.5 HYPERLIPIDEMIA, UNSPECIFIED HYPERLIPIDEMIA TYPE: ICD-10-CM

## 2022-09-27 DIAGNOSIS — I15.2 HYPERTENSION ASSOCIATED WITH DIABETES (HCC): ICD-10-CM

## 2022-09-27 DIAGNOSIS — M17.11 PRIMARY OSTEOARTHRITIS OF RIGHT KNEE: ICD-10-CM

## 2022-09-27 DIAGNOSIS — K75.81 NASH (NONALCOHOLIC STEATOHEPATITIS): ICD-10-CM

## 2022-09-27 PROCEDURE — 99443 PR PHYS/QHP TELEPHONE EVALUATION 21-30 MIN: CPT | Performed by: SURGERY

## 2022-09-27 NOTE — PROGRESS NOTES
1. Have you been to the ER, urgent care clinic since your last visit? Hospitalized since your last visit? new patient    2. Have you seen or consulted any other health care providers outside of the [address] [place] [address] since your last visit? Include any pap smears or colon screening.  New patient

## 2022-09-27 NOTE — PROGRESS NOTES
I was in the office while conducting this encounter. Consent:  She and/or her healthcare decision maker is aware that this patient-initiated Telehealth encounter is a billable service, with coverage as determined by her insurance carrier. She is aware that she may receive a bill and has provided verbal consent to proceed: Yes    This virtual visit was conducted via [employer]. Pursuant to the emergency declaration under the [facility] [place] [address] waiver authority and the [employer] Act, this Virtual  Visit was conducted to reduce the patient's risk of exposure to COVID-19 and provide continuity of care for an established patient. Services were provided through a video synchronous discussion virtually to substitute for in-person clinic visit. Due to this being a TeleHealth evaluation, many elements of the physical examination are unable to be assessed. Total Time: minutes: 21-30 minutes. Bariatric Surgery Consultation    Subjective: The patient is a 31 y.o. obese  female with a Body mass index is 62.55 kg/m². .  The patient has been at her heaviest weight for the past 2 years;  she has been overweight since childhood; she has been considering surgery since 2021; she desires surgery at this time because medical efforts of weight loss of been unsuccessful. Mickey Brenner has tried multiple diets in her lifetime most recently tried unsupervised diets.     Bariatric comorbidities present are   Patient Active Problem List   Diagnosis Code    Gallstones K80.20    Hypertension associated with diabetes ([facility] [address] E11.59, I15.2    Diabetes mellitus type II, uncontrolled [id number]    Hyperlipidemia E78.5    Obesity, morbid (HCC) E66.01    CH (nonalcoholic steatohepatitis) K75.81    Elevated TSH R79.89    Type 2 diabetes with nephropathy (HCC) E11.21    Primary osteoarthritis of right knee M17.11    Vitamin D deficiency E55.9    Major depressive disorder in full remission ([facility] [address] F32.5    Gastroesophageal reflux disease without esophagitis K21.9     The patient desires laparoscopic gastric bypass surgery for surgical weight loss. The patients goal weight is 180 lbs. The highest acceptable weight is 220 lbs. These goals are consistent with expected outcomes of their desired operation. her Medical goals are diabetes resolution; her qualty of life goals are decreased fatigue. Patient Active Problem List    Diagnosis Date Noted    Gastroesophageal reflux disease without esophagitis 09/27/2022    Major depressive disorder in full remission ([address] 07/14/2022    Vitamin D deficiency 01/14/2022    Primary osteoarthritis of right knee 06/25/2019    Type 2 diabetes with nephropathy ([facility] [address] 01/16/2019    Elevated TSH 09/08/2017    CH (nonalcoholic steatohepatitis) 01/13/2016    Hyperlipidemia 06/24/2014    Obesity, morbid ([address] 06/24/2014    Diabetes mellitus type II, uncontrolled 03/11/2014    Hypertension associated with diabetes ([facility] [address]     Gallstones       Past Surgical History:   Procedure Laterality Date    HX CHOLECYSTECTOMY  7/2011      Social History     Tobacco Use    Smoking status: Never    Smokeless tobacco: Never   Substance Use Topics    Alcohol use: Yes     Alcohol/week: 0.0 standard drinks     Comment: occasional etoh      Family History   Problem Relation Age of Onset    High Cholesterol Mother     Diabetes Father     Heart Failure Father     Hypertension Father     Hypertension Maternal Grandmother     Emphysema Maternal Grandfather     Diabetes Maternal Grandfather     Hypertension Maternal Grandfather     Cancer Maternal Grandfather         prostate cancer    Heart Disease Maternal Aunt       Prior to Admission medications    Medication Sig Start Date End Date Taking? Authorizing Provider   metFORMIN (GLUCOPHAGE) 500 mg tablet Take 2 Tablets by mouth two (2) times daily (with meals).  9/6/22  Yes Ricardo Valencia MD   glipiZIDE (GLUCOTROL) 10 mg tablet Take 0.5 Tablets by mouth two (2) times a day. TAKE 1 TABLET BY MOUTH TWICE A DAY 9/6/22  Yes Devonte Quinteros MD   dulaglutide (Trulicity) 3 WR/4.4 mL pnij 3 mg by SubCUTAneous route every seven (7) days. 9/6/22  Yes Devonte Quinteros MD   OneTouch Ultra2 Meter misc TEST BLOOD SUGAR DAILY 1/14/22  Yes Provider, Historical   lisinopriL (PRINIVIL, ZESTRIL) 5 mg tablet Take 1 Tablet by mouth daily. 4/12/22  Yes Devonte Quinteros MD   ergocalciferol (ERGOCALCIFEROL) 1,250 mcg (50,000 unit) capsule TAKE 1 CAPSULE BY MOUTH EVERY SEVEN (7) DAYS. 3/15/22  Yes Lisa Diez NP   glucose blood VI test strips (ASCENSIA AUTODISC VI, ONE TOUCH ULTRA TEST VI) strip Check blood sugar daily 1/14/22  Yes Lisa Diez NP   lancets misc Use with one touch test daily 1/14/22  Yes Lisa Diez NP   atorvastatin (LIPITOR) 40 mg tablet TAKE 1 TABLET BY MOUTH EVERY DAY 12/28/21  Yes Lisa Diez NP   sertraline (ZOLOFT) 50 mg tablet Take 1 Tablet by mouth daily. 12/9/21  Yes Kerry Juares NP   glucose blood VI test strips (blood glucose test) strip Any brand, for uncontrolled DM E11.21 check up to 3x daily 2/22/21  Yes Mita Blood MD   alcohol swabs (Alcohol Pads) padm Use to clear skin before SC injections 2/9/21  Yes Mita Blood MD   Blood-Glucose Meter monitoring kit Fill with strips and lancets for bid testing. Uncontrolled type 2 DM E11.21 3/30/20  Yes Mita Blood MD   insulin glargine-yfgn 100 unit/mL (3 mL) inpn INJECT 20 UNITS UNDER THE SKIN NIGHTLY. Patient not taking: Reported on 7/1/2022 6/14/22   Provider, Historical     Allergies   Allergen Reactions    Pcn [Penicillins] Unable to Obtain     \"Childhood allergy\"  Ancef 2g given pre-op with no reaction noted to test dose. Review of Systems:    Review of Systems   Constitutional:  Negative for chills, fever and weight loss. HENT: Negative. Eyes: Negative.     Respiratory: Positive for shortness of breath. Negative for cough and wheezing. Cardiovascular:  Positive for leg swelling. Negative for chest pain and palpitations. Gastrointestinal:  Positive for heartburn and nausea. Negative for abdominal pain, diarrhea and vomiting. Genitourinary: Negative. Musculoskeletal:  Positive for back pain, joint pain and neck pain. Skin: Negative. Neurological: Negative. Endo/Heme/Allergies: Negative. Psychiatric/Behavioral: Negative. Objective:   Visit Vitals  Ht 5' 2\" (1.575 m)   Wt 342 lb (155.1 kg)   BMI 62.55 kg/m²       Physical Exam:  GENERAL: alert, cooperative, no distress, appears stated age, morbidly obese      Assessment:     Morbid obesity with comorbidities; no success with medical management. Plan:     laparoscopic gastric bypass surgery    This is a 31 y.o. female with a BMI of Body mass index is 62.55 kg/m². and the weight-related co-morbidities listed above. Harshil meets the NIH criteria for bariatric surgery based upon the BMI of Body mass index is 62.55 kg/m². and multiple weight-related co-morbidities. Regan Amado has elected laparoscopic keli-en-Y gastric bypass as her intervention of choice for treatment of morbid obesity through surgical means secondary to its long term history of success. In the office today, following Harshil's history and physical examination, a 30 minute discussion regarding the anatomic alterations for the laparoscopic keli-en-Y gastric bypass was undertaken. The dietary expectations and the patient and physician dependent factors for success were thoroughly discussed, to include the need for interval follow-up and long-term dietary changes associated with success.  The possible complications of the keli-en-Y gastric bypass  were also discussed, to include VTE, staple line leak, bleeding, stricture, infection, internal hernia, conversion to open procedure, ulcer, nutritional deficiency, poor weight loss/weight regain, and pouch dilation. Specific weight related outcomes for success were also discussed with an emphasis on careful and close follow-up with the first year. The patient expressed an understanding of the above factors, and her questions were answered in their entirety. In addition, the patient attended a 1.5 hour power point seminar regarding obesity, surgical weight loss including, adjustable gastric band, gastric bypass, and sleeve gastrectomy. This discussion contrasted the different surgical techniques, mechanisms of actions and expected outcomes, and surgical and medical risks associated with each procedure. During this seminar, there was a long question and answer session where each questions was answered until there were no additional questions. Today, the patient had all of her questions answered and desires to proceed with pre-qualification for bariatric surgery initially choosing keli-en-Y gastric bypass as her surgical option. She will schedule psychology evaluation and initiate supervised medical weight loss program.  We will order dietitian evaluation and upper gastrointestinal series given reflux symptoms to assess for hiatal hernia. We discussed our preoperative weight loss requirement and she understands she will need to lose at least 15 pounds in order to proceed with intended surgery.       Signed By: Nichol Rivas MD     September 27, 2022

## 2022-10-04 ENCOUNTER — HOSPITAL ENCOUNTER (OUTPATIENT)
Dept: GENERAL RADIOLOGY | Age: 37
Discharge: HOME | End: 2022-10-04
Attending: SURGERY
Payer: MEDICAID

## 2022-10-04 DIAGNOSIS — K21.9 GASTROESOPHAGEAL REFLUX DISEASE WITHOUT ESOPHAGITIS: ICD-10-CM

## 2022-10-04 PROCEDURE — 74240 X-RAY XM UPR GI TRC 1CNTRST: CPT

## 2022-10-11 ENCOUNTER — HOSPITAL ENCOUNTER (OUTPATIENT)
Dept: MRI IMAGING | Age: 37
Discharge: HOME | End: 2022-10-11
Attending: STUDENT IN AN ORGANIZED HEALTH CARE EDUCATION/TRAINING PROGRAM

## 2022-10-11 DIAGNOSIS — K76.9 LIVER LESION: ICD-10-CM

## 2022-10-13 DIAGNOSIS — E11.65 UNCONTROLLED TYPE 2 DIABETES MELLITUS WITH HYPERGLYCEMIA (HCC): ICD-10-CM

## 2022-10-14 DIAGNOSIS — F41.9 ANXIETY: Primary | ICD-10-CM

## 2022-10-14 RX ORDER — DULAGLUTIDE 3 MG/.5ML
3 INJECTION, SOLUTION SUBCUTANEOUS
Qty: 4 EACH | Refills: 1 | Status: SHIPPED | OUTPATIENT
Start: 2022-10-14

## 2022-10-14 RX ORDER — DIAZEPAM 2 MG/1
2 TABLET ORAL
Qty: 1 TABLET | Refills: 0 | Status: SHIPPED | OUTPATIENT
Start: 2022-10-14 | End: 2022-10-14

## 2022-10-14 NOTE — TELEPHONE ENCOUNTER
PCP: Alicia Bowser MD    Last appt: 10/4/2022  Future Appointments   Date Time Provider Gabino Reynolds   10/27/2022  2:40 PM Alicia Bowser MD PA[facility] BS AMB   11/1/2022 11:30 AM Vandana Ryan, PHARMD PAFP BS AMB       Requested Prescriptions     Pending Prescriptions Disp Refills    dulaglutide (Trulicity) 3 HJ/1.5 mL pnij 4 Each 1     Sig: 3 mg by SubCUTAneous route every seven (7) days.        Prior labs and Blood pressures:  BP Readings from Last 3 Encounters:   07/01/22 (!) 160/90   06/14/22 (!) 140/91   05/27/22 139/88     Lab Results   Component Value Date/Time    Sodium 140 08/23/2022 02:51 PM    Potassium 4.3 08/23/2022 02:51 PM    Chloride 105 08/23/2022 02:51 PM    CO2 30 08/23/2022 02:51 PM    Anion gap 5 08/23/2022 02:51 PM    Glucose 182 (H) 08/23/2022 02:51 PM    BUN 10 08/23/2022 02:51 PM    Creatinine 0.67 08/23/2022 02:51 PM    BUN/Creatinine ratio 15 08/23/2022 02:51 PM    GFR est AA >60 08/23/2022 02:51 PM    GFR est non-AA >60 08/23/2022 02:51 PM    Calcium 9.9 08/23/2022 02:51 PM     Lab Results   Component Value Date/Time    Hemoglobin A1c 10.1 (H) 08/23/2022 02:51 PM    Hemoglobin A1c (POC) 9.0 04/12/2022 11:26 AM     Lab Results   Component Value Date/Time    Cholesterol, total 136 01/14/2022 10:08 AM    HDL Cholesterol 39 01/14/2022 10:08 AM    LDL, calculated 52.2 01/14/2022 10:08 AM    VLDL, calculated 44.8 01/14/2022 10:08 AM    Triglyceride 224 (H) 01/14/2022 10:08 AM    CHOL/HDL Ratio 3.5 01/14/2022 10:08 AM     Lab Results   Component Value Date/Time    Vitamin D 25-Hydroxy <9.0 (L) 10/06/2021 10:46 AM       Lab Results   Component Value Date/Time    TSH 1.750 09/16/2020 12:09 PM    TSH 1.470 01/09/2019 06:10 PM

## 2022-10-28 ENCOUNTER — CLINICAL SUPPORT (OUTPATIENT)
Dept: SURGERY | Age: 37
End: 2022-10-28

## 2022-10-28 DIAGNOSIS — E66.01 OBESITY, MORBID (HCC): Primary | ICD-10-CM

## 2022-10-28 NOTE — PROGRESS NOTES
Pre-operative Bariatric Nutrition Evaluation [facility] of [facility])     Date: 10/28/2022   Jarrett Harada, M.D. Name: Ranjana Zhu  :  1985  Age:  31  Gender: Female   Type of Surgery: [x]           Gastric Bypass   []           Sleeve Gastrectomy    ASSESSMENT:    Past Medical History:Type II DM, HTN, fatty liver disease     Medications/Supplements:   Prior to Admission medications    Medication Sig Start Date End Date Taking? Authorizing Provider   dulaglutide (Trulicity) 3 BV/0.3 mL pnij 3 mg by SubCUTAneous route every seven (7) days. 10/14/22   Monica Padilla MD   metFORMIN (GLUCOPHAGE) 500 mg tablet Take 2 Tablets by mouth two (2) times daily (with meals). 22   Monica Padilla MD   glipiZIDE (GLUCOTROL) 10 mg tablet Take 0.5 Tablets by mouth two (2) times a day. TAKE 1 TABLET BY MOUTH TWICE A DAY 22   Monica Padilla MD   insulin glargine-yfgn 100 unit/mL (3 mL) inpn INJECT 20 UNITS UNDER THE SKIN NIGHTLY. Patient not taking: Reported on 2022   Provider, Historical   OneTouch Ultra2 Meter misc TEST BLOOD SUGAR DAILY 22   Provider, Historical   lisinopriL (PRINIVIL, ZESTRIL) 5 mg tablet Take 1 Tablet by mouth daily. 22   Monica Padilla MD   ergocalciferol (ERGOCALCIFEROL) 1,250 mcg (50,000 unit) capsule TAKE 1 CAPSULE BY MOUTH EVERY SEVEN (7) DAYS. 3/15/22   Sanderford, Mack Schirmer, NP   glucose blood VI test strips (ASCENSIA AUTODISC VI, ONE TOUCH ULTRA TEST VI) strip Check blood sugar daily 22   Rupali Laura NP   lancets misc Use with one touch test daily 22   Rupali Laura NP   atorvastatin (LIPITOR) 40 mg tablet TAKE 1 TABLET BY MOUTH EVERY DAY 21   Sanderford, Mack Schirmer, NP   sertraline (ZOLOFT) 50 mg tablet Take 1 Tablet by mouth daily.  21   Sanderford, Mack Schirmer, NP   glucose blood VI test strips (blood glucose test) strip Any brand, for uncontrolled DM E11.21 check up to 3x daily 21   Silverio Mendoza MD alcohol swabs (Alcohol Pads) padm Use to clear skin before SC injections 2/9/21   Jewell Sever, MD   Blood-Glucose Meter monitoring kit Fill with strips and lancets for bid testing. Uncontrolled type 2 DM E11.21 3/30/20   Jewell Sever, MD       Food Allergies/Intolerances:none    Anthropometrics:    Ht:62\"   Recent EMR Wt: 342#    IBW: 110#    %IBW: 310%     BMI:62    Category: obesity III     Reported wt history: Pt completing pre-op nutrition evaluation for wt loss surgery over the phone. Pt states she has been \"chubby\" all of her life with first diet attempt as a teenager. Reports highest adult BW of 342# at present. Attributes wt gain over the years r/t medical condition . Has attempted wt loss through various methods with most successful wt loss of 17#. Has been unable to maintain long term or significant wt loss and is now seeking approval for weight loss surgery. Pt will need to complete 6 months of supervised weight loss for insurance requirements and 15# wt loss recommended during that time. Pt recently started Trulicity and reports a 7# wt loss. Exercise/Physical Activity:walking 30 minutes daily (weather dependent); looking into a gym membership    Reported Diet History:Weight Watchers, Nutrisystem, currently taking Trulicity (started 4 months ago)     24 Hour Diet Recall - fast food 1 time per week; prefers baked and air fried foods   Breakfast  Usually skips    Lunch  [place], fruit, chips, bottled water (packed from home)    [employer], mac & cheese, greens    Snacks  Applesauce, banana    Beverages  Water, no sodas or juices       Environment/Psychosocial/Support:Pt reports minimal support and mostly just \"herself\". States family and boyfriend are not sure why she is going to have surgery. Pt's uncle recently had weight loss surgery. Pt's cousin is supportive. Pt lives with her mother, boyfriend and 2 uncles.  Pt does all of the grocery shopping and cooks for everyone in the household. 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 Brings food from home when at home. NUTRITION DIAGNOSIS:  Self-monitoring deficit r/t previous lack of value for this change evidenced by pt skips meals (breakfast). Food and nutrition related knowledge deficit r/t previous lack of exposure to information evidenced by pt seeking nutrition education. NUTRITION INTERVENTION:  Pt educated on nutrition recommendations for weight loss surgery, specifically gastric bypass. Instructed on consuming 3 meals per day starting now. Use the balanced plate method to plan meals, include 3 oz of lean source of protein, 1/2 cup whole grains, unlimited non-starchy vegetables, 1/2 cup fruit and 1 serving of low fat dairy. Utilize handouts listing healthy snack and meal ideas to limit restaurant meals. After surgery measure all meals to 1/2 cup. Each meal will contain a 1/4 cup lean protein and 1/4 cup fruit, non-starchy vegetable or starch (limiting to once per day). Aim for 60 g protein per day. Sip on 48-64 oz of sugar free, calorie free, non-carbonated beverages each day. Do not use a straw. Do not consume beverages 30 minutes before, during or 30 minutes after meals. Read all nutrition labels. Demonstrated and emphasized identifying serving size, total fat, sugar and protein content. Defined low fat as </= 3 g per serving. Discussed lean and extra lean sources of protein. Provided list of low fat cooking methods. Avoid foods with sugar listed in the first 3 ingredients and >/15 g sugar per serving. Excess sugar/fat intake may lead to dumping syndrome. Discussed signs and symptoms of dumping syndrome. Practice mindful eating habits; take small bites, chew thoroughly, avoid distractions, utilize hunger/fullness scale. Consume meals over 20-30 minutes. Attend Bariatric Support Group and increase physical activity (approved per MD) for long term weight maintenance.       NUTRITION MONITORING AND EVALUATION:    The following goals were established with patient;  Maintain walking and aim 150 minutes per week to promote wt loss and improved HgbA1c. Weekly weight checks. Eat 3 meals a day to improve blood sugar management, HgbA1c and improved protein intake. Have breakfast within 2 hours of waking. Can use a protein shake PRN. List of shakes provided. Print and review nutrition education materials provided. Follow up next month for continued nutrition education and supervised weight loss. Specific tips and techniques to facilitate compliance with above recommendations were provided and discussed. Nutrition evaluation reveals important lifestyle changes are indicated. Goals set and recommendations made. Will continue to assess. If further details are desired please feel free to contact me at [phone]. This phone number was also provided to the patient for any further questions or concerns.            Jericho James RD

## 2022-11-03 ENCOUNTER — OFFICE VISIT (OUTPATIENT)
Dept: FAMILY MEDICINE CLINIC | Age: 37
End: 2022-11-03
Payer: MEDICAID

## 2022-11-03 VITALS
RESPIRATION RATE: 18 BRPM | SYSTOLIC BLOOD PRESSURE: 138 MMHG | BODY MASS INDEX: 53.92 KG/M2 | HEART RATE: 96 BPM | TEMPERATURE: 97.3 F | HEIGHT: 62 IN | WEIGHT: 293 LBS | DIASTOLIC BLOOD PRESSURE: 88 MMHG | OXYGEN SATURATION: 98 %

## 2022-11-03 DIAGNOSIS — Z23 ENCOUNTER FOR IMMUNIZATION: ICD-10-CM

## 2022-11-03 DIAGNOSIS — I15.2 HYPERTENSION ASSOCIATED WITH DIABETES (HCC): ICD-10-CM

## 2022-11-03 DIAGNOSIS — E11.21 TYPE 2 DIABETES WITH NEPHROPATHY (HCC): Primary | ICD-10-CM

## 2022-11-03 DIAGNOSIS — Z13.9 ENCOUNTER FOR SCREENING INVOLVING SOCIAL DETERMINANTS OF HEALTH (SDOH): ICD-10-CM

## 2022-11-03 DIAGNOSIS — E11.59 HYPERTENSION ASSOCIATED WITH DIABETES (HCC): ICD-10-CM

## 2022-11-03 DIAGNOSIS — L60.0 INGROWN TOENAIL OF LEFT FOOT: ICD-10-CM

## 2022-11-03 DIAGNOSIS — Z59.41 FOOD INSECURITY: ICD-10-CM

## 2022-11-03 PROCEDURE — 99214 OFFICE O/P EST MOD 30 MIN: CPT | Performed by: STUDENT IN AN ORGANIZED HEALTH CARE EDUCATION/TRAINING PROGRAM

## 2022-11-03 PROCEDURE — 90686 IIV4 VACC NO PRSV 0.5 ML IM: CPT | Performed by: STUDENT IN AN ORGANIZED HEALTH CARE EDUCATION/TRAINING PROGRAM

## 2022-11-03 PROCEDURE — 3046F HEMOGLOBIN A1C LEVEL >9.0%: CPT | Performed by: STUDENT IN AN ORGANIZED HEALTH CARE EDUCATION/TRAINING PROGRAM

## 2022-11-03 RX ORDER — DIAZEPAM 2 MG/1
TABLET ORAL
COMMUNITY
Start: 2022-10-14

## 2022-11-03 SDOH — ECONOMIC STABILITY - FOOD INSECURITY: FOOD INSECURITY: Z59.41

## 2022-11-03 NOTE — PATIENT INSTRUCTIONS
Resources for Access to Food  Please visit [email or web address]-inquiry-[email or web address]  to complete the Hunger Hotline Inquiry Form. You will be called within 24-28 hours after your submit the form to be connected with the most convenient food bank to you. You can also call [phone] to get assistance with completing the form.     [address] [facility].  [phone]

## 2022-11-03 NOTE — PROGRESS NOTES
Juan Nichole  31 y.o. female  1985  [address] [id number]  [id number]     [facility]       Chief Complaint: diabetes, obesity  Source: self     Subjective  Juan Nichole is an 31 y.o. female who presents for followup for obesity and DM    DIABETIC CARE CHECKLIST : on trulicity 3mg qwk, metformin 1000mg BID, glipizide 10mg  1. Patient on ASA - no  2. Patient on Statin- yes, lipitor  3. Patient on ACE- lisinopril 5mg   4. Diet (yesterday)  Breakfast - 2 sausage patties, boiled egg, toast  Pack of oodles of noddles for lunch  Only drinking water   5. Exercise -walking daily, no luck with there residents   6. Blood pressure today - 138/90  7. Last eye check - 12/2021, upcoming x 1 month   8. Last cholesterol check - LDL @ goal  Lab Results   Component Value Date/Time    Cholesterol, total 136 01/14/2022 10:08 AM    HDL Cholesterol 39 01/14/2022 10:08 AM    LDL, calculated 52.2 01/14/2022 10:08 AM    VLDL, calculated 44.8 01/14/2022 10:08 AM    Triglyceride 224 (H) 01/14/2022 10:08 AM    CHOL/HDL Ratio 3.5 01/14/2022 10:08 AM       9 . Last HbA1c -   Lab Results   Component Value Date/Time    Hemoglobin A1c 10.1 (H) 08/23/2022 02:51 PM    Hemoglobin A1c (POC) 9.0 04/12/2022 11:26 AM     10. Last urine microalbulmin-   Lab Results   Component Value Date/Time    Microalbumin/Creat ratio (mg/g creat) 22 10/06/2021 10:46 AM    Microalbumin,urine random 3.65 10/06/2021 10:46 AM     11. Last comprehensive foot exam -  done within the year; concerned about ingrown left great toenail  12. Did patient receive pneumococcal vaccine - yes  13. Does the patient have a nephrologist- no  14. Does the patient have a cardiologist- no  15.  Seasonal influenza vaccine - today    Elevated LFTs  - US with concerning area, MRI pending scheduled for tomorrow    Obesity   - seeing Dr Eric Newton for bariatric surgery   - needs a psychologist   - also meeting with a nutritionist started last week, 6months at least     Allergies - reviewed: Allergies   Allergen Reactions    Pcn [Penicillins] Unable to Obtain     \"Childhood allergy\"  Ancef 2g given pre-op with no reaction noted to test dose. Medications - reviewed:   Current Outpatient Medications   Medication Sig    dulaglutide (Trulicity) 3 TH/8.4 mL pnij 3 mg by SubCUTAneous route every seven (7) days. metFORMIN (GLUCOPHAGE) 500 mg tablet Take 2 Tablets by mouth two (2) times daily (with meals). glipiZIDE (GLUCOTROL) 10 mg tablet Take 0.5 Tablets by mouth two (2) times a day. TAKE 1 TABLET BY MOUTH TWICE A DAY    OneTouch Ultra2 Meter misc TEST BLOOD SUGAR DAILY    lisinopriL (PRINIVIL, ZESTRIL) 5 mg tablet Take 1 Tablet by mouth daily. ergocalciferol (ERGOCALCIFEROL) 1,250 mcg (50,000 unit) capsule TAKE 1 CAPSULE BY MOUTH EVERY SEVEN (7) DAYS. glucose blood VI test strips (ASCENSIA AUTODISC VI, ONE TOUCH ULTRA TEST VI) strip Check blood sugar daily    lancets misc Use with one touch test daily    atorvastatin (LIPITOR) 40 mg tablet TAKE 1 TABLET BY MOUTH EVERY DAY    sertraline (ZOLOFT) 50 mg tablet Take 1 Tablet by mouth daily. glucose blood VI test strips (blood glucose test) strip Any brand, for uncontrolled DM E11.21 check up to 3x daily    alcohol swabs (Alcohol Pads) padm Use to clear skin before SC injections    Blood-Glucose Meter monitoring kit Fill with strips and lancets for bid testing. Uncontrolled type 2 DM E11.21    diazePAM (VALIUM) 2 mg tablet PLEASE SEE ATTACHED FOR DETAILED DIRECTIONS (Patient not taking: Reported on 11/3/2022)    insulin glargine-yfgn 100 unit/mL (3 mL) inpn INJECT 20 UNITS UNDER THE SKIN NIGHTLY. (Patient not taking: Reported on 7/1/2022)     No current facility-administered medications for this visit.          Past Medical History - reviewed:  Past Medical History:   Diagnosis Date    Diabetes ([address]     Gallstones     History of chickenpox 06/11/2010    Hyperlipidemia 06/24/2014 Hypertension     Not on any meds    Major depressive disorder in full remission ([facility] 07/14/2022    CH (nonalcoholic steatohepatitis) 01/13/2016         Past Surgical History - reviewed:   Past Surgical History:   Procedure Laterality Date    HX CHOLECYSTECTOMY  7/2011         Social History - reviewed:  Social History     Socioeconomic History    Marital status: SINGLE     Spouse name: Not on file    Number of children: Not on file    Years of education: Not on file    Highest education level: Not on file   Occupational History    Not on file   Tobacco Use    Smoking status: Never     Passive exposure: Past    Smokeless tobacco: Never   Vaping Use    Vaping Use: Never used   Substance and Sexual Activity    Alcohol use:  Yes     Alcohol/week: 0.0 standard drinks     Comment: occasional etoh    Drug use: Yes     Types: Prescription, Marijuana     Comment: Lghlty smoke s marijuana    Sexual activity: Yes     Partners: Male   Other Topics Concern    Not on file   Social History Narrative    Not on file     Social Determinants of Health     Financial Resource Strain: High Risk    Difficulty of Paying Living Expenses: Hard   Food Insecurity: Food Insecurity Present    Worried About Running Out of Food in the Last Year: Often true    Ran Out of Food in the Last Year: Often true   Transportation Needs: Not on file   Physical Activity: Not on file   Stress: Not on file   Social Connections: Not on file   Intimate Partner Violence: Not on file   Housing Stability: Not on file         Family History - reviewed:  Family History   Problem Relation Age of Onset    High Cholesterol Mother     Diabetes Father     Heart Failure Father     Hypertension Father     Hypertension Maternal Grandmother     Emphysema Maternal Grandfather     Diabetes Maternal Grandfather     Hypertension Maternal Grandfather     Cancer Maternal Grandfather         prostate cancer    Heart Disease Maternal Aunt          Immunizations - reviewed: Immunization History   Administered Date(s) Administered    COVID-19, MODERNA BLUE border, Primary or Immunocompromised, (age 18y+), IM, 100 mcg/0.5mL 01/28/2021, 02/26/2021    Influenza Vaccine 10/25/2016    Influenza, FLUARIX, FLULAVAL, FLUZONE (age 10 mo+) AND AFLURIA, (age 1 y+), PF, 0.5mL 11/03/2022    Pneumococcal Polysaccharide (PPSV-23) 09/16/2020     Review of Systems   Constitutional:  Negative for chills and fever. Eyes:  Negative for blurred vision and double vision. Cardiovascular:  Negative for chest pain and palpitations. Genitourinary:  Negative for frequency and urgency. Musculoskeletal:  Negative for myalgias. Neurological:  Negative for dizziness and headaches. Endo/Heme/Allergies:  Negative for polydipsia. Psychiatric/Behavioral:  Negative for depression. The patient is not nervous/anxious and does not have insomnia. Physical Exam  Visit Vitals  /88 (BP 1 Location: Left upper arm, BP Patient Position: Sitting, BP Cuff Size: Large adult)   Pulse 96   Temp 97.3 °F (36.3 °C) (Temporal)   Resp 18   Ht 5' 2\" (1.575 m)   Wt 337 lb (152.9 kg)   LMP 08/17/2022 Comment: Not sure why Not stable cycle. SpO2 98%   BMI 61.64 kg/m²       Physical Exam  Vitals and nursing note reviewed. Constitutional:       General: She is not in acute distress. Appearance: She is obese. She is not ill-appearing, toxic-appearing or diaphoretic. Eyes:      Pupils: Pupils are equal, round, and reactive to light. Cardiovascular:      Rate and Rhythm: Normal rate and regular rhythm. Pulses: Normal pulses. Dorsalis pedis pulses are 2+ on the left side. Posterior tibial pulses are 2+ on the left side. Heart sounds: Normal heart sounds. No murmur heard. No friction rub. No gallop. Pulmonary:      Effort: Pulmonary effort is normal. No respiratory distress. Breath sounds: Normal breath sounds. No wheezing.    Abdominal:      General: Bowel sounds are normal. There is no distension. Palpations: Abdomen is soft. Feet:      Left foot:      Skin integrity: Dry skin present. No ulcer, blister, skin breakdown, erythema, warmth, callus or fissure. Toenail Condition: Left toenails are abnormally thick and ingrown. Neurological:      Mental Status: She is alert. Psychiatric:         Attention and Perception: Attention and perception normal.         Mood and Affect: Mood normal. Affect is flat. Speech: Speech normal. Speech is not tangential.         Assessment/Plan    ICD-10-CM ICD-9-CM    1. Type 2 diabetes with nephropathy (HCC)  E11.21 250.40 HEMOGLOBIN A1C WITH EAG     583.81 MICROALBUMIN, UR, RAND W/ MICROALB/CREAT RATIO      METABOLIC PANEL, COMPREHENSIVE      METABOLIC PANEL, COMPREHENSIVE      MICROALBUMIN, UR, RAND W/ MICROALB/CREAT RATIO      HEMOGLOBIN A1C WITH EAG      2. Hypertension associated with diabetes ([facility]  E11.59 250.80     I15.2 401.9       3. Ingrown toenail of left foot  L60.0 703.0 REFERRAL TO PODIATRY      4. Food insecurity  Z59.41 V60.89 AMB REFERRAL FOR SOCIAL DETERMINANTS OF HEALTH      5. Encounter for screening involving social determinants of health (SDoH)  Z13.9 V82.9 AMB REFERRAL FOR SOCIAL DETERMINANTS OF HEALTH      6. Encounter for immunization  Z23 V03.89 INFLUENZA, FLUARIX, FLULAVAL, FLUZONE (AGE 6 MO+), AFLURIA(AGE 3Y+) IM, PF, 0.5 ML      ADMIN INFLUENZA VIRUS VAC          Stannitra BANDAR Keyshawn Freed is an 31 y.o. female here today for followup of DM and obesity also with ingrown left great toenail. Screening positive for food and financial insecurity and this is limiting her ability to access the kinds of foods she needs to be eating in order to keep DM in good control She is seeing Dr Andra Mclain so working on dietary changes and had first meeting with nutritionist last week.  Encouraged her to advocate for herself- she is truly trying to do everything right for her weight and diabetes but lacks the understanding of nutrition to an extreme degree particularly when it comes to food composition and what foods have \"sugar\" in them. Would benefit greatly from a written out meal plan so hoping she can discuss this with the nutritionist and encouraged to do so. Also screened positive for food and financial insecurity today - gave express permission to be contacted by [facility] ALBERTO to discuss further resources. Will reach out to Dr Eric seaman list of psychologists to assist her with finding someone in network. 1. Type 2 diabetes with nephropathy (HCC)  - METABOLIC PANEL, COMPREHENSIVE  - MICROALBUMIN, UR, RAND W/ MICROALB/CREAT RATIO  - HEMOGLOBIN A1C WITH EAG    2. Hypertension associated with diabetes ([facility] [address]  - continue lisinopril 5mg daily    3. Ingrown toenail of left foot  - REFERRAL TO PODIATRY    4. Food insecurity  - gave information for [facility] in [facility] and referral placed to , Vivek Sands, with patient permission    5. Encounter for screening involving social determinants of health [facility])  - gave information for [facility] in [facility] and referral placed to , Vivek Sands, with patient permission    6. Encounter for immunization  - INFLUENZA, FLUARIX, FLULAVAL, FLUZONE (AGE 6 MO+), AFLURIA(AGE 3Y+) IM, PF, 0.5 ML  - ADMIN INFLUENZA VIRUS VAC    Patient informed to follow up: Follow-up and Dispositions    Return in about 3 months (around 2/3/2023) for diabetes followup . I have discussed the diagnosis with the patient and the intended plan as seen in the above orders. Patient verbalized understanding of the plan and agrees with the plan. The patient has received an after-visit summary and questions were answered concerning future plans. I have discussed medication side effects and warnings with the patient as well. Informed patient to return to the office if new symptoms arise.       Josue Bowles MD  [facility]

## 2022-11-03 NOTE — PROGRESS NOTES
Chief Complaint   Patient presents with    Weight Management     3 mth         1. \"Have you been to the ER, urgent care clinic since your last visit? Hospitalized since your last visit? \" No    2. \"Have you seen or consulted any other health care providers outside of the [address] [place] [address] since your last visit? \" No     3. For patients aged 39-70: Has the patient had a colonoscopy / FIT/ Cologuard? NA - based on age      If the patient is female:    4. For patients aged 41-77: Has the patient had a mammogram within the past 2 years? NA - based on age or sex      11. For patients aged 21-65: Has the patient had a pap smear? Yes - no Care Gap present         3 most recent PHQ Screens 11/3/2022   Little interest or pleasure in doing things Not at all   Feeling down, depressed, irritable, or hopeless Several days   Total Score PHQ 2 1   Trouble falling or staying asleep, or sleeping too much -   Feeling tired or having little energy -   Poor appetite, weight loss, or overeating -   Feeling bad about yourself - or that you are a failure or have let yourself or your family down -   Trouble concentrating on things such as school, work, reading, or watching TV -   Moving or speaking so slowly that other people could have noticed; or the opposite being so fidgety that others notice -   Thoughts of being better off dead, or hurting yourself in some way -   PHQ 9 Score -   How difficult have these problems made it for you to do your work, take care of your home and get along with others -       Health Maintenance Due   Topic Date Due    Hepatitis B Vaccine (1 of 3 - Risk 3-dose series) Never done    DTaP/Tdap/Td series (1 - Tdap) Never done    Eye Exam Retinal or Dilated  06/30/2017    COVID-19 Vaccine (3 - Booster for Kimberly Divine series) 04/23/2021    Foot Exam Q1  10/06/2022    MICROALBUMIN Q1  10/06/2022     Brenda Calvin is a 31 y.o. female  who presents for  FLU immunizations.    she denies any symptoms , reactions or allergies that would exclude them from being immunized today. Risks and adverse reactions were discussed and the VIS was given to them. All questions were addressed. she was observed for 10 min post injection. There were no reactions observed. LOT: [id number]  EXP.  DATE: 6/30/23  NDC: 49118-193-44  Nery Mendosa LPN

## 2022-11-04 ENCOUNTER — HOSPITAL ENCOUNTER (OUTPATIENT)
Dept: MRI IMAGING | Age: 37
Discharge: HOME | End: 2022-11-04
Attending: STUDENT IN AN ORGANIZED HEALTH CARE EDUCATION/TRAINING PROGRAM

## 2022-11-04 LAB
ALBUMIN SERPL-MCNC: 3.6 G/DL (ref 3.5–5)
ALBUMIN/GLOB SERPL: 0.9 {RATIO} (ref 1.1–2.2)
ALP SERPL-CCNC: 114 U/L (ref 45–117)
ALT SERPL-CCNC: 19 U/L (ref 12–78)
ANION GAP SERPL CALC-SCNC: 7 MMOL/L (ref 5–15)
AST SERPL-CCNC: 11 U/L (ref 15–37)
BILIRUB SERPL-MCNC: 0.3 MG/DL (ref 0.2–1)
BUN SERPL-MCNC: 8 MG/DL (ref 6–20)
BUN/CREAT SERPL: 14 (ref 12–20)
CALCIUM SERPL-MCNC: 10 MG/DL (ref 8.5–10.1)
CHLORIDE SERPL-SCNC: 101 MMOL/L (ref 97–108)
CO2 SERPL-SCNC: 30 MMOL/L (ref 21–32)
CREAT SERPL-MCNC: 0.59 MG/DL (ref 0.55–1.02)
CREAT UR-MCNC: 153 MG/DL
EST. AVERAGE GLUCOSE BLD GHB EST-MCNC: 214 MG/DL
GLOBULIN SER CALC-MCNC: 4.2 G/DL (ref 2–4)
GLUCOSE SERPL-MCNC: 173 MG/DL (ref 65–100)
HBA1C MFR BLD: 9.1 % (ref 4–5.6)
MICROALBUMIN UR-MCNC: 2.08 MG/DL
MICROALBUMIN/CREAT UR-RTO: 14 MG/G (ref 0–30)
POTASSIUM SERPL-SCNC: 4 MMOL/L (ref 3.5–5.1)
PROT SERPL-MCNC: 7.8 G/DL (ref 6.4–8.2)
SODIUM SERPL-SCNC: 138 MMOL/L (ref 136–145)

## 2022-11-07 NOTE — PROGRESS NOTES
CMP - improved LFTs, continue to monitor  A1c down from 10.1 --> 9.1 on GLP, c/w current treatment  Microalb neg    MyChart message sent to patient

## 2022-11-28 ENCOUNTER — CLINICAL SUPPORT (OUTPATIENT)
Dept: SURGERY | Age: 37
End: 2022-11-28

## 2022-11-28 DIAGNOSIS — I15.2 HYPERTENSION ASSOCIATED WITH DIABETES (HCC): ICD-10-CM

## 2022-11-28 DIAGNOSIS — E11.59 HYPERTENSION ASSOCIATED WITH DIABETES (HCC): ICD-10-CM

## 2022-11-28 DIAGNOSIS — E66.01 OBESITY, MORBID (HCC): Primary | ICD-10-CM

## 2022-11-28 DIAGNOSIS — E11.65 UNCONTROLLED TYPE 2 DIABETES MELLITUS WITH HYPERGLYCEMIA (HCC): ICD-10-CM

## 2022-11-28 NOTE — PROGRESS NOTES
[facility]  Supervised Weight Loss     Date:   2022    Patient's Name: Colt Krueger  : 1985    Insurance:  +              Session: 2 of  6  Surgery: Gastric Bypass    Surgeon:  Leona Nagel M.D. Height: 62\"  Reported Weight:    337      Lbs. BMI: 61   Pounds Lost since last month: 5#               Pounds Gained since last month: 0    Starting Weight: 342#   Previous Months Weight: 342#  Overall Pounds Lost: 5#  Overall Pounds Gained: 0    Other Pertinent Information: Today's appointment was completed over the phone. Pt recommended to lose 15# while in the program.     Smoking Status:  smokes marijuana (for anxiety)  Alcohol Intake: none    I have reviewed with pt the guidelines of the supervised wt loss program.  Pt understands the expectations of some wt loss during the program and that wt gain could delay the process. I have also explained that appointments need to be consecutive and missing an appointment may result in starting over. Pt has received this information in writing. Changes that patient has made since last month include:  Exercising, portion control. Drinking a protein shake for breakfast.       Eating Habits and Behaviors  A nutrition lesson was presented on label reading with specific guidelines provided for limiting added sugars. This information will help increase healthy food choices, promote weight loss and prevent dumping syndrome after gastric bypass. We also reviewed the general nutrition guidelines for bariatric surgery. Patient's current diet habits include: Pt is eating 3 meals per day. Breakfast is a protein shake ([place] Instant Breakfast). Snack choices include yogurt, popcorn. Packing lunch to take to work (sandwich, chips, fruit, protein bar). Dinner is protein (turkey) and starch, greens.  Pt is eating refined carbohydrate foods (bread, pasta, rice, potatoes) in moderation. Pt is eating sweets/desserts none. Pt is using healthy cooking methods. Pt is eating meals prepared outside of the home once per week. Pt is drinking mostly water. Pt reports no to emotional eating. Physical Activity/Exercise  We talked about the importance of increasing daily physical activity and beginning to develop an exercise regimen/routine. We talked about exercise as being an important part of long term weight loss after surgery. Comments:  During class, I discussed with patient the importance of getting into an exercise routine. Pt is currently exercising for 30-45 minutes, 5 times per week (walking or Fredy) for activity. Pt has been encouraged to maintain and increase as tolerated. Behavior Modification       We talked about how to eat more mindfully. Tips and recommendations for how to make these changes were provided. Pt was encouraged to keep a food journal and record what they were taking in daily. Overall Assessment: Pt demonstrates appropriate lifestyle changes evidenced by reported changes and reported wt loss. Will continue to assess. Patient-Set Goals:   1. Nutrition - maintain current eating habits and food choices  2. Exercise - maintain 150 minutes per week  3.  Behavior - reading food labels for added sugars (10 grams or less per serving); switch protein shake to Cameron Schein" option for lower-sugar    Isela Obrien, RD  11/28/2022

## 2022-11-29 RX ORDER — DULAGLUTIDE 3 MG/.5ML
3 INJECTION, SOLUTION SUBCUTANEOUS
Qty: 4 EACH | Refills: 1 | Status: SHIPPED | OUTPATIENT
Start: 2022-11-29

## 2022-11-29 RX ORDER — LISINOPRIL 5 MG/1
5 TABLET ORAL DAILY
Qty: 90 TABLET | Refills: 1 | Status: SHIPPED | OUTPATIENT
Start: 2022-11-29

## 2022-11-29 NOTE — TELEPHONE ENCOUNTER
PCP: Barron Harvey MD    Last appt: 11/3/2022  Future Appointments   Date Time Provider Gabino Reynolds   12/16/2022  9:30 AM Ted Angel, ARLETHD [facility]   12/19/2022 11:00 AM Enedina Rich RD [facility]   2/3/2023  9:00 AM Barron Harvey MD [facility] AMB       Requested Prescriptions     Pending Prescriptions Disp Refills    lisinopriL (PRINIVIL, ZESTRIL) 5 mg tablet 90 Tablet 1     Sig: Take 1 Tablet by mouth daily. dulaglutide (Trulicity) 3 GM/5.8 mL pnij 4 Each 1     Sig: 3 mg by SubCUTAneous route every seven (7) days.        Prior labs and Blood pressures:  BP Readings from Last 3 Encounters:   11/03/22 138/88   07/01/22 (!) 160/90   06/14/22 (!) 140/91     Lab Results   Component Value Date/Time    Sodium 138 11/03/2022 12:19 PM    Potassium 4.0 11/03/2022 12:19 PM    Chloride 101 11/03/2022 12:19 PM    CO2 30 11/03/2022 12:19 PM    Anion gap 7 11/03/2022 12:19 PM    Glucose 173 (H) 11/03/2022 12:19 PM    BUN 8 11/03/2022 12:19 PM    Creatinine 0.59 11/03/2022 12:19 PM    BUN/Creatinine ratio 14 11/03/2022 12:19 PM    GFR est AA >60 08/23/2022 02:51 PM    GFR est non-AA >60 08/23/2022 02:51 PM    Calcium 10.0 11/03/2022 12:19 PM     Lab Results   Component Value Date/Time    Hemoglobin A1c 9.1 (H) 11/03/2022 12:19 PM    Hemoglobin A1c (POC) 9.0 04/12/2022 11:26 AM     Lab Results   Component Value Date/Time    Cholesterol, total 136 01/14/2022 10:08 AM    HDL Cholesterol 39 01/14/2022 10:08 AM    LDL, calculated 52.2 01/14/2022 10:08 AM    VLDL, calculated 44.8 01/14/2022 10:08 AM    Triglyceride 224 (H) 01/14/2022 10:08 AM    CHOL/HDL Ratio 3.5 01/14/2022 10:08 AM     Lab Results   Component Value Date/Time    Vitamin D 25-Hydroxy <9.0 (L) 10/06/2021 10:46 AM       Lab Results   Component Value Date/Time    TSH 1.750 09/16/2020 12:09 PM    TSH 1.470 01/09/2019 06:10 PM

## 2022-12-16 ENCOUNTER — OFFICE VISIT (OUTPATIENT)
Dept: FAMILY MEDICINE CLINIC | Age: 37
End: 2022-12-16

## 2022-12-16 VITALS — BODY MASS INDEX: 62.55 KG/M2 | WEIGHT: 293 LBS

## 2022-12-16 DIAGNOSIS — E11.65 UNCONTROLLED TYPE 2 DIABETES MELLITUS WITH HYPERGLYCEMIA (HCC): Primary | ICD-10-CM

## 2022-12-16 RX ORDER — DULAGLUTIDE 4.5 MG/.5ML
4.5 INJECTION, SOLUTION SUBCUTANEOUS
Qty: 4 EACH | Refills: 2 | Status: SHIPPED | OUTPATIENT
Start: 2022-12-16 | End: 2022-12-16

## 2022-12-16 RX ORDER — DULAGLUTIDE 4.5 MG/.5ML
4.5 INJECTION, SOLUTION SUBCUTANEOUS
Qty: 4 EACH | Refills: 2 | Status: SHIPPED | OUTPATIENT
Start: 2022-12-16

## 2022-12-16 RX ORDER — METFORMIN HYDROCHLORIDE 500 MG/1
1000 TABLET ORAL 2 TIMES DAILY WITH MEALS
Qty: 360 TABLET | Refills: 0 | Status: SHIPPED | OUTPATIENT
Start: 2022-12-16

## 2022-12-16 NOTE — PROGRESS NOTES
Pharmacy Progress Note - Diabetes Management    S/O: Ms. Ford Berry is a 31 y.o. female, referred by Dr. Jeannette Corley MD, with a PMH of HTN, hx of gallstones, CH, T2DM, OA, vit D deficiency, HLD, obesity, was seen virtually today for diabetes management. Patient's last A1c was 9.1% (Nov 2022) which is decreased from 10.1% (Aug 2022). Interim update: Pt arrives to clinic today and states she has tolerated increased dose in Trulicity. She has started her second box. Pt has met with nutritionist.  She has learned about the plate method and how to balanced incorporate snacks into her food choices. Pt requests refill of Metformin. Current anti-hyperglycemic regimen includes:    - Trulicity 3 mg weekly  - Glipizide 10 mg 1/2 tab BID  - Metformin 500 mg 2 tabs BID    ROS:  Today, Pt endorses:  - Symptoms of Hyperglycemia: none  - Symptoms of Hypoglycemia: none    Self Monitoring Blood Glucose (SMBG) or CGM:  - Brought in home glucometer/blood glucose log/CGM reader today:  yes  AM numbers are high  This AM - 140s  Yesterday AM - 160s, checked again and it was 153 (10AM), after lunch it was 165, before dinner 134    Nutrition:  Breakfast - breakfast shake - carnation instant breakfast  Snack - apple, peanutbutter  Lunch - a sandwich on honey wheat bread, veggie chips, fruit  Snack - yogurt or jello  Dinner - salad with veg and chicken wings  Drinks - water    Physical Activity:   yes  - Consists of cris      Vitals:   Wt Readings from Last 3 Encounters:   11/03/22 337 lb (152.9 kg)   09/27/22 342 lb (155.1 kg)   08/23/22 344 lb (156 kg)     BP Readings from Last 3 Encounters:   11/03/22 138/88   07/01/22 (!) 160/90   06/14/22 (!) 140/91     Pulse Readings from Last 3 Encounters:   11/03/22 96   07/01/22 80   06/14/22 89       Past Medical History:   Diagnosis Date    Diabetes ([facility] [address]     Gallstones     History of chickenpox 06/11/2010    Hyperlipidemia 06/24/2014    Hypertension     Not on any meds    Major depressive disorder in full remission ([facility] [address] 07/14/2022    CH (nonalcoholic steatohepatitis) 01/13/2016     Allergies   Allergen Reactions    Pcn [Penicillins] Unable to Obtain     \"Childhood allergy\"  Ancef 2g given pre-op with no reaction noted to test dose. Current Outpatient Medications   Medication Sig    lisinopriL (PRINIVIL, ZESTRIL) 5 mg tablet Take 1 Tablet by mouth daily. dulaglutide (Trulicity) 3 LN/5.3 mL pnij 3 mg by SubCUTAneous route every seven (7) days. diazePAM (VALIUM) 2 mg tablet PLEASE SEE ATTACHED FOR DETAILED DIRECTIONS (Patient not taking: Reported on 11/3/2022)    metFORMIN (GLUCOPHAGE) 500 mg tablet Take 2 Tablets by mouth two (2) times daily (with meals). glipiZIDE (GLUCOTROL) 10 mg tablet Take 0.5 Tablets by mouth two (2) times a day. TAKE 1 TABLET BY MOUTH TWICE A DAY    OneTouch Ultra2 Meter misc TEST BLOOD SUGAR DAILY    ergocalciferol (ERGOCALCIFEROL) 1,250 mcg (50,000 unit) capsule TAKE 1 CAPSULE BY MOUTH EVERY SEVEN (7) DAYS. glucose blood VI test strips (ASCENSIA AUTODISC VI, ONE TOUCH ULTRA TEST VI) strip Check blood sugar daily    lancets misc Use with one touch test daily    atorvastatin (LIPITOR) 40 mg tablet TAKE 1 TABLET BY MOUTH EVERY DAY    sertraline (ZOLOFT) 50 mg tablet Take 1 Tablet by mouth daily. glucose blood VI test strips (blood glucose test) strip Any brand, for uncontrolled DM E11.21 check up to 3x daily    alcohol swabs (Alcohol Pads) padm Use to clear skin before SC injections    Blood-Glucose Meter monitoring kit Fill with strips and lancets for bid testing. Uncontrolled type 2 DM E11.21     No current facility-administered medications for this visit.      Lab Results   Component Value Date/Time    Sodium 138 11/03/2022 12:19 PM    Potassium 4.0 11/03/2022 12:19 PM    Chloride 101 11/03/2022 12:19 PM    CO2 30 11/03/2022 12:19 PM    Anion gap 7 11/03/2022 12:19 PM    Glucose 173 (H) 11/03/2022 12:19 PM    BUN 8 11/03/2022 12:19 PM    Creatinine 0.59 11/03/2022 12:19 PM    BUN/Creatinine ratio 14 11/03/2022 12:19 PM    GFR est AA >60 08/23/2022 02:51 PM    GFR est non-AA >60 08/23/2022 02:51 PM    Calcium 10.0 11/03/2022 12:19 PM    Bilirubin, total 0.3 11/03/2022 12:19 PM    Alk. phosphatase 114 11/03/2022 12:19 PM    Protein, total 7.8 11/03/2022 12:19 PM    Albumin 3.6 11/03/2022 12:19 PM    Globulin 4.2 (H) 11/03/2022 12:19 PM    A-G Ratio 0.9 (L) 11/03/2022 12:19 PM    ALT (SGPT) 19 11/03/2022 12:19 PM     Lab Results   Component Value Date/Time    Cholesterol, total 136 01/14/2022 10:08 AM    HDL Cholesterol 39 01/14/2022 10:08 AM    LDL, calculated 52.2 01/14/2022 10:08 AM    VLDL, calculated 44.8 01/14/2022 10:08 AM    Triglyceride 224 (H) 01/14/2022 10:08 AM    CHOL/HDL Ratio 3.5 01/14/2022 10:08 AM     Lab Results   Component Value Date/Time    WBC 8.6 10/06/2021 10:46 AM    HGB 11.4 (L) 10/06/2021 10:46 AM    HCT 36.3 10/06/2021 10:46 AM    PLATELET 969 38/44/4671 10:46 AM    MCV 85.8 10/06/2021 10:46 AM       Lab Results   Component Value Date/Time    Microalbumin/Creat ratio (mg/g creat) 14 11/03/2022 12:19 PM    Microalbumin,urine random 2.08 11/03/2022 12:19 PM       Lab Results   Component Value Date/Time    Hemoglobin A1c 9.1 (H) 11/03/2022 12:19 PM    Hemoglobin A1c 10.1 (H) 08/23/2022 02:51 PM    Hemoglobin A1c 10.3 (H) 01/14/2022 10:08 AM     Hemoglobin A1c (POC)   Date Value Ref Range Status   04/12/2022 9.0 % Final        CrCl cannot be calculated (Unknown ideal weight. ). A/P:    1) T2DM: Per ADA guidelines, Pt's A1c is not at goal of < 7%. Current SMBG(s)/CGM trend indicates better controlled rdgs; however, they are not in line with A1c. Pt does not bring in meter and rdgs cannot be confirmed. Will increase Trulicity in order to maximize med. Provided refill of Metformin. Discussed possibility of additional therapy - SGLT2 - needed.   - INCREASE Trulicity to 4.5 mg weekly  - Continue Metformin 500 mg 2 tabs BID  - Continue Glipizide 10 mg 1/2 tab BID      Medication reconciliation was completed during the visit. Medications Discontinued During This Encounter   Medication Reason    dulaglutide (Trulicity) 3 IF/7.2 mL pnij DOSE ADJUSTMENT    metFORMIN (GLUCOPHAGE) 500 mg tablet REORDER    dulaglutide (Trulicity) 4.5 RJ/4.2 mL pnij      Orders Placed This Encounter    DISCONTD: dulaglutide (Trulicity) 4.5 DA/0.6 mL pnij     Si.5 mg by SubCUTAneous route every seven (7) days. Dispense:  4 Each     Refill:  2    metFORMIN (GLUCOPHAGE) 500 mg tablet     Sig: Take 2 Tablets by mouth two (2) times daily (with meals). Dispense:  360 Tablet     Refill:  0    dulaglutide (Trulicity) 4.5 WW/1.9 mL pnij     Si.5 mg by SubCUTAneous route every seven (7) days. Dispense:  4 Each     Refill:  2       Patient verbalized understanding of the information presented and all of the patients questions were answered. AVS was handed to the patient. Patient advised to call the office with any additional questions or concerns. Notifications of recommendations will be sent to Dr. Elsie Edge MD for review. Patient will return to clinic in 8 week(s) for follow up.      Capri Martin, PharmD, BCGP, BCACP  Clinical Pharmacist Specialist          For Pharmacy Admin Tracking Only    Program: Medical Group  CPA in place: Yes  Recommendation Provided To: Patient/Caregiver: 5 via In person  Intervention Detail: Discontinued Rx: 1, reason: Therapy Complete, Dose Adjustment: 1, reason: Therapy Optimization, New Rx: 1, reason: Needs Additional Therapy, Refill(s) Provided, and Scheduled Appointment  Intervention Accepted By: Patient/Caregiver: 5  Time Spent (min): 60

## 2022-12-19 ENCOUNTER — CLINICAL SUPPORT (OUTPATIENT)
Dept: SURGERY | Age: 37
End: 2022-12-19

## 2022-12-19 DIAGNOSIS — E66.01 OBESITY, MORBID (HCC): Primary | ICD-10-CM

## 2022-12-19 NOTE — PROGRESS NOTES
[facility] at [facility]  Supervised Weight Loss     Date:   2022    Patient's Name: Cherry Balderas  : 1985    Insurance:  +                                  Session: 3 of  6  Surgery: Gastric Bypass                    Surgeon:  Erika Lomeli M.D. Height: 62\"                 Reported Weight:    334      Lbs. BMI: 61             Pounds Lost since last month: 4#               Pounds Gained since last month: 0     Starting Weight: 342#                       Previous Months Weight: 337#  Overall Pounds Lost: 8#                   Overall Pounds Gained: 0     Other Pertinent Information: Today's appointment was completed over the phone. Pt recommended to lose 15# while in the program.    Smoking Status:  smokes marijuana and reports she has reduced frequency  Alcohol Intake: 2 drinks on special occasions     I have reviewed with pt the guidelines of the supervised wt loss program.  Pt understands the expectations of some wt loss during the program and that wt gain could delay the process. I have also explained that classes need to be consecutive. Missing a class may result in starting over. Pt has received this information in writing. Changes that patient has made since last month include:  Still using protein shakes. Reading food labels for calories, carbs and added sugars. Eating Habits and Behaviors  General healthy eating guidelines were discussed. A nutrition lesson specific to the importance of protein intake after surgery was provided. We discussed food sources of protein, protein supplements and multiple reasons as to why protein is important after bariatric surgery. Pts were instructed to focus on including protein at every meal and practice eating protein first at the meal. Pts were encouraged to sample a protein shake for tolerance.  Patients were also instructed to use the balanced plate method for help with portion control and general healthy eating prior to surgery. We discussed measuring meals to 1/2 cup total per meal after surgery. Drinking only calorie-free, sugar-free and non-carbonated beverages. We discussed the importance of drinking 64 ounces of fluid per day to prevent dehydration post-operatively. Patient's current diet habits include: Pt is eating 3 meals per day. Drinking a protein shake for breakfast ([employer] Instant Breakfast - regular). Plans to switch Light Start or another brand next time she shops. Snack choices include apple with peanut butter. Lunch is slice of cheese pizza with a side salad and fruit cup. Dinner was turkey burger and sweet potato fries and broccoli. Pt is eating refined carbohydrate foods (bread, pasta, rice, potatoes) in moderation. Pt is eating sweets/desserts none reported. Pt is using healthy cooking methods. Pt is drinking mostly water. Denies any other fluid choices. Pt reports no to emotional eating. Physical Activity/Exercise  We talked about the importance of increasing daily physical activity and beginning to develop an exercise regimen/routine. We talked about exercise as being an important part of long term weight loss after surgery. Comments:  During class, I discussed with patient the importance of getting into an exercise routine. Pt is currently doing Fredy video at home for 45 minutes, 3-4 times per week. Pt has been encouraged to maintain and increase as tolerated. Behavior Modification       We talked about how to eat more mindfully. Tips and recommendations for how to make these changes were provided. Pt was encouraged to keep a food journal and record what they were taking in daily. Overall Assessment: Pt demonstrates small lifestyle changes evidenced by reported maintenance of previously made changes and reported wt loss. Will continue to assess. Patient-Set Goals:   1.  Nutrition - switch protein shake to higher protein/lower sugar options (specifically examples provided)   2. Exercise - maintain Fredy and increase as tolerated   3.  Behavior -protein first at the meal and snack     Becca Lucio RD  12/19/2022

## 2023-01-16 ENCOUNTER — CLINICAL SUPPORT (OUTPATIENT)
Dept: SURGERY | Age: 38
End: 2023-01-16

## 2023-01-16 DIAGNOSIS — E66.01 OBESITY, MORBID (HCC): Primary | ICD-10-CM

## 2023-01-16 NOTE — PROGRESS NOTES
[facility] at [facility]  Supervised Weight Loss     Date:   2023    Patient's Name: Neelam De La Paz  : 1985    Insurance:  +                                  Session: 4 of  6  Surgery: Gastric Bypass                    Surgeon:  Betsy Rich M.D. Height: 62\"                 Reported Weight:    334      Lbs. BMI: 61             Pounds Lost since last month: 0#               Pounds Gained since last month: 0     Starting Weight: 342#                       Previous Months Weight: 334#  Overall Pounds Lost: 8#                   Overall Pounds Gained: 0     Other Pertinent Information: Today's appointment was completed over the phone. Pt recommended to lose 15# while in the program.     Smoking Status:  smokes marijuana and reports she has reduced frequency  Alcohol Intake: 2 drinks on special occasions      I have reviewed with pt the guidelines of the supervised wt loss program.  Pt understands the expectations of some wt loss during the program and that wt gain could delay the process. I have also explained that appointments need to be consecutive and missing an appointment may result in starting over. Pt has received this information in writing. Changes that patient has made since last month include:  Tried a new protein shake (Slim Fast). Eating Habits and Behaviors  A nutrition lesson specific to vitamins was provided. We discussed the various reasons for needing vitamins and different types and doses. General healthy eating guidelines were also discussed. Pts were instructed that their plate should be made up 1/2 plate coming from non-starchy vegetables, 1/4 coming from lean meat, and 1/4 of their plate coming from carbohydrates, including fruits, starches, or milk. We discussed measuring meals to 1/2 cup total per meal after surgery. Drinking only calorie-free, sugar-free and non-carbonated beverages.  We discussed the importance of drinking 64 ounces of fluid per day to prevent dehydration post-operatively. Patient's current diet habits include: Pt is eating 3 meals per day. Drinking a shake for breakfast. Lunch is packed from home and sandwich, chips, fruit and water. Dinner was skipped last night. Normally eats protein (tuna or turkey burger). Pt is eating refined carbohydrate foods (bread, pasta, rice, potatoes) in moderation. Pt is eating sweets/desserts none. Pt is using healthy cooking methods. Pt is eating meals prepared outside of the home 1-3 times per week. Pt is drinking water and hot tea and sugar free apple cider. Pt reports no to emotional eating. Physical Activity/Exercise  We talked about the importance of increasing daily physical activity and beginning to develop an exercise regimen/routine. We talked about exercise as being an important part of long term weight loss after surgery. Comments:  During class, I discussed with patient the importance of getting into an exercise routine. Pt is currently doing Fredy video and walking for 45 minutes. Reports exercising daily. Pt has been encouraged to maintain and increase as tolerated. Behavior Modification       We talked about how to eat more mindfully and identify emotional eating triggers. Tips and recommendations for how to make these changes were provided. Pt was encouraged to keep a food journal and record what they were taking in daily. Overall Assessment: Pt demonstrates small appropriate lifestyle changes evidenced by reported changes. Pt unsure of current wt. Will continue to assess. Patient-Set Goals:   1. Nutrition - maintain current eating habits as described, continue to try different protein shakes from list  2. Exercise - maintain exercise   3.  Behavior -print and review vitamin recommendations     Janny Carrasco, RD  1/16/2023

## 2023-01-25 ENCOUNTER — PATIENT MESSAGE (OUTPATIENT)
Dept: FAMILY MEDICINE CLINIC | Age: 38
End: 2023-01-25

## 2023-01-25 DIAGNOSIS — E11.65 UNCONTROLLED TYPE 2 DIABETES MELLITUS WITH HYPERGLYCEMIA (HCC): Primary | ICD-10-CM

## 2023-02-01 RX ORDER — DULAGLUTIDE 1.5 MG/.5ML
4.5 INJECTION, SOLUTION SUBCUTANEOUS
Qty: 6 ML | Refills: 2 | Status: SHIPPED | OUTPATIENT
Start: 2023-02-01

## 2023-02-01 NOTE — TELEPHONE ENCOUNTER
Pharmacy Progress Note     National drug shortage for Trulicity 4,5 mg has caused this writer to send in for Trulicity 1.5 mg and instructed pt to take 3 injections per week. Medications Discontinued During This Encounter   Medication Reason    dulaglutide (Trulicity) 4.5 OH/5.6 mL pnij Therapy Completed     Orders Placed This Encounter    dulaglutide (Trulicity) 1.5 GW/7.6 mL sub-q pen     Si.5 mL by SubCUTAneous route every seven (7) days.      Dispense:  6 mL     Refill:  2         Judy Aguirre, PharmD, BCGP, BCACP  Clinical Pharmacist Specialist      For Pharmacy Admin Tracking Only    Program: Medical Group  CPA in place: Yes  Recommendation Provided To: Patient/Caregiver: 2 via Telephone and [employer] Message and Pharmacy: 2  Intervention Detail: Discontinued Rx: 1, reason: Therapy Complete and New Rx: 1, reason: Needs Additional Therapy  Intervention Accepted By: Patient/Caregiver: 2 and Pharmacy: 2  Time Spent (min): 15

## 2023-02-03 ENCOUNTER — OFFICE VISIT (OUTPATIENT)
Dept: FAMILY MEDICINE CLINIC | Age: 38
End: 2023-02-03
Payer: MEDICAID

## 2023-02-03 VITALS
DIASTOLIC BLOOD PRESSURE: 83 MMHG | TEMPERATURE: 97.7 F | BODY MASS INDEX: 53.92 KG/M2 | WEIGHT: 293 LBS | HEIGHT: 62 IN | HEART RATE: 94 BPM | RESPIRATION RATE: 16 BRPM | SYSTOLIC BLOOD PRESSURE: 129 MMHG | OXYGEN SATURATION: 97 %

## 2023-02-03 DIAGNOSIS — E78.5 HYPERLIPIDEMIA, UNSPECIFIED HYPERLIPIDEMIA TYPE: ICD-10-CM

## 2023-02-03 DIAGNOSIS — E66.01 OBESITY, MORBID (HCC): ICD-10-CM

## 2023-02-03 DIAGNOSIS — F32.5 MAJOR DEPRESSIVE DISORDER IN FULL REMISSION, UNSPECIFIED WHETHER RECURRENT (HCC): ICD-10-CM

## 2023-02-03 DIAGNOSIS — E11.59 HYPERTENSION ASSOCIATED WITH DIABETES (HCC): ICD-10-CM

## 2023-02-03 DIAGNOSIS — I15.2 HYPERTENSION ASSOCIATED WITH DIABETES (HCC): ICD-10-CM

## 2023-02-03 DIAGNOSIS — G51.0 FACIAL PARALYSIS/BELLS PALSY: ICD-10-CM

## 2023-02-03 DIAGNOSIS — E11.65 TYPE 2 DIABETES MELLITUS WITH HYPERGLYCEMIA, WITHOUT LONG-TERM CURRENT USE OF INSULIN (HCC): Primary | ICD-10-CM

## 2023-02-03 PROCEDURE — 99215 OFFICE O/P EST HI 40 MIN: CPT | Performed by: STUDENT IN AN ORGANIZED HEALTH CARE EDUCATION/TRAINING PROGRAM

## 2023-02-03 PROCEDURE — 3046F HEMOGLOBIN A1C LEVEL >9.0%: CPT | Performed by: STUDENT IN AN ORGANIZED HEALTH CARE EDUCATION/TRAINING PROGRAM

## 2023-02-03 RX ORDER — OMEPRAZOLE 20 MG/1
CAPSULE, DELAYED RELEASE ORAL
COMMUNITY

## 2023-02-03 RX ORDER — ATORVASTATIN CALCIUM 40 MG/1
TABLET, FILM COATED ORAL
Qty: 90 TABLET | Refills: 3 | Status: SHIPPED | OUTPATIENT
Start: 2023-02-03

## 2023-02-03 RX ORDER — LISINOPRIL 20 MG/1
20 TABLET ORAL DAILY
COMMUNITY
End: 2023-02-03 | Stop reason: SDUPTHER

## 2023-02-03 RX ORDER — KETOCONAZOLE 20 MG/G
CREAM TOPICAL
COMMUNITY
Start: 2022-12-21

## 2023-02-03 RX ORDER — LISINOPRIL 20 MG/1
20 TABLET ORAL DAILY
Qty: 90 TABLET | Refills: 0 | Status: SHIPPED | OUTPATIENT
Start: 2023-02-03

## 2023-02-03 NOTE — PROGRESS NOTES
Ezequiel Pacheco  30 y.o. female  1985  [facility] [id number]  [id number]     [facility]       Chief Complaint: bells palsy, DM  Source: self     Subjective  Ezequiel Pacheco is an 30 y.o. female who presents for DM followup. She meets regularly with Erasmo Brandon pharmD for medication management and discussion of dietary adjustments. Overtime has had improvements in A1c. DIABETIC CARE CHECKLIST: checking BG in the morning before eating - running 140-170s  1. Patient on ASA - no  2. Patient on Statin- yes lipitor 40mg   3. Patient on ACE- lisinopril 20mg   4. Diet- breakfast protein shake, snack apples or banana and PB, sandwich with baked chips, fruit snack, more salads, dinner is grilled chicken sometimes fish, boiled red potatoes x3 - she is working with RD through bariatric surgery office on diet  5. Exercise - doing some physical activity   6. Blood pressure today - 129/83  7. Last eye check - saw Dr Donna Khalil - requested records  8. Last cholesterol check - overdue    9 . Last HbA1c -   Lab Results   Component Value Date/Time    Hemoglobin A1c 9.1 (H) 11/03/2022 12:19 PM    Hemoglobin A1c (POC) 9.0 04/12/2022 11:26 AM       10. Last urine microalbulmin-   Lab Results   Component Value Date/Time    Microalbumin/Creat ratio (mg/g creat) 14 11/03/2022 12:19 PM    Microalbumin,urine random 2.08 11/03/2022 12:19 PM     11. Last comprehensive foot exam -  11/3/22  12. Did patient receive pneumococcal vaccine - yes  13. Does the patient have a nephrologist- no, no need  14. Does the patient have a cardiologist- no, no need  15. Seasonal influenza vaccine - yes      Complains Sweating and hot and cold overnight intermittently. No cough. Does work in a group home. Unsure if related to BGs    Dr Aguilar Bodily - weigh loss doing RD sessions, need [facility] for eval, having difficulty finding someone in network.  No significant change in weight despite initiation of GLP    Recently in ED for [place] Palsy which has affected the left side of her face. Its improving, eyes staying moisturized and can open and close eyes better than before      Allergies - reviewed: Allergies   Allergen Reactions    Pcn [Penicillins] Unable to Obtain     \"Childhood allergy\"  Ancef 2g given pre-op with no reaction noted to test dose. Medications - reviewed:   Current Outpatient Medications   Medication Sig    ketoconazole (NIZORAL) 2 % topical cream APPLY MEDICATION TO AFFECTED SITES DAILY    omeprazole (PRILOSEC) 20 mg capsule TAKE 1 CAPSULE BY MOUTH EVERY DAY Oral for 10    lisinopriL (PRINIVIL, ZESTRIL) 20 mg tablet Take 1 Tablet by mouth daily. atorvastatin (LIPITOR) 40 mg tablet TAKE 1 TABLET BY MOUTH EVERY DAY    dulaglutide (Trulicity) 1.5 WD/0.8 mL sub-q pen 1.5 mL by SubCUTAneous route every seven (7) days. metFORMIN (GLUCOPHAGE) 500 mg tablet Take 2 Tablets by mouth two (2) times daily (with meals). glipiZIDE (GLUCOTROL) 10 mg tablet Take 0.5 Tablets by mouth two (2) times a day. TAKE 1 TABLET BY MOUTH TWICE A DAY    OneTouch Ultra2 Meter misc TEST BLOOD SUGAR DAILY    glucose blood VI test strips (ASCENSIA AUTODISC VI, ONE TOUCH ULTRA TEST VI) strip Check blood sugar daily    lancets misc Use with one touch test daily    sertraline (ZOLOFT) 50 mg tablet Take 1 Tablet by mouth daily. glucose blood VI test strips (blood glucose test) strip Any brand, for uncontrolled DM E11.21 check up to 3x daily    alcohol swabs (Alcohol Pads) padm Use to clear skin before SC injections    Blood-Glucose Meter monitoring kit Fill with strips and lancets for bid testing. Uncontrolled type 2 DM E11.21     No current facility-administered medications for this visit.          Past Medical History - reviewed:  Past Medical History:   Diagnosis Date    Diabetes ([facility])     Gallstones     History of chickenpox 06/11/2010    Hyperlipidemia 06/24/2014    Hypertension     Not on any meds    Major depressive disorder in full remission ([facility] 07/14/2022    CH (nonalcoholic steatohepatitis) 01/13/2016         Past Surgical History - reviewed:   Past Surgical History:   Procedure Laterality Date    HX CHOLECYSTECTOMY  7/2011         Social History - reviewed:  Social History     Socioeconomic History    Marital status: SINGLE     Spouse name: Not on file    Number of children: Not on file    Years of education: Not on file    Highest education level: Not on file   Occupational History    Not on file   Tobacco Use    Smoking status: Never     Passive exposure: Past    Smokeless tobacco: Never   Vaping Use    Vaping Use: Never used   Substance and Sexual Activity    Alcohol use:  Yes     Alcohol/week: 0.0 standard drinks     Comment: occasional etoh    Drug use: Yes     Types: Prescription, Marijuana     Comment: Lghlty smoke s marijuana    Sexual activity: Yes     Partners: Male   Other Topics Concern    Not on file   Social History Narrative    Not on file     Social Determinants of Health     Financial Resource Strain: High Risk    Difficulty of Paying Living Expenses: Hard   Food Insecurity: Food Insecurity Present    Worried About Running Out of Food in the Last Year: Often true    Ran Out of Food in the Last Year: Often true   Transportation Needs: Not on file   Physical Activity: Not on file   Stress: Not on file   Social Connections: Not on file   Intimate Partner Violence: Not on file   Housing Stability: Not on file         Family History - reviewed:  Family History   Problem Relation Age of Onset    High Cholesterol Mother     Diabetes Father     Heart Failure Father     Hypertension Father     Hypertension Maternal Grandmother     Emphysema Maternal Grandfather     Diabetes Maternal Grandfather     Hypertension Maternal Grandfather     Cancer Maternal Grandfather         prostate cancer    Heart Disease Maternal Aunt          Immunizations - reviewed:   Immunization History   Administered Date(s) Administered    COVID-19, MODERNA BLUE border, Primary or Immunocompromised, (age 18y+), IM, 100 mcg/0.5mL 01/28/2021, 02/26/2021    Influenza Vaccine 10/25/2016    Influenza, FLUARIX, FLULAVAL, FLUZONE (age 10 mo+) AND AFLURIA, (age 1 y+), PF, 0.5mL 11/03/2022    Pneumococcal Polysaccharide (PPSV-23) 09/16/2020       Review of Systems   Constitutional:  Negative for chills, fever, malaise/fatigue and weight loss. Cardiovascular:  Negative for chest pain and palpitations. Genitourinary:  Negative for dysuria, frequency and urgency. Musculoskeletal:  Negative for myalgias. Neurological:  Negative for dizziness and headaches. Endo/Heme/Allergies:  Negative for polydipsia. Psychiatric/Behavioral:  Negative for depression. The patient is not nervous/anxious and does not have insomnia. Physical Exam  Visit Vitals  /83 (BP 1 Location: Left arm, BP Patient Position: Sitting, BP Cuff Size: Large adult)   Pulse 94   Temp 97.7 °F (36.5 °C) (Temporal)   Resp 16   Ht 5' 2\" (1.575 m)   Wt 340 lb (154.2 kg)   LMP 01/27/2023   SpO2 97%   BMI 62.19 kg/m²       Physical Exam  Vitals and nursing note reviewed. Constitutional:       General: She is not in acute distress. Appearance: Normal appearance. She is obese. She is not ill-appearing, toxic-appearing or diaphoretic. HENT:      Head: Normocephalic and atraumatic. Neck:      Thyroid: No thyroid mass, thyromegaly or thyroid tenderness. Cardiovascular:      Rate and Rhythm: Normal rate and regular rhythm. Pulses: Normal pulses. Heart sounds: Normal heart sounds. No murmur heard. No friction rub. No gallop. Pulmonary:      Effort: Pulmonary effort is normal. No respiratory distress. Breath sounds: Normal breath sounds. No stridor. No wheezing, rhonchi or rales. Chest:      Chest wall: No tenderness. Abdominal:      Palpations: Abdomen is soft. Musculoskeletal:      Cervical back: Normal range of motion.    Neurological: Mental Status: She is alert. Comments: Left sided facial droop. Able to open and close eyes bilaterally   Psychiatric:         Attention and Perception: Attention and perception normal.         Mood and Affect: Mood normal. Affect is flat. Speech: Speech normal.         Behavior: Behavior normal. Behavior is cooperative. Assessment/Plan    ICD-10-CM ICD-9-CM    1. Type 2 diabetes mellitus with hyperglycemia, without long-term current use of insulin ([facility])  E11.65 250.00 HEMOGLOBIN A1C WITH EAG     790.29 LIPID PANEL      TSH 3RD GENERATION      TSH 3RD GENERATION      HEMOGLOBIN A1C WITH EAG      LIPID PANEL      2. Hypertension associated with diabetes ([facility]  E11.59 250.80 lisinopriL (PRINIVIL, ZESTRIL) 20 mg tablet    I15.2 401.9 atorvastatin (LIPITOR) 40 mg tablet      3. Hyperlipidemia, unspecified hyperlipidemia type  E78.5 272.4 LIPID PANEL      atorvastatin (LIPITOR) 40 mg tablet      4. Obesity, morbid ([facility]  E66.01 278.01       5. Major depressive disorder in full remission, unspecified whether recurrent ([facility]  F32.5 296.26       6. Facial paralysis/[place] palsy  G51.0 351.0           Mynor Dixon is an 30 y.o. female here for followup of DM and following ED visit where she was diagnosed for bells Palsy. She has morbid obesity and is in the process of completing process to have gastric surgery however unable to find psychiatrist to eval. I do have concerns about whether she has the mental capacity and motivation to appropriately change her diet and to abide by the necessary recommendations after the surgery as she Is difficult to get clear history out of. It is surprising that she has not had any weight loss on GLP-1 at the dose it is at over time which may be due to poor diet despite dietary counseling. We discussed her mental health today and I encouraged increase in zoloft as she always seems down and sad but she declined. Total Time for Encounter 43minutes    1.  Type 2 diabetes mellitus with hyperglycemia, without long-term current use of insulin ([facility]): poor control, concern for ongoing poor diet despite working with parmD and RD for weight loss  - HEMOGLOBIN A1C WITH EAG; Future  - TSH 3RD GENERATION; Future  - TSH 3RD GENERATION  - HEMOGLOBIN A1C WITH EAG  - LIPID PANEL    2. Hypertension associated with diabetes ([facility]): stable and  well controlled   - lisinopriL (PRINIVIL, ZESTRIL) 20 mg tablet; Take 1 Tablet by mouth daily. Dispense: 90 Tablet; Refill: 0  - atorvastatin (LIPITOR) 40 mg tablet; TAKE 1 TABLET BY MOUTH EVERY DAY  Dispense: 90 Tablet; Refill: 3    3. Hyperlipidemia, unspecified hyperlipidemia type: due for lipid panel  - atorvastatin (LIPITOR) 40 mg tablet; TAKE 1 TABLET BY MOUTH EVERY DAY  Dispense: 90 Tablet; Refill: 3    4. Obesity, morbid ([facility]  - LIPID PANEL; Future    5. Major depressive disorder in full remission, unspecified whether recurrent ([facility]): not well controlled based on affect and life choices however patient refused modifications to regimen. Attributes poor mood to bells palsy and being frustrated over that  - continue zoloft    6. Facial paralysis/[place] palsy: improving  - continue to monitor    Patient informed to follow up: Sallyanne Chain Follow-up and Dispositions    Return in about 3 months (around 5/3/2023) for CPE with labs . I have discussed the diagnosis with the patient and the intended plan as seen in the above orders. Patient verbalized understanding of the plan and agrees with the plan. The patient has received an after-visit summary and questions were answered concerning future plans. I have discussed medication side effects and warnings with the patient as well. Informed patient to return to the office if new symptoms arise.       Javier Roa MD  [facility]

## 2023-02-03 NOTE — PROGRESS NOTES
Chief Complaint   Patient presents with    Diabetes     Follow up          1. \"Have you been to the ER, urgent care clinic since your last visit? Hospitalized since your last visit? \" Yes [facility]  1/18/23 Bell Palsy     2. \"Have you seen or consulted any other health care providers outside of the [address] [place] [address] since your last visit? \" [facility] 1/18/23 Bell Palsy      3. For patients aged 39-70: Has the patient had a colonoscopy / FIT/ Cologuard? NA - based on age      If the patient is female:    4. For patients aged 41-77: Has the patient had a mammogram within the past 2 years? NA - based on age or sex      11. For patients aged 21-65: Has the patient had a pap smear?  Yes - no Care Gap present         3 most recent PHQ Screens 2/3/2023   Little interest or pleasure in doing things Not at all   Feeling down, depressed, irritable, or hopeless Not at all   Total Score PHQ 2 0   Trouble falling or staying asleep, or sleeping too much -   Feeling tired or having little energy -   Poor appetite, weight loss, or overeating -   Feeling bad about yourself - or that you are a failure or have let yourself or your family down -   Trouble concentrating on things such as school, work, reading, or watching TV -   Moving or speaking so slowly that other people could have noticed; or the opposite being so fidgety that others notice -   Thoughts of being better off dead, or hurting yourself in some way -   PHQ 9 Score -   How difficult have these problems made it for you to do your work, take care of your home and get along with others -       Health Maintenance Due   Topic Date Due    Hepatitis B Vaccine (1 of 3 - Risk 3-dose series) Never done    DTaP/Tdap/Td series (1 - Tdap) Never done    Eye Exam Retinal or Dilated  06/30/2017    COVID-19 Vaccine (3 - Booster for Moderna series) 04/23/2021    Foot Exam Q1  10/06/2022    A1C test (Diabetic or Prediabetic)  02/03/2023    Lipid Screen  01/14/2023

## 2023-02-04 LAB
CHOLEST SERPL-MCNC: 181 MG/DL
EST. AVERAGE GLUCOSE BLD GHB EST-MCNC: 249 MG/DL
HBA1C MFR BLD: 10.3 % (ref 4–5.6)
HDLC SERPL-MCNC: 45 MG/DL
HDLC SERPL: 4 (ref 0–5)
LDLC SERPL CALC-MCNC: ABNORMAL MG/DL (ref 0–100)
LDLC SERPL DIRECT ASSAY-MCNC: 75 MG/DL (ref 0–100)
TRIGL SERPL-MCNC: 535 MG/DL (ref ?–150)
TSH SERPL DL<=0.05 MIU/L-ACNC: 2.04 UIU/ML (ref 0.36–3.74)
VLDLC SERPL CALC-MCNC: ABNORMAL MG/DL

## 2023-02-08 PROBLEM — E11.21 TYPE 2 DIABETES WITH NEPHROPATHY (HCC): Status: RESOLVED | Noted: 2019-01-16 | Resolved: 2023-02-08

## 2023-02-13 ENCOUNTER — CLINICAL SUPPORT (OUTPATIENT)
Dept: SURGERY | Age: 38
End: 2023-02-13

## 2023-02-13 DIAGNOSIS — E11.65 UNCONTROLLED TYPE 2 DIABETES MELLITUS WITH HYPERGLYCEMIA (HCC): Primary | ICD-10-CM

## 2023-02-13 DIAGNOSIS — E66.01 OBESITY, MORBID (HCC): Primary | ICD-10-CM

## 2023-02-13 NOTE — PROGRESS NOTES
[facility]  Supervised Weight Loss     Date:   2023    Patient's Name: Huang Martinez  : 1985    Insurance:  +                                  Session: 5 of  6  Surgery: Gastric Bypass                    Surgeon:  Boom Menard M.D. Height: 62\"                 Reported Weight:    340      Lbs. BMI: 62             Pounds Lost since last month: 0#               Pounds Gained since last month: 6     Starting Weight: 342#                       Previous Months Weight: 334#  Overall Pounds Lost: 2#                   Overall Pounds Gained: 0     Other Pertinent Information: Today's appointment was completed over the phone. Pt recommended to lose 15# while in the program.     Smoking Status:  smokes marijuana and reports she has reduced frequency  Alcohol Intake: 2 drinks on special occasions      I have reviewed with pt the guidelines of the supervised wt loss program.  Pt understands the expectations of some wt loss during the program and that wt gain could delay the process. I have also explained that appointments need to be consecutive and missing an appointment may result in starting over. Pt has received this information in writing. Changes that patient has made since last month include:  Portion control, healthier food choices. Maintaining exercise. Eating Habits and Behaviors  General healthy eating guidelines were also discussed. Pts were instructed that their plate should be made up 1/2 plate coming from non-starchy vegetables, 1/4 coming from lean meat, and 1/4 of their plate coming from carbohydrates, including fruits, starches, or milk. We discussed measuring meals to 1/2 cup total per meal after surgery. Drinking only calorie-free, sugar-free and non-carbonated beverages. We discussed the importance of drinking 64 ounces of fluid per day to prevent dehydration post-operatively.       Diet recall - Breakfast is a Premier or Slim Fast protein shake, snack is a yogurt, jello, peanut butter crackers. Lunch is home-made sandwich and baked chips/crackers or veggie chips, fruit. Betancourt Nidia is chicken/fish/turkey and vegetables and starchy food in moderation. Cookies/sweets on occasion. Popcorn at night or veggies with [facility] VJ dressing. Drinking only water. Occasionally decaf coffee and tea with sugar free creamer and sugar substitute. Physical Activity/Exercise  We talked about the importance of increasing daily physical activity and beginning to develop an exercise regimen/routine. We talked about exercise as being an important part of long term weight loss after surgery. Comments:  During class, I discussed with patient the importance of getting into an exercise routine. Pt is currently doing [employer] videos at home for activity. Is also considering a [employer] fitness membership. Pt has been encouraged to maintain and increase as tolerated. Behavior Modification       A behavior modification lesson was provided with an emphasis on developing mindful eating behaviors. We talked about how to eat more mindfully and identify emotional eating triggers. Tips and recommendations for how to make these changes were provided. Pt was encouraged to keep a food journal and record what they were taking in daily. Patient's reported eating behaviors: Pt reports being a slow eater. Currently using a straw to drink fluids d/t [place] Palsy. States she can drink from a water bottle without a straw. Overall Assessment: Pt demonstrates maintenance of previously made changes. Will continue to assess. Patient-Set Goals:   1. Nutrition - maintain current eating habits with protein at each meal/snack   2. Exercise - maintain and increase as tolerated.    3. Behavior -practice not drinking with meals 30/30 rule, purchase water bottle without a straw built in    [facility]  2/13/2023

## 2023-03-13 ENCOUNTER — CLINICAL SUPPORT (OUTPATIENT)
Dept: SURGERY | Age: 38
End: 2023-03-13

## 2023-03-13 DIAGNOSIS — E66.01 OBESITY, MORBID (HCC): Primary | ICD-10-CM

## 2023-03-13 NOTE — PROGRESS NOTES
[facility] at [facility]  Supervised Weight Loss     Date:   3/13/2023    Patient's Name: Jonathan Nava  : 1985    Insurance:  +                                  Session: 6 of  6  Surgery: Gastric Bypass                    Surgeon:  Torsten Jaimes M.D. Height: 62\"                 Reported Weight:    340      Lbs. BMI: 62             Pounds Lost since last month: 0#               Pounds Gained since last month: 0     Starting Weight: 342#                       Previous Months Weight: 340#  Overall Pounds Lost: 2#                   Overall Pounds Gained: 0     Other Pertinent Information: Today's appointment was completed over the phone. Pt recommended to lose 15# while in the program.     Smoking Status:  currently smoking marijuana \"1-2 puffs\" 1-2 times per week  Alcohol Intake: 2 drinks on special occasions    I have reviewed with pt the guidelines of the supervised wt loss program.  Pt understands the expectations of some wt loss during the program and that wt gain could delay the process. I have also explained that appointments need to be consecutive and missing an appointment may result in starting over. Pt has received this information in writing. Changes that patient has made since last month include:  Drinking 64 oz water daily and practicing not drinking with meals (30/30 rule)      Eating Habits and Behaviors  General healthy eating guidelines were discussed. A nutrition lesson was presented on portion control. Patients were instructed implement portion control now using the balanced plate method (1/2 plate non-starchy vegetables, 1/4 plate lean meat, and 1/4 plate whole grains and to include fruit and/or milk at meals or snack). We discussed measuring meals to 1/2 cup total per meal after surgery and appropriate portion progression long term.                        Patient's current diet habits include: Pt is eating 3 meals per day. Breakfast is a protein shake (Premier Protein or Slim Fast). Lunch is home-made sandwich and baked chips/crackers or veggie chips, fruit. Pia Bsesy is chicken/fish/turkey and vegetables and starchy food in moderation. Pt is eating refined carbohydrate foods (bread, pasta, rice, potatoes) in moderation. Pt is eating sweets/desserts none. Pt is using healthy cooking methods. Pt is eating meals prepared outside of the home 1-3 times per week. Pt is drinking 64 oz water. Pt reports no to emotional eating. Reading food labels for calories, sugar, fat and sodium. Physical Activity/Exercise  We talked about the importance of increasing daily physical activity and beginning to develop an exercise regimen/routine. We talked about exercise as being an important part of long term weight loss after surgery. Comments:  During class, I discussed with patient the importance of getting into an exercise routine. Pt is currently doing [employer] videos at home for 45 minutes, 3 times per week for activity. Pt has been encouraged to maintain and increase as tolerated. Behavior Modification       We talked about how to eat more mindfully. Tips and recommendations for how to make these changes were provided. Pt was encouraged to keep a food journal and record what they were taking in daily. Overall Assessment: Pt demonstrates some small lifestyle changes evidenced by reported changes and small amounts of wt loss. Pt has been encouraged to continue to exercise and increase as tolerated to promote wt loss. Pt has also been reminded of the smoking cessation requirement 3 months prior to surgery as she is still currently smoking marijuana. Demonstrates good understanding of nutrition guidelines and otherwise appears to be an appropriate candidate at this time. Patient-Set Goals:   1. Nutrition - maintain current eating habits as described/reported   2.  Exercise - continue with exercise and increase as tolerated   3.  Behavior -continue to review nutrition guidelines for gastric bypass including vitamin information     Rosemary Phillips, KT  3/13/2023

## 2023-05-03 DIAGNOSIS — E11.65 CONTROLLED TYPE 2 DIABETES MELLITUS WITH HYPERGLYCEMIA, WITHOUT LONG-TERM CURRENT USE OF INSULIN (HCC): Primary | ICD-10-CM

## 2023-05-04 ENCOUNTER — VIRTUAL VISIT (OUTPATIENT)
Dept: FAMILY MEDICINE CLINIC | Age: 38
End: 2023-05-04
Payer: MEDICAID

## 2023-05-04 DIAGNOSIS — B37.31 VAGINAL YEAST INFECTION: ICD-10-CM

## 2023-05-04 DIAGNOSIS — E11.65 UNCONTROLLED TYPE 2 DIABETES MELLITUS WITH HYPERGLYCEMIA (HCC): Primary | ICD-10-CM

## 2023-05-04 DIAGNOSIS — E66.01 OBESITY, MORBID (HCC): ICD-10-CM

## 2023-05-04 PROCEDURE — 99214 OFFICE O/P EST MOD 30 MIN: CPT | Performed by: STUDENT IN AN ORGANIZED HEALTH CARE EDUCATION/TRAINING PROGRAM

## 2023-05-04 PROCEDURE — 3046F HEMOGLOBIN A1C LEVEL >9.0%: CPT | Performed by: STUDENT IN AN ORGANIZED HEALTH CARE EDUCATION/TRAINING PROGRAM

## 2023-05-04 RX ORDER — FLUCONAZOLE 150 MG/1
150 TABLET ORAL DAILY
Qty: 1 TABLET | Refills: 0 | Status: SHIPPED | OUTPATIENT
Start: 2023-05-04 | End: 2023-05-05

## 2023-05-04 RX ORDER — DULAGLUTIDE 3 MG/.5ML
3 INJECTION, SOLUTION SUBCUTANEOUS
Qty: 4 EACH | Refills: 2 | Status: SHIPPED | OUTPATIENT
Start: 2023-05-04

## 2023-07-19 ENCOUNTER — TELEMEDICINE (OUTPATIENT)
Age: 38
End: 2023-07-19

## 2023-07-19 DIAGNOSIS — B37.31 YEAST VAGINITIS: Primary | ICD-10-CM

## 2023-07-19 DIAGNOSIS — E11.65 UNCONTROLLED TYPE 2 DIABETES MELLITUS WITH HYPERGLYCEMIA (HCC): ICD-10-CM

## 2023-07-19 DIAGNOSIS — L30.9 DERMATITIS: ICD-10-CM

## 2023-07-19 PROCEDURE — 99214 OFFICE O/P EST MOD 30 MIN: CPT | Performed by: STUDENT IN AN ORGANIZED HEALTH CARE EDUCATION/TRAINING PROGRAM

## 2023-07-19 PROCEDURE — 3046F HEMOGLOBIN A1C LEVEL >9.0%: CPT | Performed by: STUDENT IN AN ORGANIZED HEALTH CARE EDUCATION/TRAINING PROGRAM

## 2023-07-19 RX ORDER — FLUCONAZOLE 150 MG/1
150 TABLET ORAL ONCE
Qty: 1 TABLET | Refills: 0 | Status: SHIPPED | OUTPATIENT
Start: 2023-07-19 | End: 2023-07-19

## 2023-07-19 NOTE — PROGRESS NOTES
units/kg/d would be 30 units twice daily of NPH)      Leila was seen today for vaginitis. Diagnoses and all orders for this visit:    Yeast vaginitis  -     fluconazole (DIFLUCAN) 150 MG tablet; Take 1 tablet by mouth once for 1 dose    Dermatitis    Uncontrolled type 2 diabetes mellitus with hyperglycemia ([address])           Filomena Talbot, was evaluated through a synchronous (real-time) audio-video encounter. The patient (or guardian if applicable) is aware that this is a billable service, which includes applicable co-pays. This Virtual Visit was conducted with patient's (and/or legal guardian's) consent. Patient identification was verified, and a caregiver was present when appropriate. The patient was located at Home: [address] [address]  Provider was located at Home ([facility]         Total time spent for this encounter: Not billed by time    --Maude Tolentino DO on 7/19/2023 at 4:32 PM    An electronic signature was used to authenticate this note. History   Patients past medical, surgical and family histories were reviewed and updated.       Past Medical History:   Diagnosis Date    Diabetes ([address])     Gallstones     History of chickenpox 06/11/2010    Hyperlipidemia 06/24/2014    Hypertension     Not on any meds    Major depressive disorder in full remission ([address]) 07/14/2022    SALGADO (nonalcoholic steatohepatitis) 01/13/2016     Past Surgical History:   Procedure Laterality Date    CHOLECYSTECTOMY  7/2011     Family History   Problem Relation Age of Onset    Heart Failure Father     Heart Disease Maternal Aunt     Cancer Maternal Grandfather         prostate cancer    Hypertension Maternal Grandfather     Diabetes Maternal Grandfather     Emphysema Maternal Grandfather     Hypertension Maternal Grandmother     Hypertension Father     Diabetes Father     High Cholesterol Mother      Social History     Socioeconomic History    Marital status: Single     Spouse name: Not

## 2023-08-01 ENCOUNTER — PHARMACY VISIT (OUTPATIENT)
Age: 38
End: 2023-08-01

## 2023-08-01 ENCOUNTER — OFFICE VISIT (OUTPATIENT)
Age: 38
End: 2023-08-01

## 2023-08-01 VITALS
DIASTOLIC BLOOD PRESSURE: 86 MMHG | SYSTOLIC BLOOD PRESSURE: 137 MMHG | WEIGHT: 293 LBS | HEART RATE: 90 BPM | BODY MASS INDEX: 58.89 KG/M2

## 2023-08-01 DIAGNOSIS — I15.2 HYPERTENSION SECONDARY TO ENDOCRINE DISORDERS: ICD-10-CM

## 2023-08-01 DIAGNOSIS — E11.65 UNCONTROLLED TYPE 2 DIABETES MELLITUS WITH HYPERGLYCEMIA (HCC): Primary | ICD-10-CM

## 2023-08-01 PROCEDURE — 99214 OFFICE O/P EST MOD 30 MIN: CPT | Performed by: STUDENT IN AN ORGANIZED HEALTH CARE EDUCATION/TRAINING PROGRAM

## 2023-08-01 PROCEDURE — 3046F HEMOGLOBIN A1C LEVEL >9.0%: CPT | Performed by: STUDENT IN AN ORGANIZED HEALTH CARE EDUCATION/TRAINING PROGRAM

## 2023-08-01 RX ORDER — PEN NEEDLE, DIABETIC 32GX 5/32"
1 NEEDLE, DISPOSABLE MISCELLANEOUS DAILY
Qty: 100 EACH | Refills: 3 | Status: SHIPPED | OUTPATIENT
Start: 2023-08-01

## 2023-08-01 RX ORDER — INSULIN GLARGINE 100 [IU]/ML
30 INJECTION, SOLUTION SUBCUTANEOUS NIGHTLY
Qty: 5 ADJUSTABLE DOSE PRE-FILLED PEN SYRINGE | Refills: 2 | Status: SHIPPED | OUTPATIENT
Start: 2023-08-01

## 2023-08-01 NOTE — PROGRESS NOTES
Pharmacy Progress Note - Diabetes Management    S/O: Ms. Cirilo Ellsworth is a 30 y.o. female, referred by Dr. Amandeep Robins MD, with a PMH of HTN, gallstones, SALGAOD, GERD, T2DM, OA, HLD, obesity, depression, vit D deficiency, was seen today for diabetes management. Patient's last A1c was 12.0% (May 2023). Interim update: Pt arrives to clinic today to f/up on DM management. She has had a hard time affording meds as she has recently lost her Medicaid. Treated for yeast infxn d/t uncontrolled diabetes. Pt arrives to clinic today and states that she has been without Trulicity and Glipizide x2 months. She has been taking Metformin 500 mg 2 tabs BID. Fasting BG today is 357 in office. Yesterday fasting was 203 mg/dL. She was able to apply to another insurance plan - [employer]. States coverage started today (23). However, she doesn't have insurance cards and doesn't have a phone number to call about coverage. Current anti-hyperglycemic regimen includes:    - Metformin 500 mg 2 tabs BID    ROS:  Today, Pt endorses:  - Symptoms of Hyperglycemia: fatigue  - Symptoms of Hypoglycemia: none    Self Monitoring Blood Glucose (SMBG) or CGM:  - Brought in home glucometer/blood glucose log/CGM reader today:  no  - Fasting B mg/dL (in office)  - Fasting BG yesterday: 203 mg/dL      Vitals:   Wt Readings from Last 3 Encounters:   23 (!) 340 lb (154.2 kg)   22 (!) 342 lb (155.1 kg)   22 (!) 337 lb (152.9 kg)     BP Readings from Last 3 Encounters:   23 129/83   22 138/88   22 (!) 160/90     Pulse Readings from Last 3 Encounters:   23 94   22 96   22 80       Past Medical History:   Diagnosis Date    Diabetes ([facility])     Gallstones     History of chickenpox 2010    Hyperlipidemia 2014    Hypertension     Not on any meds    Major depressive disorder in full remission ([address]) 2022    SALGADO (nonalcoholic steatohepatitis) 2016

## 2023-08-01 NOTE — PROGRESS NOTES
[facility]      Chief Complaint:     Chief Complaint   Patient presents with    Diabetes       Romina Mcadams is a 30 y.o. female that presents for: DM      Assessment/Plan:     Ellyn Mcneill was seen today for diabetes. 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 female that presents for:    DM:  Uncontrolled last A1c 12, recently got [facility] back. Doesn't have pharmacy card yet. Has done insulin previously. Taking Metformin 1,000mg BID  Starting sample of Toujeo injection now in clinic as fasting blood sugar is in 300s here; 30 units. Will try to restart Trulicity 4.8SV with new insurance. Follow up with Erika in 1 week     Health Maintenance:  Health Maintenance Due   Topic Date Due    Varicella vaccine (1 of 2 - 2-dose childhood series) Never done    Hepatitis C screen  Never done    Hepatitis B vaccine (1 of 3 - Risk 3-dose series) Never done    DTaP/Tdap/Td vaccine (1 - Tdap) Never done    COVID-19 Vaccine (3 - Booster for Moderna series) 04/23/2021    Diabetic foot exam  10/06/2022    Flu vaccine (1) 08/01/2023    A1C test (Diabetic or Prediabetic)  08/03/2023        ROS:   See HPI      Past medical history, social history, and medications personally reviewed. Allergies personally reviewed. Allergies   Allergen Reactions    Penicillins      Other reaction(s): Unable to Obtain  \"Childhood allergy\"  Ancef 2g given pre-op with no reaction noted to test dose. Objective:   Vitals reviewed. There were no vitals taken for this visit. Wt Readings from Last 3 Encounters:   08/01/23 (!) 322 lb (146.1 kg)   02/03/23 (!) 340 lb (154.2 kg)   12/16/22 (!) 342 lb (155.1 kg)        Physical Exam    Vitals: Reviewed. General: AO x 3.

## 2023-08-01 NOTE — PATIENT INSTRUCTIONS
Your A1c was 12.0% which was uncontrolled. START Toujeo U-300 30 units daily    CALL me with your insurance information as soon as we find out. Pharmacist Contact Information:  Kayode Lanza, PharmD, [facility]  Work Cell: [phone]      80    I sent Basaglar insulin to your pharmacy.    When you get your insurance coverage, your regimen will be:   - Basaglar 30 units daily with a new pen needle each time  - Trulicity 1.5 mg weekly  - Metformin 500 mg 2 tablets twice daily with meals    Blood Sugar Goal  Fastin-130 mg/dL  1-2 after meals: Less than 180 mg/dL    Carbohydrate Goals  Meal: 30-45 grams   Snacks: 15 grams

## 2023-08-08 ENCOUNTER — NURSE TRIAGE (OUTPATIENT)
Dept: OTHER | Facility: CLINIC | Age: 38
End: 2023-08-08

## 2023-08-08 NOTE — TELEPHONE ENCOUNTER
Location of patient: [address] [facility] [place] call from Zaid Jauregui at [facility] with [employer]. Subjective: Caller states \"Vulva pain and discomfort\"     Current Symptoms: burned vulva skin with hair removal cream, reports swelling. States hair is growing back. Denies any open wounds. Unsure if skin looks irritated or red. States it was blistered originally when this first happened. Onset: 1 month ago;     Associated Symptoms:     Pain Severity: 9/10; burning, itching; constant    Temperature: denies     What has been tried: ointment, aloe vera    LMP:  last month  Pregnant: No    Recommended disposition: Go to Office Now    Care advice provided, patient verbalizes understanding; denies any other questions or concerns; instructed to call back for any new or worsening symptoms. Patient/Caller agrees with recommended disposition; writer provided warm transfer to [facility] for appointment scheduling    Attention Provider: Thank you for allowing me to participate in the care of your patient. The patient was connected to triage in response to information provided to the ECC/PSC. Please do not respond through this encounter as the response is not directed to a shared pool.       Reason for Disposition   SEVERE pain and not improved 2 hours after pain medicine    Protocols used: Vaginal Symptoms-ADULT-[place]

## 2023-08-10 ENCOUNTER — TELEPHONE (OUTPATIENT)
Age: 38
End: 2023-08-10

## 2023-08-10 NOTE — TELEPHONE ENCOUNTER
Pharmacy Progress Note     Pt states she was able to sort out her insurance. She missed her PharmD appt on 8/7/23. Rescheduled telephone visit for 8/11/23 at 11AM.    There are no discontinued medications. No orders of the defined types were placed in this encounter.         Diane Phillips PharmD, BCGP  Clinical Pharmacist Specialist      For Pharmacy Admin Tracking Only    Program: Medical Group  CPA in place:  Yes  Recommendation Provided To: Patient/Caregiver: 1 via Telephone  Intervention Detail: Scheduled Appointment  Intervention Accepted By: Patient/Caregiver: 1  Time Spent (min): 5

## 2023-08-25 ENCOUNTER — TELEPHONE (OUTPATIENT)
Age: 38
End: 2023-08-25

## 2023-08-25 NOTE — TELEPHONE ENCOUNTER
Called, left vm for pt to return call to office. Message left for patient to call due to her concerns and make appt with an available provider or go to Urgent Care if symptoms cont. To get worse.

## 2023-08-25 NOTE — TELEPHONE ENCOUNTER
Pt called, said she has a rash around bikini area. She went to the ED 8/18/23, they gave her cephalexin 500 mg 4 x daily but it made her sick and she stopped taking it. Pt was wondering if the could prescribe something else.     BCB# [phone]

## 2023-09-12 ENCOUNTER — TELEPHONE (OUTPATIENT)
Age: 38
End: 2023-09-12

## 2023-09-12 ENCOUNTER — TELEMEDICINE (OUTPATIENT)
Age: 38
End: 2023-09-12
Payer: COMMERCIAL

## 2023-09-12 DIAGNOSIS — E11.65 UNCONTROLLED TYPE 2 DIABETES MELLITUS WITH HYPERGLYCEMIA (HCC): ICD-10-CM

## 2023-09-12 DIAGNOSIS — L73.9 FOLLICULITIS: Primary | ICD-10-CM

## 2023-09-12 PROCEDURE — 99214 OFFICE O/P EST MOD 30 MIN: CPT | Performed by: NURSE PRACTITIONER

## 2023-09-12 PROCEDURE — 3046F HEMOGLOBIN A1C LEVEL >9.0%: CPT | Performed by: NURSE PRACTITIONER

## 2023-09-12 RX ORDER — CLINDAMYCIN PHOSPHATE 10 UG/ML
LOTION TOPICAL
Qty: 60 G | Refills: 0 | Status: SHIPPED | OUTPATIENT
Start: 2023-09-12 | End: 2023-10-12

## 2023-09-12 RX ORDER — DULAGLUTIDE 1.5 MG/.5ML
1.5 INJECTION, SOLUTION SUBCUTANEOUS WEEKLY
Qty: 2 ML | Refills: 2 | Status: SHIPPED | OUTPATIENT
Start: 2023-09-12

## 2023-09-12 NOTE — TELEPHONE ENCOUNTER
Identified patient with two patient identifiers (name and ). Reviewed chart in preparation for encounter and have obtained necessary documentation. Called pt to discuss 2 remaining insurance requirements. -UGI  -PCP Support Form  Pt stated that she no longer has medicaid and wanted to check into to her other insurance to see about bariatric coverage and would call us back.

## 2023-09-12 NOTE — PROGRESS NOTES
Assessment/Plan:     1. Folliculitis  -     clindamycin (CLEOCIN-T) 1 % lotion; Apply topically 2 times daily. , Disp-60 g, R-0, Normal  Treatment as above. Has follow up next week with PCP. 2. Uncontrolled type 2 diabetes mellitus with hyperglycemia (HCC)  -     dulaglutide (TRULICITY) 1.5 TJ/3.7UQ SC injection; Inject 0.5 mLs into the skin once a week, Disp-2 mL, R-2Normal   Medication noncompliance. Insulin is available for  at the pharmacy and patient was notified. Resent Trulicity to pharmacy which she will restart as well. Antonieta Miller, was evaluated through a synchronous (real-time) audio-video encounter. The patient (or guardian if applicable) is aware that this is a billable service, which includes applicable co-pays. This Virtual Visit was conducted with patient's (and/or legal guardian's) consent. Patient identification was verified, and a caregiver was present when appropriate. The patient was located at Home: [address] [address]  Provider was located at [facility] (Appt Dept): [address] [facility],  [address]       Total time spent for this encounter:  30-39 minutes. --TYREE Isabel - NP on 9/19/2023 at 10:12 AM    An electronic signature was used to authenticate this note. No follow-ups on file. Discussed expected course/resolution/complications of diagnosis in detail with patient. Medication risks/benefits/costs/interactions/alternatives discussed with patient. Pt was given after visit summary which includes diagnoses, current medications & vitals. Pt expressed understanding with the diagnosis and plan          Subjective:      Antonieta Miller is a 30 y.o. female who presents for had concerns including Rash. She is concerned about a 2-month history of rash in the groin. Symptoms include small red bumps and associated itching. This is her first evaluation. She reports her boyfriend has similar symptoms.   Has

## 2023-09-19 ASSESSMENT — ENCOUNTER SYMPTOMS: SHORTNESS OF BREATH: 0

## 2023-09-20 ENCOUNTER — OFFICE VISIT (OUTPATIENT)
Age: 38
End: 2023-09-20
Payer: COMMERCIAL

## 2023-09-20 VITALS
HEIGHT: 62 IN | BODY MASS INDEX: 53.92 KG/M2 | DIASTOLIC BLOOD PRESSURE: 78 MMHG | TEMPERATURE: 97.7 F | SYSTOLIC BLOOD PRESSURE: 118 MMHG | OXYGEN SATURATION: 95 % | HEART RATE: 92 BPM | WEIGHT: 293 LBS

## 2023-09-20 DIAGNOSIS — E11.65 UNCONTROLLED TYPE 2 DIABETES MELLITUS WITH HYPERGLYCEMIA (HCC): Primary | ICD-10-CM

## 2023-09-20 DIAGNOSIS — B37.2 CANDIDAL SKIN INFECTION: ICD-10-CM

## 2023-09-20 DIAGNOSIS — L02.211 ABSCESS OF SKIN OF ABDOMEN: ICD-10-CM

## 2023-09-20 LAB
ALBUMIN SERPL-MCNC: 3.4 G/DL (ref 3.5–5)
ALBUMIN/GLOB SERPL: 0.9 (ref 1.1–2.2)
ALP SERPL-CCNC: 145 U/L (ref 45–117)
ALT SERPL-CCNC: 18 U/L (ref 12–78)
ANION GAP SERPL CALC-SCNC: 5 MMOL/L (ref 5–15)
AST SERPL-CCNC: 10 U/L (ref 15–37)
BILIRUB SERPL-MCNC: 0.3 MG/DL (ref 0.2–1)
BUN SERPL-MCNC: 9 MG/DL (ref 6–20)
BUN/CREAT SERPL: 13 (ref 12–20)
CALCIUM SERPL-MCNC: 9.8 MG/DL (ref 8.5–10.1)
CHLORIDE SERPL-SCNC: 102 MMOL/L (ref 97–108)
CO2 SERPL-SCNC: 28 MMOL/L (ref 21–32)
CREAT SERPL-MCNC: 0.72 MG/DL (ref 0.55–1.02)
GLOBULIN SER CALC-MCNC: 3.8 G/DL (ref 2–4)
GLUCOSE SERPL-MCNC: 428 MG/DL (ref 65–100)
POTASSIUM SERPL-SCNC: 4.3 MMOL/L (ref 3.5–5.1)
PROT SERPL-MCNC: 7.2 G/DL (ref 6.4–8.2)
SODIUM SERPL-SCNC: 135 MMOL/L (ref 136–145)

## 2023-09-20 PROCEDURE — 99214 OFFICE O/P EST MOD 30 MIN: CPT | Performed by: STUDENT IN AN ORGANIZED HEALTH CARE EDUCATION/TRAINING PROGRAM

## 2023-09-20 PROCEDURE — 3078F DIAST BP <80 MM HG: CPT | Performed by: STUDENT IN AN ORGANIZED HEALTH CARE EDUCATION/TRAINING PROGRAM

## 2023-09-20 PROCEDURE — 3074F SYST BP LT 130 MM HG: CPT | Performed by: STUDENT IN AN ORGANIZED HEALTH CARE EDUCATION/TRAINING PROGRAM

## 2023-09-20 PROCEDURE — 3046F HEMOGLOBIN A1C LEVEL >9.0%: CPT | Performed by: STUDENT IN AN ORGANIZED HEALTH CARE EDUCATION/TRAINING PROGRAM

## 2023-09-20 RX ORDER — NYSTATIN 100000 U/G
CREAM TOPICAL
Qty: 90 G | Refills: 0 | Status: SHIPPED | OUTPATIENT
Start: 2023-09-20

## 2023-09-20 RX ORDER — FLUCONAZOLE 150 MG/1
150 TABLET ORAL ONCE
Qty: 1 TABLET | Refills: 0 | Status: SHIPPED | OUTPATIENT
Start: 2023-09-20 | End: 2023-09-20

## 2023-09-20 SDOH — ECONOMIC STABILITY: FOOD INSECURITY: WITHIN THE PAST 12 MONTHS, YOU WORRIED THAT YOUR FOOD WOULD RUN OUT BEFORE YOU GOT MONEY TO BUY MORE.: NEVER TRUE

## 2023-09-20 SDOH — ECONOMIC STABILITY: HOUSING INSECURITY
IN THE LAST 12 MONTHS, WAS THERE A TIME WHEN YOU DID NOT HAVE A STEADY PLACE TO SLEEP OR SLEPT IN A SHELTER (INCLUDING NOW)?: NO

## 2023-09-20 SDOH — ECONOMIC STABILITY: FOOD INSECURITY: WITHIN THE PAST 12 MONTHS, THE FOOD YOU BOUGHT JUST DIDN'T LAST AND YOU DIDN'T HAVE MONEY TO GET MORE.: SOMETIMES TRUE

## 2023-09-20 SDOH — ECONOMIC STABILITY: INCOME INSECURITY: HOW HARD IS IT FOR YOU TO PAY FOR THE VERY BASICS LIKE FOOD, HOUSING, MEDICAL CARE, AND HEATING?: SOMEWHAT HARD

## 2023-09-20 ASSESSMENT — PATIENT HEALTH QUESTIONNAIRE - PHQ9
7. TROUBLE CONCENTRATING ON THINGS, SUCH AS READING THE NEWSPAPER OR WATCHING TELEVISION: 0
4. FEELING TIRED OR HAVING LITTLE ENERGY: 2
3. TROUBLE FALLING OR STAYING ASLEEP: 0
SUM OF ALL RESPONSES TO PHQ QUESTIONS 1-9: 2
SUM OF ALL RESPONSES TO PHQ9 QUESTIONS 1 & 2: 0
8. MOVING OR SPEAKING SO SLOWLY THAT OTHER PEOPLE COULD HAVE NOTICED. OR THE OPPOSITE, BEING SO FIGETY OR RESTLESS THAT YOU HAVE BEEN MOVING AROUND A LOT MORE THAN USUAL: 0
9. THOUGHTS THAT YOU WOULD BE BETTER OFF DEAD, OR OF HURTING YOURSELF: 0
1. LITTLE INTEREST OR PLEASURE IN DOING THINGS: 0
10. IF YOU CHECKED OFF ANY PROBLEMS, HOW DIFFICULT HAVE THESE PROBLEMS MADE IT FOR YOU TO DO YOUR WORK, TAKE CARE OF THINGS AT HOME, OR GET ALONG WITH OTHER PEOPLE: 0
SUM OF ALL RESPONSES TO PHQ QUESTIONS 1-9: 2
2. FEELING DOWN, DEPRESSED OR HOPELESS: 0
5. POOR APPETITE OR OVEREATING: 0
6. FEELING BAD ABOUT YOURSELF - OR THAT YOU ARE A FAILURE OR HAVE LET YOURSELF OR YOUR FAMILY DOWN: 0

## 2023-09-20 NOTE — PATIENT INSTRUCTIONS
Please send name for podiatrist through [employer] and I will put referral in for you. Bring all of your medication with you to your visit next week. Apply warm compresses to abscess on your abdomen twice daily until I see you. Come in sooner for fever, chills.  If still there I may need to drain it next week

## 2023-09-20 NOTE — PROGRESS NOTES
Cristóbal Bowsell  30 y.o. female  1985  [address] [address]  [id number]     [address] [facility]       Chief Complaint:  Chief Complaint   Patient presents with    Hypertension    Cholesterol Problem    Diabetes    Rash     Private area        Source: self,, the medical record     Subjective  Cristóbal Boswell is an 30 y.o. female who presents for DM. Last a1c 5/2023 12.0%. Bypass not happening 2/2 insurance. Taking metformin 925DT BID, trulicity no longer covered - started on the basaglar 30U (states she ran out of this last week)picked up new rx but not using it for about 1 week. Stopped zoloft as felt like it was contributing more to her anxiety/depression. No one in family diagnosed with BPD but with some features per patient report. Genital Rash  Used hair removal cream on bikini area. Dx with folliculitis started on clindamycin but stopped 2/2 it was irritating the area. Started OTC hydrocortisone in the meantime. Having intermittent itch. BF smokes pot - states this is were smell is coming form. Denies other drug use. ROS  Negative except what is mentioned in HPI      Allergies - reviewed: Allergies   Allergen Reactions    Penicillins      Other reaction(s): Unable to Obtain  \"Childhood allergy\"  Ancef 2g given pre-op with no reaction noted to test dose. Medications - reviewed:   Current Outpatient Medications   Medication Sig    BLOOD GLUCOSE MONITOR Fill with strips and lancets for bid testing. Uncontrolled type 2 DM E11.21    blood glucose test strips (ASCENSIA AUTODISC VI;ONE TOUCH ULTRA TEST VI) strip Any brand, for uncontrolled DM E11.21 check up to 3x daily    nystatin (MYCOSTATIN) 752260 UNIT/GM cream Apply topically 2 times daily. fluconazole (DIFLUCAN) 150 MG tablet Take 1 tablet by mouth once for 1 dose    clindamycin (CLEOCIN-T) 1 % lotion Apply topically 2 times daily.     insulin glargine (BASAGLAR KWIKPEN) 100 UNIT/ML

## 2023-09-21 LAB
EST. AVERAGE GLUCOSE BLD GHB EST-MCNC: 324 MG/DL
HBA1C MFR BLD: 12.9 % (ref 4–5.6)

## 2023-09-22 ENCOUNTER — TELEPHONE (OUTPATIENT)
Age: 38
End: 2023-09-22

## 2023-09-22 NOTE — TELEPHONE ENCOUNTER
Chief Complaint   Patient presents with    Prior Authorization     Started for Trulicty though Cover My Meds

## 2023-09-27 ENCOUNTER — PROCEDURE VISIT (OUTPATIENT)
Age: 38
End: 2023-09-27
Payer: COMMERCIAL

## 2023-09-27 VITALS
DIASTOLIC BLOOD PRESSURE: 84 MMHG | BODY MASS INDEX: 53.92 KG/M2 | TEMPERATURE: 96.8 F | RESPIRATION RATE: 18 BRPM | WEIGHT: 293 LBS | OXYGEN SATURATION: 98 % | HEART RATE: 85 BPM | SYSTOLIC BLOOD PRESSURE: 129 MMHG | HEIGHT: 62 IN

## 2023-09-27 DIAGNOSIS — L02.32 BOIL OF BUTTOCK: ICD-10-CM

## 2023-09-27 DIAGNOSIS — E11.65 UNCONTROLLED TYPE 2 DIABETES MELLITUS WITH HYPERGLYCEMIA (HCC): ICD-10-CM

## 2023-09-27 DIAGNOSIS — Z23 ENCOUNTER FOR IMMUNIZATION: ICD-10-CM

## 2023-09-27 DIAGNOSIS — L60.0 INGROWN LEFT GREATER TOENAIL: ICD-10-CM

## 2023-09-27 DIAGNOSIS — Z79.4 TYPE 2 DIABETES MELLITUS WITH HYPERGLYCEMIA, WITH LONG-TERM CURRENT USE OF INSULIN (HCC): Primary | ICD-10-CM

## 2023-09-27 DIAGNOSIS — E11.65 TYPE 2 DIABETES MELLITUS WITH HYPERGLYCEMIA, WITH LONG-TERM CURRENT USE OF INSULIN (HCC): Primary | ICD-10-CM

## 2023-09-27 PROCEDURE — 90471 IMMUNIZATION ADMIN: CPT | Performed by: STUDENT IN AN ORGANIZED HEALTH CARE EDUCATION/TRAINING PROGRAM

## 2023-09-27 PROCEDURE — 99214 OFFICE O/P EST MOD 30 MIN: CPT | Performed by: STUDENT IN AN ORGANIZED HEALTH CARE EDUCATION/TRAINING PROGRAM

## 2023-09-27 PROCEDURE — 3046F HEMOGLOBIN A1C LEVEL >9.0%: CPT | Performed by: STUDENT IN AN ORGANIZED HEALTH CARE EDUCATION/TRAINING PROGRAM

## 2023-09-27 PROCEDURE — 3079F DIAST BP 80-89 MM HG: CPT | Performed by: STUDENT IN AN ORGANIZED HEALTH CARE EDUCATION/TRAINING PROGRAM

## 2023-09-27 PROCEDURE — 90674 CCIIV4 VAC NO PRSV 0.5 ML IM: CPT | Performed by: STUDENT IN AN ORGANIZED HEALTH CARE EDUCATION/TRAINING PROGRAM

## 2023-09-27 PROCEDURE — 3074F SYST BP LT 130 MM HG: CPT | Performed by: STUDENT IN AN ORGANIZED HEALTH CARE EDUCATION/TRAINING PROGRAM

## 2023-09-27 RX ORDER — DOXYCYCLINE HYCLATE 100 MG
100 TABLET ORAL 2 TIMES DAILY
Qty: 20 TABLET | Refills: 0 | Status: SHIPPED | OUTPATIENT
Start: 2023-09-27 | End: 2023-10-07

## 2023-09-27 RX ORDER — INSULIN GLARGINE 100 [IU]/ML
INJECTION, SOLUTION SUBCUTANEOUS
Qty: 5 ADJUSTABLE DOSE PRE-FILLED PEN SYRINGE | Refills: 2 | Status: SHIPPED | OUTPATIENT
Start: 2023-09-27

## 2023-09-27 ASSESSMENT — PATIENT HEALTH QUESTIONNAIRE - PHQ9
9. THOUGHTS THAT YOU WOULD BE BETTER OFF DEAD, OR OF HURTING YOURSELF: 0
SUM OF ALL RESPONSES TO PHQ9 QUESTIONS 1 & 2: 0
7. TROUBLE CONCENTRATING ON THINGS, SUCH AS READING THE NEWSPAPER OR WATCHING TELEVISION: 0
SUM OF ALL RESPONSES TO PHQ QUESTIONS 1-9: 1
8. MOVING OR SPEAKING SO SLOWLY THAT OTHER PEOPLE COULD HAVE NOTICED. OR THE OPPOSITE, BEING SO FIGETY OR RESTLESS THAT YOU HAVE BEEN MOVING AROUND A LOT MORE THAN USUAL: 0
4. FEELING TIRED OR HAVING LITTLE ENERGY: 1
5. POOR APPETITE OR OVEREATING: 0
6. FEELING BAD ABOUT YOURSELF - OR THAT YOU ARE A FAILURE OR HAVE LET YOURSELF OR YOUR FAMILY DOWN: 0
SUM OF ALL RESPONSES TO PHQ QUESTIONS 1-9: 1
2. FEELING DOWN, DEPRESSED OR HOPELESS: 0
SUM OF ALL RESPONSES TO PHQ QUESTIONS 1-9: 1
10. IF YOU CHECKED OFF ANY PROBLEMS, HOW DIFFICULT HAVE THESE PROBLEMS MADE IT FOR YOU TO DO YOUR WORK, TAKE CARE OF THINGS AT HOME, OR GET ALONG WITH OTHER PEOPLE: 0
3. TROUBLE FALLING OR STAYING ASLEEP: 0
SUM OF ALL RESPONSES TO PHQ QUESTIONS 1-9: 1
1. LITTLE INTEREST OR PLEASURE IN DOING THINGS: 0

## 2023-09-27 NOTE — PROGRESS NOTES
Akua Reid  30 y.o. female  1985  [address] [address]  [id number]     [facility]       Chief Complaint:  Chief Complaint   Patient presents with    Skin Problem     On Abd Abcess   Source: self, the medical record     Subjective  Akua Reid is an 30 y.o. female who presents for     DM: A1c 12-->12.9 (checked last week) currently using. Basalgalr 30U qAM, using regularly. Fasting BG running 200s  Hemoglobin A1C   Date Value Ref Range Status   09/20/2023 12.9 (H) 4.0 - 5.6 % Final     Comment:     (NOTE)  HbA1C Interpretive Ranges  <5.7              Normal  5.7 - 6.4         Consider Prediabetes  >6.5              Consider Diabetes       Groin boil: arose after using topical nystatin for yeast, painful and tender. No fever, chills. Abdominal abscess: improved, no drainage, no fever, chills    ROS  Negative except what is mentioned in HPI      Allergies - reviewed: Allergies   Allergen Reactions    Penicillins      Other reaction(s): Unable to Obtain  \"Childhood allergy\"  Ancef 2g given pre-op with no reaction noted to test dose. Medications - reviewed:   Current Outpatient Medications   Medication Sig    Continuous Blood Gluc  (FREESTYLE LYNNETTE 2 READER) GLORIA 1 Units by Does not apply route every 14 days 1 Device by SubCUTAneous route daily. insulin glargine (BASAGLAR KWIKPEN) 100 UNIT/ML injection pen Inject 30U into the skin each morning and 10U each evening    doxycycline hyclate (VIBRA-TABS) 100 MG tablet Take 1 tablet by mouth 2 times daily for 10 days    BLOOD GLUCOSE MONITOR Fill with strips and lancets for bid testing. Uncontrolled type 2 DM E11.21    blood glucose test strips (ASCENSIA AUTODISC VI;ONE TOUCH ULTRA TEST VI) strip Any brand, for uncontrolled DM E11.21 check up to 3x daily    nystatin (MYCOSTATIN) 972022 UNIT/GM cream Apply topically 2 times daily.     clindamycin (CLEOCIN-T) 1 % lotion Apply topically 2 times

## 2023-09-27 NOTE — PATIENT INSTRUCTIONS
If morning sugars are running less than 100 and/or you are feeling shaky, dizzy, lightheaded PLEASE let me know ASAP so we can adjust your insulin.

## 2023-09-29 NOTE — TELEPHONE ENCOUNTER
MD Terri Lara,    [facility] stating rx needed for the [place] [employer] 2 \"sensors\". Rx sent for the [place]. Patient only needs 1 reader. Entered the [employer] 2 sensors. Thanks, Miriam Velásquez    Last appointment: 9/27/23 MD Terri Lara  Next appointment: None  Previous refill encounter(s): None- New rx for Sensors. Requested Prescriptions     Pending Prescriptions Disp Refills    Continuous Blood Gluc Sensor (FREESTYLE LYNNETTE 2 SENSOR) MISC 6 each 1     Sig: Use to monitor blood glucose daily as directed. Change every 14 days. DX: E11.21     For Pharmacy Admin Tracking Only    Program: Medication Refill  CPA in place:    Recommendation Provided To:    Intervention Detail: New Rx: 1, reason: Patient Preference  Intervention Accepted By:   Magalene Cockayne Closed?:    Time Spent (min): 5

## 2023-10-04 ENCOUNTER — TELEPHONE (OUTPATIENT)
Dept: PRIMARY CARE CLINIC | Facility: CLINIC | Age: 38
End: 2023-10-04

## 2023-10-04 NOTE — TELEPHONE ENCOUNTER
Pharmacy Progress Note     Contacted pt to discuss scheduling f/up with PharmD for uncontrolled diabetes. Pt states she feels fine when her blood sugars are elevated and feels sick when she takes insulin. She states that the insulin is making her ill. Discussed that when blood sugars are 300-400s and they come down there could be sx of hypoglycemia. Pt also states that she has had an infxn x3-4 months which she finally has antibiotics for. Has not yet completed abx therapy. She states when she has an infxn, her BG rdgs will be higher. Confirmed to pt that is true and offered to f/up with pt to evaluate if BG rdgs reduced after completing abx tx. Pt declined. Pt states that she doesn't want to keep sticking herself for the rest of her life. Proposed the use of CGM and samples. Pt states that she is ok with checking her BG but she doesn't want to inject insulin. States that she saw her father inject insulin 2-3x per day and he still  from diabetes. Discussed with pt that elevated rdgs would likely lead to poor outcomes. Pt hung up the phone. There are no discontinued medications. No orders of the defined types were placed in this encounter.         Kayode Lanza, PharmD, BCGP  Clinical Pharmacist Specialist      For Pharmacy Admin Tracking Only    Program: Medical Group  CPA in place:  Yes  Recommendation Provided To: Patient/Caregiver: 0 via Telephone  Intervention Accepted By: Patient/Caregiver: 0  Time Spent (min): 10

## 2023-10-16 DIAGNOSIS — E11.59 TYPE 2 DIABETES MELLITUS WITH OTHER CIRCULATORY COMPLICATIONS (HCC): ICD-10-CM

## 2023-10-16 NOTE — TELEPHONE ENCOUNTER
PCP: Matias Alvarez MD    Last appt: 9/27/2023     No future appointments.     Requested Prescriptions     Pending Prescriptions Disp Refills    lisinopril (PRINIVIL;ZESTRIL) 20 MG tablet [Pharmacy Med Name: LISINOPRIL 20 MG TABLET] 90 tablet      Sig: TAKE 1 TABLET BY MOUTH EVERY DAY       Prior labs and Blood pressures:  BP Readings from Last 3 Encounters:   09/27/23 129/84   09/20/23 118/78   08/01/23 137/86     Lab Results   Component Value Date/Time     09/20/2023 03:48 PM    K 4.3 09/20/2023 03:48 PM     09/20/2023 03:48 PM    CO2 28 09/20/2023 03:48 PM    BUN 9 09/20/2023 03:48 PM    GFRAA >60 08/23/2022 02:51 PM     Lab Results   Component Value Date/Time    IKH5JSAG 9.0 04/12/2022 11:26 AM     Lab Results   Component Value Date/Time    CHOL 181 02/03/2023 10:47 AM    HDL 45 02/03/2023 10:47 AM    VLDL 80 09/16/2020 12:09 PM     No results found for: \"VITD3\", \"VD3RIA\"    Lab Results   Component Value Date/Time    TSH 2.46 05/03/2023 10:14 AM

## 2023-10-17 RX ORDER — LISINOPRIL 20 MG/1
20 TABLET ORAL DAILY
Qty: 90 TABLET | Refills: 0 | Status: SHIPPED | OUTPATIENT
Start: 2023-10-17

## 2023-11-28 ENCOUNTER — OFFICE VISIT (OUTPATIENT)
Age: 38
End: 2023-11-28
Payer: COMMERCIAL

## 2023-11-28 VITALS
TEMPERATURE: 97.7 F | WEIGHT: 293 LBS | RESPIRATION RATE: 16 BRPM | BODY MASS INDEX: 53.92 KG/M2 | DIASTOLIC BLOOD PRESSURE: 88 MMHG | HEART RATE: 83 BPM | SYSTOLIC BLOOD PRESSURE: 130 MMHG | OXYGEN SATURATION: 98 % | HEIGHT: 62 IN

## 2023-11-28 DIAGNOSIS — E11.65 UNCONTROLLED TYPE 2 DIABETES MELLITUS WITH HYPERGLYCEMIA (HCC): Primary | ICD-10-CM

## 2023-11-28 DIAGNOSIS — F33.1 MODERATE EPISODE OF RECURRENT MAJOR DEPRESSIVE DISORDER (HCC): ICD-10-CM

## 2023-11-28 PROCEDURE — 99214 OFFICE O/P EST MOD 30 MIN: CPT | Performed by: STUDENT IN AN ORGANIZED HEALTH CARE EDUCATION/TRAINING PROGRAM

## 2023-11-28 PROCEDURE — 3079F DIAST BP 80-89 MM HG: CPT | Performed by: STUDENT IN AN ORGANIZED HEALTH CARE EDUCATION/TRAINING PROGRAM

## 2023-11-28 PROCEDURE — 3075F SYST BP GE 130 - 139MM HG: CPT | Performed by: STUDENT IN AN ORGANIZED HEALTH CARE EDUCATION/TRAINING PROGRAM

## 2023-11-28 PROCEDURE — 3046F HEMOGLOBIN A1C LEVEL >9.0%: CPT | Performed by: STUDENT IN AN ORGANIZED HEALTH CARE EDUCATION/TRAINING PROGRAM

## 2023-11-28 RX ORDER — PROCHLORPERAZINE 25 MG/1
1 SUPPOSITORY RECTAL
Qty: 1 EACH | Refills: 0 | Status: SHIPPED | OUTPATIENT
Start: 2023-11-28

## 2023-11-28 RX ORDER — INSULIN GLARGINE 100 [IU]/ML
INJECTION, SOLUTION SUBCUTANEOUS
Qty: 5 ADJUSTABLE DOSE PRE-FILLED PEN SYRINGE | Refills: 2 | Status: SHIPPED | OUTPATIENT
Start: 2023-11-28

## 2023-11-28 RX ORDER — SERTRALINE HYDROCHLORIDE 25 MG/1
25 TABLET, FILM COATED ORAL DAILY
Qty: 90 TABLET | Refills: 0 | Status: SHIPPED | OUTPATIENT
Start: 2023-11-28

## 2023-11-28 RX ORDER — PROCHLORPERAZINE 25 MG/1
1 SUPPOSITORY RECTAL
Qty: 10 EACH | Refills: 3 | Status: SHIPPED | OUTPATIENT
Start: 2023-11-28

## 2023-11-28 ASSESSMENT — PATIENT HEALTH QUESTIONNAIRE - PHQ9
6. FEELING BAD ABOUT YOURSELF - OR THAT YOU ARE A FAILURE OR HAVE LET YOURSELF OR YOUR FAMILY DOWN: 0
8. MOVING OR SPEAKING SO SLOWLY THAT OTHER PEOPLE COULD HAVE NOTICED. OR THE OPPOSITE, BEING SO FIGETY OR RESTLESS THAT YOU HAVE BEEN MOVING AROUND A LOT MORE THAN USUAL: 0
SUM OF ALL RESPONSES TO PHQ QUESTIONS 1-9: 3
2. FEELING DOWN, DEPRESSED OR HOPELESS: 3
SUM OF ALL RESPONSES TO PHQ9 QUESTIONS 1 & 2: 3
1. LITTLE INTEREST OR PLEASURE IN DOING THINGS: 0
7. TROUBLE CONCENTRATING ON THINGS, SUCH AS READING THE NEWSPAPER OR WATCHING TELEVISION: 0
10. IF YOU CHECKED OFF ANY PROBLEMS, HOW DIFFICULT HAVE THESE PROBLEMS MADE IT FOR YOU TO DO YOUR WORK, TAKE CARE OF THINGS AT HOME, OR GET ALONG WITH OTHER PEOPLE: 0
9. THOUGHTS THAT YOU WOULD BE BETTER OFF DEAD, OR OF HURTING YOURSELF: 0
4. FEELING TIRED OR HAVING LITTLE ENERGY: 0
SUM OF ALL RESPONSES TO PHQ QUESTIONS 1-9: 3
3. TROUBLE FALLING OR STAYING ASLEEP: 0
5. POOR APPETITE OR OVEREATING: 0

## 2023-11-28 ASSESSMENT — COLUMBIA-SUICIDE SEVERITY RATING SCALE - C-SSRS
3. HAVE YOU BEEN THINKING ABOUT HOW YOU MIGHT KILL YOURSELF?: NO
4. HAVE YOU HAD THESE THOUGHTS AND HAD SOME INTENTION OF ACTING ON THEM?: NO
7. DID THIS OCCUR IN THE LAST THREE MONTHS: NO
5. HAVE YOU STARTED TO WORK OUT OR WORKED OUT THE DETAILS OF HOW TO KILL YOURSELF? DO YOU INTEND TO CARRY OUT THIS PLAN?: NO

## 2023-11-28 NOTE — PATIENT INSTRUCTIONS
Record fasting blood sugars in your phone and bring to next visit unless you get the continuous meter than bring that with the  so we can review your blood sugars that the next visit

## 2023-11-28 NOTE — PROGRESS NOTES
Corey Romero  30 y.o. female  1985  [address] [address]  [id number]     [address] [facility]       Chief Complaint:  Chief Complaint   Patient presents with    Diabetes     1 mth f/u   Source: self, the medical record     Subjective  Corey Romero is an 30 y.o. female who presents for diabetes followup. A1c 12. 9. checking fasting BG each morning - this morning in the 200s and have been as low as 170s. She is currently taking metformin. She was taking basaglar 30U and it made her vomit. The last time she was using it was about 2 months ago. She has changed her eating hibits and is being more active. She held back over thanksgiving - focused on eating vegetables and small amount of fruits and turkey. She is no longer using trulicity nor CGM due to change in insurance and they will no longer cover it   Hemoglobin A1C   Date Value Ref Range Status   09/20/2023 12.9 (H) 4.0 - 5.6 % Final     Comment:     (NOTE)  HbA1C Interpretive Ranges  <5.7              Normal  5.7 - 6.4         Consider Prediabetes  >6.5              Consider Diabetes        Depression: has been feeling outbursts lately - can feel when she is going to have an outburts - has PTSD around the passing of her grandmother. Feels triggered by her loss and the holidays    ROS  Negative except what is mentioned in HPI      Allergies - reviewed: Allergies   Allergen Reactions    Penicillins      Other reaction(s): Unable to Obtain  \"Childhood allergy\"  Ancef 2g given pre-op with no reaction noted to test dose.          Medications - reviewed:   Current Outpatient Medications   Medication Sig    insulin glargine (BASAGLAR KWIKPEN) 100 UNIT/ML injection pen Inject 10U under the skin each night    Continuous Blood Gluc  (DEXCOM G6 ) GLORIA 1 Units by Does not apply route every 14 days Dispense 1 reader for continuous glucose monitoring    Continuous Blood Gluc Sensor (DEXCOM G6 SENSOR) MISC 1 Units

## 2024-01-19 ENCOUNTER — OFFICE VISIT (OUTPATIENT)
Age: 39
End: 2024-01-19
Payer: COMMERCIAL

## 2024-01-19 VITALS
HEIGHT: 62 IN | DIASTOLIC BLOOD PRESSURE: 88 MMHG | RESPIRATION RATE: 16 BRPM | OXYGEN SATURATION: 98 % | WEIGHT: 290 LBS | BODY MASS INDEX: 53.37 KG/M2 | SYSTOLIC BLOOD PRESSURE: 134 MMHG | HEART RATE: 81 BPM | TEMPERATURE: 97.3 F

## 2024-01-19 DIAGNOSIS — K21.9 GASTROESOPHAGEAL REFLUX DISEASE WITHOUT ESOPHAGITIS: ICD-10-CM

## 2024-01-19 DIAGNOSIS — E11.65 UNCONTROLLED TYPE 2 DIABETES MELLITUS WITH HYPERGLYCEMIA (HCC): Primary | ICD-10-CM

## 2024-01-19 LAB
ANION GAP SERPL CALC-SCNC: 9 MMOL/L (ref 5–15)
BUN SERPL-MCNC: 8 MG/DL (ref 6–20)
BUN/CREAT SERPL: 12 (ref 12–20)
CALCIUM SERPL-MCNC: 9.8 MG/DL (ref 8.5–10.1)
CHLORIDE SERPL-SCNC: 104 MMOL/L (ref 97–108)
CHOLEST SERPL-MCNC: 150 MG/DL
CO2 SERPL-SCNC: 25 MMOL/L (ref 21–32)
CREAT SERPL-MCNC: 0.65 MG/DL (ref 0.55–1.02)
CREAT UR-MCNC: 284 MG/DL
GLUCOSE SERPL-MCNC: 320 MG/DL (ref 65–100)
HBA1C MFR BLD: 12.3 %
HDLC SERPL-MCNC: 44 MG/DL
HDLC SERPL: 3.4 (ref 0–5)
LDLC SERPL CALC-MCNC: 46.2 MG/DL (ref 0–100)
MICROALBUMIN UR-MCNC: 5.69 MG/DL
MICROALBUMIN/CREAT UR-RTO: 20 MG/G (ref 0–30)
POTASSIUM SERPL-SCNC: 4.1 MMOL/L (ref 3.5–5.1)
SODIUM SERPL-SCNC: 138 MMOL/L (ref 136–145)
TRIGL SERPL-MCNC: 299 MG/DL
VLDLC SERPL CALC-MCNC: 59.8 MG/DL

## 2024-01-19 PROCEDURE — 3079F DIAST BP 80-89 MM HG: CPT | Performed by: STUDENT IN AN ORGANIZED HEALTH CARE EDUCATION/TRAINING PROGRAM

## 2024-01-19 PROCEDURE — 99215 OFFICE O/P EST HI 40 MIN: CPT | Performed by: STUDENT IN AN ORGANIZED HEALTH CARE EDUCATION/TRAINING PROGRAM

## 2024-01-19 PROCEDURE — 83036 HEMOGLOBIN GLYCOSYLATED A1C: CPT | Performed by: STUDENT IN AN ORGANIZED HEALTH CARE EDUCATION/TRAINING PROGRAM

## 2024-01-19 PROCEDURE — 3075F SYST BP GE 130 - 139MM HG: CPT | Performed by: STUDENT IN AN ORGANIZED HEALTH CARE EDUCATION/TRAINING PROGRAM

## 2024-01-19 RX ORDER — INSULIN GLARGINE 100 [IU]/ML
INJECTION, SOLUTION SUBCUTANEOUS
Qty: 5 ADJUSTABLE DOSE PRE-FILLED PEN SYRINGE | Refills: 2 | Status: SHIPPED | OUTPATIENT
Start: 2024-01-19 | End: 2024-01-19 | Stop reason: SDUPTHER

## 2024-01-19 RX ORDER — OMEPRAZOLE 20 MG/1
20 CAPSULE, DELAYED RELEASE ORAL
Qty: 90 CAPSULE | Refills: 0 | Status: SHIPPED | OUTPATIENT
Start: 2024-01-19

## 2024-01-19 RX ORDER — BLOOD-GLUCOSE METER
1 KIT MISCELLANEOUS DAILY
Qty: 1 KIT | Refills: 0 | Status: SHIPPED | OUTPATIENT
Start: 2024-01-19

## 2024-01-19 RX ORDER — INSULIN GLARGINE 100 [IU]/ML
INJECTION, SOLUTION SUBCUTANEOUS
Qty: 5 ADJUSTABLE DOSE PRE-FILLED PEN SYRINGE | Refills: 2 | Status: SHIPPED | OUTPATIENT
Start: 2024-01-19

## 2024-01-19 ASSESSMENT — PATIENT HEALTH QUESTIONNAIRE - PHQ9
SUM OF ALL RESPONSES TO PHQ QUESTIONS 1-9: 0
8. MOVING OR SPEAKING SO SLOWLY THAT OTHER PEOPLE COULD HAVE NOTICED. OR THE OPPOSITE, BEING SO FIGETY OR RESTLESS THAT YOU HAVE BEEN MOVING AROUND A LOT MORE THAN USUAL: 0
10. IF YOU CHECKED OFF ANY PROBLEMS, HOW DIFFICULT HAVE THESE PROBLEMS MADE IT FOR YOU TO DO YOUR WORK, TAKE CARE OF THINGS AT HOME, OR GET ALONG WITH OTHER PEOPLE: 0
2. FEELING DOWN, DEPRESSED OR HOPELESS: 0
9. THOUGHTS THAT YOU WOULD BE BETTER OFF DEAD, OR OF HURTING YOURSELF: 0
SUM OF ALL RESPONSES TO PHQ QUESTIONS 1-9: 0
SUM OF ALL RESPONSES TO PHQ9 QUESTIONS 1 & 2: 0
7. TROUBLE CONCENTRATING ON THINGS, SUCH AS READING THE NEWSPAPER OR WATCHING TELEVISION: 0
SUM OF ALL RESPONSES TO PHQ QUESTIONS 1-9: 0
3. TROUBLE FALLING OR STAYING ASLEEP: 0
5. POOR APPETITE OR OVEREATING: 0
SUM OF ALL RESPONSES TO PHQ QUESTIONS 1-9: 0
1. LITTLE INTEREST OR PLEASURE IN DOING THINGS: 0
4. FEELING TIRED OR HAVING LITTLE ENERGY: 0
6. FEELING BAD ABOUT YOURSELF - OR THAT YOU ARE A FAILURE OR HAVE LET YOURSELF OR YOUR FAMILY DOWN: 0

## 2024-01-19 NOTE — PROGRESS NOTES
Chief Complaint   Patient presents with    Diabetes     Follow up     \"Have you been to the ER, urgent care clinic since your last visit?  Hospitalized since your last visit?\"    YES - When: approximately 1  weeks ago.  [facility]    “Have you seen or consulted any other health care providers outside of [facility] since your last visit?”    NO        “Have you had a pap smear?”    NO                11/28/2023    11:15 AM   PHQ-9    Little interest or pleasure in doing things 0   Feeling down, depressed, or hopeless 3   Trouble falling or staying asleep, or sleeping too much 0   Feeling tired or having little energy 0   Poor appetite or overeating 0   Feeling bad about yourself - or that you are a failure or have let yourself or your family down 0   Trouble concentrating on things, such as reading the newspaper or watching television 0   Moving or speaking so slowly that other people could have noticed. Or the opposite - being so fidgety or restless that you have been moving around a lot more than usual 0   Thoughts that you would be better off dead, or of hurting yourself in some way 0   PHQ-2 Score 3   PHQ-9 Total Score 3   If you checked off any problems, how difficult have these problems made it for you to do your work, take care of things at home, or get along with other people? 0           Financial Resource Strain: Medium Risk (9/20/2023)    Overall Financial Resource Strain (CARDIA)     Difficulty of Paying Living Expenses: Somewhat hard      Food Insecurity: Not on file (9/20/2023)   Recent Concern: Food Insecurity - Food Insecurity Present (9/20/2023)    Hunger Vital Sign     Worried About Running Out of Food in the Last Year: Never true     Ran Out of Food in the Last Year: Sometimes true          Health Maintenance Due   Topic Date Due    Hepatitis B vaccine (1 of 3 - 3-dose series) Never done    Varicella vaccine (1 of 2 - 2-dose childhood series) Never done

## 2024-01-19 NOTE — PROGRESS NOTES
Leila Erazo  38 y.o. female  1985  [address] [address]  [id number]     [facility]       Chief Complaint:  Chief Complaint   Patient presents with    Diabetes     Follow up     Source: self, the medical record     Subjective  Leila Erazo is an 38 y.o. female who presents for DM followup. She was checking BGs with finger sticks - lost meter end of December - CGM not covered. She recalls fasting BG in 190s in that time. She is using lantus 10U QHS - tolerating it well.     A1c today 12.3%    ROS  Negative except what is mentioned in HPI      Allergies - reviewed:   Allergies   Allergen Reactions    Penicillins      Other reaction(s): Unable to Obtain  \"Childhood allergy\"  Ancef 2g given pre-op with no reaction noted to test dose.         Medications - reviewed:   Current Outpatient Medications   Medication Sig    insulin glargine (BASAGLAR KWIKPEN) 100 UNIT/ML injection pen Inject 1%U under the skin each night for one week, If tolerating, increase to inject 20U beneath the skin each night thereafter.    omeprazole (PRILOSEC) 20 MG delayed release capsule Take 1 capsule by mouth every morning (before breakfast)    glucose monitoring kit 1 kit by Does not apply route daily    sertraline (ZOLOFT) 25 MG tablet Take 1 tablet by mouth daily    lisinopril (PRINIVIL;ZESTRIL) 20 MG tablet TAKE 1 TABLET BY MOUTH EVERY DAY    Insulin Pen Needle (BD PEN NEEDLE SUKHI 2ND GEN) 32G X 4 MM MISC 1 each by Does not apply route daily    atorvastatin (LIPITOR) 40 MG tablet TAKE 1 TABLET BY MOUTH EVERY DAY    metFORMIN (GLUCOPHAGE) 500 MG tablet Take 2 tablets by mouth 2 times daily (with meals)    blood glucose test strips (ASCENSIA AUTODISC VI;ONE TOUCH ULTRA TEST VI) strip Any brand, for uncontrolled DM E11.21 check up to 3x daily (Patient not taking: Reported on 1/19/2024)    Lancets MISC Use with one touch test daily (Patient not taking: Reported on 1/19/2024)     No

## 2024-01-22 ENCOUNTER — TELEPHONE (OUTPATIENT)
Age: 39
End: 2024-01-22

## 2024-01-22 DIAGNOSIS — R74.8 ELEVATED ALKALINE PHOSPHATASE LEVEL: Primary | ICD-10-CM

## 2024-01-22 DIAGNOSIS — R93.2 ABNORMAL LIVER ULTRASOUND: ICD-10-CM

## 2024-01-22 NOTE — TELEPHONE ENCOUNTER
----- Message from Jie Gipson MD sent at 1/22/2024  9:04 AM EST -----  Please call to schedule for follwoup labs; orders placed 1/22    TGs 535 -->299, LDL at goal  CMP WNL expt glu/alk phos - reattempt MRI liver  Microalb neg

## 2024-01-24 ENCOUNTER — NURSE ONLY (OUTPATIENT)
Age: 39
End: 2024-01-24

## 2024-01-24 DIAGNOSIS — R74.8 ELEVATED ALKALINE PHOSPHATASE LEVEL: ICD-10-CM

## 2024-01-24 DIAGNOSIS — R93.2 ABNORMAL LIVER ULTRASOUND: ICD-10-CM

## 2024-01-25 LAB
-: NORMAL
GGT SERPL-CCNC: 53 U/L (ref 5–55)
HAV IGM SER QL: NONREACTIVE
HBV CORE IGM SER QL: NONREACTIVE
HBV SURFACE AG SER QL: <0.1 INDEX
HBV SURFACE AG SER QL: NEGATIVE
HCV AB SER IA-ACNC: 0.16 INDEX
HCV AB SERPL QL IA: NONREACTIVE

## 2024-02-05 ENCOUNTER — TELEPHONE (OUTPATIENT)
Age: 39
End: 2024-02-05

## 2024-02-05 NOTE — TELEPHONE ENCOUNTER
[facility] Radiology called stating the MRI order should say \"with in and without contrast\".

## 2024-02-06 DIAGNOSIS — R93.2 ABNORMAL LIVER ULTRASOUND: Primary | ICD-10-CM

## 2024-02-06 NOTE — TELEPHONE ENCOUNTER
Hello can u please send me a 30 day supply of metformin     PCP: Jie Gipson MD    Last appt: 1/19/2024     No future appointments.    Requested Prescriptions     Pending Prescriptions Disp Refills    metFORMIN (GLUCOPHAGE) 500 MG tablet 60 tablet 0     Sig: Take 2 tablets by mouth 2 times daily (with meals)       Prior labs and Blood pressures:  BP Readings from Last 3 Encounters:   01/19/24 134/88   11/28/23 130/88   09/27/23 129/84     Lab Results   Component Value Date/Time     01/19/2024 10:48 AM    K 4.1 01/19/2024 10:48 AM     01/19/2024 10:48 AM    CO2 25 01/19/2024 10:48 AM    BUN 8 01/19/2024 10:48 AM    GFRAA >60 08/23/2022 02:51 PM     Lab Results   Component Value Date/Time    [id number] 12.3 01/19/2024 10:35 AM     Lab Results   Component Value Date/Time    CHOL 150 01/19/2024 10:48 AM    HDL 44 01/19/2024 10:48 AM    VLDL 80 09/16/2020 12:09 PM     No results found for: \"VITD3\", \"VD3RIA\"    Lab Results   Component Value Date/Time    TSH 2.46 05/03/2023 10:14 AM

## 2024-02-06 NOTE — TELEPHONE ENCOUNTER
----- Message from Leila Erazo sent at 2/5/2024 12:29 PM EST -----  Regarding: Metformin   Contact: [phone]  Hello can u please send me a 30 day supply of metformin

## 2024-02-06 NOTE — TELEPHONE ENCOUNTER
Spoke to pt on 2/6/24 informed her that  approved her Metformin,and that we would need to get her re-established with a new PCP.Pt's been re-scheduled to see  on 3/5/24 @ 9:40 AM.

## 2024-03-05 ENCOUNTER — OFFICE VISIT (OUTPATIENT)
Age: 39
End: 2024-03-05
Payer: COMMERCIAL

## 2024-03-05 VITALS
SYSTOLIC BLOOD PRESSURE: 123 MMHG | HEIGHT: 62 IN | HEART RATE: 90 BPM | WEIGHT: 287.2 LBS | RESPIRATION RATE: 14 BRPM | DIASTOLIC BLOOD PRESSURE: 83 MMHG | TEMPERATURE: 97.7 F | OXYGEN SATURATION: 100 % | BODY MASS INDEX: 52.85 KG/M2

## 2024-03-05 DIAGNOSIS — R93.2 ABNORMAL LIVER ULTRASOUND: ICD-10-CM

## 2024-03-05 DIAGNOSIS — H66.92 LEFT OTITIS MEDIA, UNSPECIFIED OTITIS MEDIA TYPE: ICD-10-CM

## 2024-03-05 DIAGNOSIS — F33.1 MODERATE EPISODE OF RECURRENT MAJOR DEPRESSIVE DISORDER (HCC): ICD-10-CM

## 2024-03-05 DIAGNOSIS — K21.9 GASTROESOPHAGEAL REFLUX DISEASE WITHOUT ESOPHAGITIS: ICD-10-CM

## 2024-03-05 DIAGNOSIS — E11.59 TYPE 2 DIABETES MELLITUS WITH OTHER CIRCULATORY COMPLICATIONS (HCC): Primary | ICD-10-CM

## 2024-03-05 DIAGNOSIS — I15.2 HYPERTENSION SECONDARY TO ENDOCRINE DISORDERS: ICD-10-CM

## 2024-03-05 DIAGNOSIS — F41.9 ANXIETY: ICD-10-CM

## 2024-03-05 DIAGNOSIS — R74.8 ELEVATED ALKALINE PHOSPHATASE LEVEL: ICD-10-CM

## 2024-03-05 PROCEDURE — 99214 OFFICE O/P EST MOD 30 MIN: CPT | Performed by: STUDENT IN AN ORGANIZED HEALTH CARE EDUCATION/TRAINING PROGRAM

## 2024-03-05 PROCEDURE — 3079F DIAST BP 80-89 MM HG: CPT | Performed by: STUDENT IN AN ORGANIZED HEALTH CARE EDUCATION/TRAINING PROGRAM

## 2024-03-05 PROCEDURE — 3074F SYST BP LT 130 MM HG: CPT | Performed by: STUDENT IN AN ORGANIZED HEALTH CARE EDUCATION/TRAINING PROGRAM

## 2024-03-05 RX ORDER — PANTOPRAZOLE SODIUM 40 MG/1
40 TABLET, DELAYED RELEASE ORAL
Qty: 90 TABLET | Refills: 1 | Status: SHIPPED | OUTPATIENT
Start: 2024-03-05

## 2024-03-05 RX ORDER — OFLOXACIN 3 MG/ML
5 SOLUTION AURICULAR (OTIC) 2 TIMES DAILY
Qty: 5 ML | Refills: 0 | Status: SHIPPED | OUTPATIENT
Start: 2024-03-05 | End: 2024-03-15

## 2024-03-05 RX ORDER — LISINOPRIL 20 MG/1
20 TABLET ORAL DAILY
Qty: 90 TABLET | Refills: 0 | Status: SHIPPED | OUTPATIENT
Start: 2024-03-05

## 2024-03-05 NOTE — PROGRESS NOTES
Chief Complaint   Patient presents with    Diabetes    Establish Care     Previous PCP: Dr. Jie Gipson     \"Have you been to the ER, urgent care clinic since your last visit?  Hospitalized since your last visit?\"    [facility] - estimated 2 weeks ago - \"Acid reflux that turned into an axiety attack\"    “Have you seen or consulted any other health care providers outside of [facility] since your last visit?”    [facility]- Cardiology - Doctor unknown         “Have you had a pap smear?”    NO                1/19/2024    10:12 AM   PHQ-9    Little interest or pleasure in doing things 0   Feeling down, depressed, or hopeless 0   Trouble falling or staying asleep, or sleeping too much 0   Feeling tired or having little energy 0   Poor appetite or overeating 0   Feeling bad about yourself - or that you are a failure or have let yourself or your family down 0   Trouble concentrating on things, such as reading the newspaper or watching television 0   Moving or speaking so slowly that other people could have noticed. Or the opposite - being so fidgety or restless that you have been moving around a lot more than usual 0   Thoughts that you would be better off dead, or of hurting yourself in some way 0   PHQ-2 Score 0   PHQ-9 Total Score 0   If you checked off any problems, how difficult have these problems made it for you to do your work, take care of things at home, or get along with other people? 0           Financial Resource Strain: Medium Risk (9/20/2023)    Overall Financial Resource Strain (CARDIA)     Difficulty of Paying Living Expenses: Somewhat hard      Food Insecurity: Not on file (9/20/2023)   Recent Concern: Food Insecurity - Food Insecurity Present (9/20/2023)    Hunger Vital Sign     Worried About Running Out of Food in the Last Year: Never true     Ran Out of Food in the Last Year: Sometimes true          Health Maintenance Due   Topic Date Due    Hepatitis B vaccine (1 of 3 - 3-dose series)

## 2024-03-05 NOTE — PROGRESS NOTES
[facility]      Chief Complaint:     Chief Complaint   Patient presents with    Diabetes    Establish Care     Previous PCP: Dr. Jie Gipson       Leila Erazo is a 38 y.o. female that presents for: establish care      Assessment/Plan:     Leila was seen today for diabetes and establish care.    Diagnoses and all orders for this visit:    Type 2 diabetes mellitus with other circulatory complications (HCC)  -     lisinopril (PRINIVIL;ZESTRIL) 20 MG tablet; Take 1 tablet by mouth daily  -     Comprehensive Metabolic Panel; Future  -     Hemoglobin A1C; Future    Abnormal liver ultrasound  -     Rios Triana MD, [facility] (Jessica Olmedo)    Elevated alkaline phosphatase level  -     Rios Triana MD, [facility] (Jessica Olmedo)    Gastroesophageal reflux disease without esophagitis  -     Rios Triana MD, [facility] (Jessica Olmedo)  -     pantoprazole (PROTONIX) 40 MG tablet; Take 1 tablet by mouth every morning (before breakfast)    Moderate episode of recurrent major depressive disorder (HCC)    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     Subjective:   HPI:  Leila Erazo is a 38 y.o. female that presents for: establish care    Previous PCP: Leonela    CC:  Glucose 217-200  Thumping in left ear, had oral abx     MedHx  T2DM: Dx in 2014. Lantus 20u>22 units, Metformin max, last A1C 12.3 on 1/19/24. Previously on glipizide, trulicity (insurance stopped paying for it). VEI last eye exam Tyree 15-working on getting eye insurance card. Has appointment with Endo in 2 months.   HTN: Lisinopril 20mg  HLD: Lipitor 40mg, LDL at goal  GERD: Prilosec, not controlled -- refer to GI and add Protonix  Liver mass, elevated alk phos, SALGADO: 2022 US- Focal fatty sparing versus liver mass measuring 7.5 x 6.1 x 2.3 cm.   Liver mass

## 2024-03-19 NOTE — TELEPHONE ENCOUNTER
PCP: Danyell Sarkar DO    Last appt: 3/5/2024     Future Appointments   Date Time Provider Department Center   4/16/2024  9:20 AM Danyell Sarkar DO [facility] BS AMB   5/7/2024 11:10 AM Gabbie Solis MD RDE [facility] BS AMB       Requested Prescriptions     Pending Prescriptions Disp Refills    metFORMIN (GLUCOPHAGE) 500 MG tablet 60 tablet 0     Sig: Take 2 tablets by mouth 2 times daily (with meals)       Prior labs and Blood pressures:  BP Readings from Last 3 Encounters:   03/05/24 123/83   01/19/24 134/88   11/28/23 130/88     Lab Results   Component Value Date/Time     01/19/2024 10:48 AM    K 4.1 01/19/2024 10:48 AM     01/19/2024 10:48 AM    CO2 25 01/19/2024 10:48 AM    BUN 8 01/19/2024 10:48 AM    GFRAA >60 08/23/2022 02:51 PM     Lab Results   Component Value Date/Time    [id number] 12.3 01/19/2024 10:35 AM     Lab Results   Component Value Date/Time    CHOL 150 01/19/2024 10:48 AM    HDL 44 01/19/2024 10:48 AM    VLDL 80 09/16/2020 12:09 PM     No results found for: \"VITD3\", \"VD3RIA\"    Lab Results   Component Value Date/Time    TSH 2.46 05/03/2023 10:14 AM

## 2024-03-21 ENCOUNTER — TELEPHONE (OUTPATIENT)
Age: 39
End: 2024-03-21

## 2024-03-21 NOTE — TELEPHONE ENCOUNTER
MD Sarkar,    [facility] stating patient requires PA for pantoprazole for more than 3 month supply per year.  (Only covers 90/365 days).    Please call for PA.  No other info provided. Sonja Estee    Rx: 3/5/24 90 + 1 refill          For Pharmacy Admin Tracking Only    Program: Medication Refill  CPA in place:    Recommendation Provided To:   Intervention Detail: New Rx: 1, reason: Patient Preference  Intervention Accepted By:   Gap Closed?:    Time Spent (min): 5

## 2024-03-22 NOTE — TELEPHONE ENCOUNTER
Attempted prior authorization through CoverMyMeds. No prior authorization required.     Martha Hall, CMA

## 2024-04-25 RX ORDER — ATORVASTATIN CALCIUM 40 MG/1
TABLET, FILM COATED ORAL
Qty: 90 TABLET | Refills: 1 | Status: SHIPPED | OUTPATIENT
Start: 2024-04-25

## 2024-04-25 NOTE — TELEPHONE ENCOUNTER
PCP: Danyell Sarkar,     Last appt: 3/5/2024       Future Appointments   Date Time Provider Department Center   5/7/2024 11:10 AM Gabbie Solis MD E [facility] BS AMB       Requested Prescriptions     Pending Prescriptions Disp Refills    atorvastatin (LIPITOR) 40 MG tablet [Pharmacy Med Name: ATORVASTATIN 40 MG TABLET] 90 tablet      Sig: TAKE 1 TABLET BY MOUTH EVERY DAY       Prior labs and Blood pressures:  BP Readings from Last 3 Encounters:   03/05/24 123/83   01/19/24 134/88   11/28/23 130/88     Lab Results   Component Value Date/Time     01/19/2024 10:48 AM    K 4.1 01/19/2024 10:48 AM     01/19/2024 10:48 AM    CO2 25 01/19/2024 10:48 AM    BUN 8 01/19/2024 10:48 AM    GFRAA >60 08/23/2022 02:51 PM     Lab Results   Component Value Date/Time    [id number] 12.3 01/19/2024 10:35 AM     Lab Results   Component Value Date/Time    CHOL 150 01/19/2024 10:48 AM    HDL 44 01/19/2024 10:48 AM    VLDL 80 09/16/2020 12:09 PM     No results found for: \"VITD3\", \"VD3RIA\"    Lab Results   Component Value Date/Time    TSH 2.46 05/03/2023 10:14 AM

## 2024-06-14 DIAGNOSIS — E11.65 UNCONTROLLED TYPE 2 DIABETES MELLITUS WITH HYPERGLYCEMIA (HCC): Primary | ICD-10-CM

## 2024-06-14 NOTE — TELEPHONE ENCOUNTER
PCP: Danyell Sarkar DO      Future Appointments   Date Time Provider Department Center   6/18/2024 10:20 AM Danyell Sarkar DO [facility] BS AMB     Last seen:3/5/2024    Requested Prescriptions     Pending Prescriptions Disp Refills    metFORMIN (GLUCOPHAGE) 500 MG tablet [Pharmacy Med Name: METFORMIN  MG TABLET] 360 tablet 3     Sig: TAKE 2 TABLETS BY MOUTH TWICE A DAY WITH MEALS

## 2024-08-22 ENCOUNTER — TELEPHONE (OUTPATIENT)
Age: 39
End: 2024-08-22

## 2024-08-22 NOTE — TELEPHONE ENCOUNTER
----- Message from Karen THOMPSON sent at 8/22/2024  1:17 PM EDT -----  Regarding: [facility] Appointment Request  [facility] Appointment Request    Patient needs appointment for [facility] Appointment Type: New Patient.    Patient Requested Dates(s): next week or as soon as possible  Patient Requested Time: anytime  Provider Name: Any Provider that have earlier availability    Reason for Appointment Request: New Patient - Available appointments did not meet patient need  --------------------------------------------------------------------------------------------------------------------------    Relationship to Patient: Self     Call Back Information: OK to respond with electronic message via [email or web address] portal (only for patients who have registered [email or web address] account)    Preferred Call Back Number: Phone [phone]

## 2024-09-13 DIAGNOSIS — K21.9 GASTROESOPHAGEAL REFLUX DISEASE WITHOUT ESOPHAGITIS: ICD-10-CM

## 2024-09-13 DIAGNOSIS — E11.59 TYPE 2 DIABETES MELLITUS WITH OTHER CIRCULATORY COMPLICATIONS (HCC): ICD-10-CM

## 2024-09-16 RX ORDER — ATORVASTATIN CALCIUM 40 MG/1
40 TABLET, FILM COATED ORAL DAILY
Qty: 30 TABLET | Refills: 0 | Status: SHIPPED | OUTPATIENT
Start: 2024-09-16

## 2024-09-16 RX ORDER — LISINOPRIL 20 MG/1
20 TABLET ORAL DAILY
Qty: 30 TABLET | Refills: 0 | Status: SHIPPED | OUTPATIENT
Start: 2024-09-16

## 2024-09-30 DIAGNOSIS — K21.9 GASTROESOPHAGEAL REFLUX DISEASE WITHOUT ESOPHAGITIS: ICD-10-CM

## 2024-10-03 ENCOUNTER — OFFICE VISIT (OUTPATIENT)
Age: 39
End: 2024-10-03
Payer: COMMERCIAL

## 2024-10-03 VITALS
HEIGHT: 62 IN | BODY MASS INDEX: 53.92 KG/M2 | OXYGEN SATURATION: 96 % | HEART RATE: 78 BPM | RESPIRATION RATE: 16 BRPM | SYSTOLIC BLOOD PRESSURE: 114 MMHG | DIASTOLIC BLOOD PRESSURE: 78 MMHG | WEIGHT: 293 LBS | TEMPERATURE: 97.5 F

## 2024-10-03 DIAGNOSIS — K75.81 NASH (NONALCOHOLIC STEATOHEPATITIS): ICD-10-CM

## 2024-10-03 DIAGNOSIS — I10 PRIMARY HYPERTENSION: ICD-10-CM

## 2024-10-03 DIAGNOSIS — E11.65 UNCONTROLLED TYPE 2 DIABETES MELLITUS WITH HYPERGLYCEMIA (HCC): Primary | ICD-10-CM

## 2024-10-03 DIAGNOSIS — K21.9 GASTROESOPHAGEAL REFLUX DISEASE WITHOUT ESOPHAGITIS: ICD-10-CM

## 2024-10-03 DIAGNOSIS — E78.00 HYPERCHOLESTEROLEMIA: ICD-10-CM

## 2024-10-03 DIAGNOSIS — F43.23 ADJUSTMENT DISORDER WITH MIXED ANXIETY AND DEPRESSED MOOD: ICD-10-CM

## 2024-10-03 DIAGNOSIS — N76.0 ACUTE VAGINITIS: ICD-10-CM

## 2024-10-03 PROBLEM — F32.5 MAJOR DEPRESSIVE DISORDER IN FULL REMISSION (HCC): Status: RESOLVED | Noted: 2022-07-14 | Resolved: 2024-10-03

## 2024-10-03 PROCEDURE — 3078F DIAST BP <80 MM HG: CPT | Performed by: NURSE PRACTITIONER

## 2024-10-03 PROCEDURE — 99214 OFFICE O/P EST MOD 30 MIN: CPT | Performed by: NURSE PRACTITIONER

## 2024-10-03 PROCEDURE — 3074F SYST BP LT 130 MM HG: CPT | Performed by: NURSE PRACTITIONER

## 2024-10-03 RX ORDER — LISINOPRIL 20 MG/1
20 TABLET ORAL DAILY
Qty: 90 TABLET | Refills: 1 | Status: SHIPPED | OUTPATIENT
Start: 2024-10-03

## 2024-10-03 RX ORDER — ATORVASTATIN CALCIUM 40 MG/1
40 TABLET, FILM COATED ORAL DAILY
Qty: 90 TABLET | Refills: 1 | Status: SHIPPED | OUTPATIENT
Start: 2024-10-03

## 2024-10-03 RX ORDER — INSULIN GLARGINE 100 [IU]/ML
INJECTION, SOLUTION SUBCUTANEOUS
Qty: 5 ADJUSTABLE DOSE PRE-FILLED PEN SYRINGE | Refills: 2 | Status: SHIPPED | OUTPATIENT
Start: 2024-10-03 | End: 2024-10-06 | Stop reason: SDUPTHER

## 2024-10-03 RX ORDER — FLUCONAZOLE 150 MG/1
150 TABLET ORAL ONCE
Qty: 1 TABLET | Refills: 0 | Status: SHIPPED | OUTPATIENT
Start: 2024-10-03 | End: 2024-10-03

## 2024-10-03 SDOH — ECONOMIC STABILITY: FOOD INSECURITY: WITHIN THE PAST 12 MONTHS, THE FOOD YOU BOUGHT JUST DIDN'T LAST AND YOU DIDN'T HAVE MONEY TO GET MORE.: NEVER TRUE

## 2024-10-03 SDOH — ECONOMIC STABILITY: INCOME INSECURITY: HOW HARD IS IT FOR YOU TO PAY FOR THE VERY BASICS LIKE FOOD, HOUSING, MEDICAL CARE, AND HEATING?: NOT HARD AT ALL

## 2024-10-03 SDOH — ECONOMIC STABILITY: FOOD INSECURITY: WITHIN THE PAST 12 MONTHS, YOU WORRIED THAT YOUR FOOD WOULD RUN OUT BEFORE YOU GOT MONEY TO BUY MORE.: NEVER TRUE

## 2024-10-03 ASSESSMENT — PATIENT HEALTH QUESTIONNAIRE - PHQ9
3. TROUBLE FALLING OR STAYING ASLEEP: NOT AT ALL
5. POOR APPETITE OR OVEREATING: NOT AT ALL
4. FEELING TIRED OR HAVING LITTLE ENERGY: MORE THAN HALF THE DAYS
SUM OF ALL RESPONSES TO PHQ QUESTIONS 1-9: 4
SUM OF ALL RESPONSES TO PHQ9 QUESTIONS 1 & 2: 2
7. TROUBLE CONCENTRATING ON THINGS, SUCH AS READING THE NEWSPAPER OR WATCHING TELEVISION: NOT AT ALL
9. THOUGHTS THAT YOU WOULD BE BETTER OFF DEAD, OR OF HURTING YOURSELF: NOT AT ALL
SUM OF ALL RESPONSES TO PHQ QUESTIONS 1-9: 4
1. LITTLE INTEREST OR PLEASURE IN DOING THINGS: SEVERAL DAYS
6. FEELING BAD ABOUT YOURSELF - OR THAT YOU ARE A FAILURE OR HAVE LET YOURSELF OR YOUR FAMILY DOWN: NOT AT ALL
SUM OF ALL RESPONSES TO PHQ QUESTIONS 1-9: 4
8. MOVING OR SPEAKING SO SLOWLY THAT OTHER PEOPLE COULD HAVE NOTICED. OR THE OPPOSITE, BEING SO FIGETY OR RESTLESS THAT YOU HAVE BEEN MOVING AROUND A LOT MORE THAN USUAL: NOT AT ALL
10. IF YOU CHECKED OFF ANY PROBLEMS, HOW DIFFICULT HAVE THESE PROBLEMS MADE IT FOR YOU TO DO YOUR WORK, TAKE CARE OF THINGS AT HOME, OR GET ALONG WITH OTHER PEOPLE: NOT DIFFICULT AT ALL
SUM OF ALL RESPONSES TO PHQ QUESTIONS 1-9: 4
2. FEELING DOWN, DEPRESSED OR HOPELESS: SEVERAL DAYS

## 2024-10-03 ASSESSMENT — ENCOUNTER SYMPTOMS
SHORTNESS OF BREATH: 0
CHEST TIGHTNESS: 0

## 2024-10-03 NOTE — PROGRESS NOTES
Subjective:      Patient ID: Leila Erazo is a 38 y.o. female     HPI  New to provider, former patient of Dr. Sarkar.    Follow up health problems.   T2DM.  Uncontrolled.  Last A1C at 12.3.  Reports she had not been taking her insulin.  Now back on med.  Taking metformin.  Was on trulicity in past but states insurance no longer covered med.  Hypercholesterolemia on atorvastatin.  Reports she follows a healthy diet.  Active at work as a caregiver.   HTN.  Taking prescribed meds.  History of SALGADO and GERD.  States she is followed by GI.  Has upcoming appointment.  No longer taking PPI.    Depression/anxiety.  Taking zoloft with good symptom control.    Due for pap and mammogram.  States she has upcoming appointment with gyn in 2 weeks.  Having some vaginal itching over past several days.  No discharge.      Patient Active Problem List   Diagnosis    Primary osteoarthritis of right knee    Uncontrolled type 2 diabetes mellitus with hyperglycemia (HCC)    Obesity, morbid    Gallstones    SALGADO (nonalcoholic steatohepatitis)    Vitamin D deficiency    Gastroesophageal reflux disease without esophagitis    Food insecurity    Adjustment disorder with mixed anxiety and depressed mood     Current Outpatient Medications   Medication Sig    glucose monitoring kit 1 kit by Does not apply route daily    Insulin Pen Needle (BD PEN NEEDLE SUKHI 2ND GEN) 32G X 4 MM MISC 1 each by Does not apply route daily    Lancets MISC     atorvastatin (LIPITOR) 40 MG tablet Take 1 tablet by mouth daily    insulin glargine (BASAGLAR KWIKPEN) 100 UNIT/ML injection pen Inject 20 units under the skin daily.    lisinopril (PRINIVIL;ZESTRIL) 20 MG tablet Take 1 tablet by mouth daily    metFORMIN (GLUCOPHAGE) 500 MG tablet Take 2 tablets by mouth 2 times daily (with meals)    sertraline (ZOLOFT) 50 MG tablet Take 1 tablet by mouth daily    fluconazole (DIFLUCAN) 150 MG tablet Take 1 tablet by mouth once for 1 dose     No current

## 2024-10-03 NOTE — PROGRESS NOTES
Chief Complaint   Patient presents with    Diabetes     Follow up    Hypertension     Follow up    Establish Care     \"Have you been to the ER, urgent care clinic since your last visit?  Hospitalized since your last visit?\"    NO    “Have you seen or consulted any other health care providers outside of [facility] since your last visit?”    NO     “Have you had a pap smear?”    NO    Date of last Cervical Cancer screen (HPV or PAP): 1/9/2019             Click Here for Release of Records Request             10/3/2024    10:57 AM   PHQ-9    Little interest or pleasure in doing things 0   Feeling down, depressed, or hopeless 0   Trouble falling or staying asleep, or sleeping too much 0   Feeling tired or having little energy 0   Poor appetite or overeating 0   Feeling bad about yourself - or that you are a failure or have let yourself or your family down 0   Trouble concentrating on things, such as reading the newspaper or watching television 0   Moving or speaking so slowly that other people could have noticed. Or the opposite - being so fidgety or restless that you have been moving around a lot more than usual 0   Thoughts that you would be better off dead, or of hurting yourself in some way 0   PHQ-2 Score 0   PHQ-9 Total Score 0   If you checked off any problems, how difficult have these problems made it for you to do your work, take care of things at home, or get along with other people? 0           Financial Resource Strain: Low Risk  (10/3/2024)    Overall Financial Resource Strain (CARDIA)     Difficulty of Paying Living Expenses: Not hard at all      Food Insecurity: No Food Insecurity (10/3/2024)    Hunger Vital Sign     Worried About Running Out of Food in the Last Year: Never true     Ran Out of Food in the Last Year: Never true          Health Maintenance Due   Topic Date Due    Varicella vaccine (1 of 2 - 13+ 2-dose series) Never done    Hepatitis B vaccine (1 of 3 - 19+ 3-dose series) Never

## 2024-10-04 LAB
ALBUMIN SERPL-MCNC: 3.5 G/DL (ref 3.5–5)
ALBUMIN/GLOB SERPL: 1 (ref 1.1–2.2)
ALP SERPL-CCNC: 139 U/L (ref 45–117)
ALT SERPL-CCNC: 18 U/L (ref 12–78)
ANION GAP SERPL CALC-SCNC: 6 MMOL/L (ref 2–12)
AST SERPL-CCNC: 10 U/L (ref 15–37)
BILIRUB SERPL-MCNC: 0.3 MG/DL (ref 0.2–1)
BUN SERPL-MCNC: 9 MG/DL (ref 6–20)
BUN/CREAT SERPL: 14 (ref 12–20)
CALCIUM SERPL-MCNC: 9.6 MG/DL (ref 8.5–10.1)
CHLORIDE SERPL-SCNC: 100 MMOL/L (ref 97–108)
CHOLEST SERPL-MCNC: 244 MG/DL
CO2 SERPL-SCNC: 27 MMOL/L (ref 21–32)
CREAT SERPL-MCNC: 0.65 MG/DL (ref 0.55–1.02)
EST. AVERAGE GLUCOSE BLD GHB EST-MCNC: 341 MG/DL
GLOBULIN SER CALC-MCNC: 3.6 G/DL (ref 2–4)
GLUCOSE SERPL-MCNC: 328 MG/DL (ref 65–100)
HBA1C MFR BLD: 13.5 % (ref 4–5.6)
HDLC SERPL-MCNC: 42 MG/DL
HDLC SERPL: 5.8 (ref 0–5)
LDLC SERPL CALC-MCNC: ABNORMAL MG/DL (ref 0–100)
LDLC SERPL DIRECT ASSAY-MCNC: 117 MG/DL (ref 0–100)
POTASSIUM SERPL-SCNC: 4.1 MMOL/L (ref 3.5–5.1)
PROT SERPL-MCNC: 7.1 G/DL (ref 6.4–8.2)
SODIUM SERPL-SCNC: 133 MMOL/L (ref 136–145)
TRIGL SERPL-MCNC: 522 MG/DL
VLDLC SERPL CALC-MCNC: ABNORMAL MG/DL

## 2024-10-06 DIAGNOSIS — E78.2 MIXED HYPERLIPIDEMIA: ICD-10-CM

## 2024-10-06 DIAGNOSIS — E11.65 UNCONTROLLED TYPE 2 DIABETES MELLITUS WITH HYPERGLYCEMIA (HCC): Primary | ICD-10-CM

## 2024-10-06 RX ORDER — INSULIN GLARGINE 100 [IU]/ML
INJECTION, SOLUTION SUBCUTANEOUS
Qty: 5 ADJUSTABLE DOSE PRE-FILLED PEN SYRINGE | Refills: 2
Start: 2024-10-06

## 2024-10-06 RX ORDER — FENOFIBRATE 160 MG/1
160 TABLET ORAL DAILY
Qty: 30 TABLET | Refills: 1 | Status: SHIPPED | OUTPATIENT
Start: 2024-10-06

## 2024-10-06 RX ORDER — GLIPIZIDE 5 MG/1
5 TABLET ORAL
Qty: 60 TABLET | Refills: 1 | Status: SHIPPED | OUTPATIENT
Start: 2024-10-06

## 2024-10-20 DIAGNOSIS — E11.65 UNCONTROLLED TYPE 2 DIABETES MELLITUS WITH HYPERGLYCEMIA (HCC): ICD-10-CM

## 2024-10-20 DIAGNOSIS — E78.2 MIXED HYPERLIPIDEMIA: ICD-10-CM

## 2024-10-21 RX ORDER — FENOFIBRATE 160 MG/1
160 TABLET ORAL DAILY
Qty: 90 TABLET | Refills: 1 | Status: SHIPPED | OUTPATIENT
Start: 2024-10-21

## 2024-10-21 RX ORDER — GLIPIZIDE 5 MG/1
10 TABLET ORAL
Qty: 180 TABLET | Refills: 1 | Status: SHIPPED | OUTPATIENT
Start: 2024-10-21

## 2024-10-21 NOTE — TELEPHONE ENCOUNTER
PCP: No primary care provider on file.    Last appt: 10/3/2024   No future appointments.    Requested Prescriptions     Pending Prescriptions Disp Refills    fenofibrate 160 MG tablet [Pharmacy Med Name: FENOFIBRATE 160 MG TABLET] 90 tablet 1     Sig: TAKE 1 TABLET BY MOUTH EVERY DAY    glipiZIDE (GLUCOTROL) 5 MG tablet [Pharmacy Med Name: GLIPIZIDE 5 MG TABLET] 180 tablet 1     Sig: TAKE 1 TABLET BY MOUTH TWICE A DAY BEFORE MEALS         Prior labs and Blood pressures:  BP Readings from Last 3 Encounters:   10/03/24 114/78   03/05/24 123/83   01/19/24 134/88     Lab Results   Component Value Date/Time     10/03/2024 12:00 PM    K 4.1 10/03/2024 12:00 PM     10/03/2024 12:00 PM    CO2 27 10/03/2024 12:00 PM    BUN 9 10/03/2024 12:00 PM    GFRAA >60 08/23/2022 02:51 PM     Lab Results   Component Value Date/Time    [id number] 12.3 01/19/2024 10:35 AM     Lab Results   Component Value Date/Time    CHOL 244 10/03/2024 12:00 PM    HDL 42 10/03/2024 12:00 PM    LDL Not calculated due to elevated triglyceride level 10/03/2024 12:00 PM    LDL 46.2 01/19/2024 10:48 AM    VLDL  10/03/2024 12:00 PM     Calculation not valid with this patient's other Lipid values.    VLDL 80 09/16/2020 12:09 PM     No results found for: \"VITD3\"    Lab Results   Component Value Date/Time    TSH 2.46 05/03/2023 10:14 AM

## 2025-01-20 ENCOUNTER — OFFICE VISIT (OUTPATIENT)
Age: 40
End: 2025-01-20
Payer: MEDICAID

## 2025-01-20 VITALS
WEIGHT: 293 LBS | HEIGHT: 62 IN | TEMPERATURE: 98.5 F | RESPIRATION RATE: 16 BRPM | OXYGEN SATURATION: 96 % | BODY MASS INDEX: 53.92 KG/M2 | DIASTOLIC BLOOD PRESSURE: 79 MMHG | SYSTOLIC BLOOD PRESSURE: 124 MMHG | HEART RATE: 90 BPM

## 2025-01-20 DIAGNOSIS — R11.0 NAUSEA: ICD-10-CM

## 2025-01-20 DIAGNOSIS — Z91.199 NON-COMPLIANCE WITH TREATMENT: ICD-10-CM

## 2025-01-20 DIAGNOSIS — F43.23 ADJUSTMENT DISORDER WITH MIXED ANXIETY AND DEPRESSED MOOD: ICD-10-CM

## 2025-01-20 DIAGNOSIS — K75.81 NASH (NONALCOHOLIC STEATOHEPATITIS): ICD-10-CM

## 2025-01-20 DIAGNOSIS — E11.65 UNCONTROLLED TYPE 2 DIABETES MELLITUS WITH HYPERGLYCEMIA (HCC): Primary | ICD-10-CM

## 2025-01-20 DIAGNOSIS — E78.2 MIXED HYPERLIPIDEMIA: ICD-10-CM

## 2025-01-20 LAB — HBA1C MFR BLD: 14.7 %

## 2025-01-20 PROCEDURE — 99214 OFFICE O/P EST MOD 30 MIN: CPT | Performed by: NURSE PRACTITIONER

## 2025-01-20 PROCEDURE — 83036 HEMOGLOBIN GLYCOSYLATED A1C: CPT | Performed by: NURSE PRACTITIONER

## 2025-01-20 RX ORDER — INSULIN GLARGINE 100 [IU]/ML
INJECTION, SOLUTION SUBCUTANEOUS
Qty: 5 ADJUSTABLE DOSE PRE-FILLED PEN SYRINGE | Refills: 2 | Status: SHIPPED | OUTPATIENT
Start: 2025-01-20

## 2025-01-20 RX ORDER — ONDANSETRON 4 MG/1
4 TABLET, ORALLY DISINTEGRATING ORAL 3 TIMES DAILY PRN
Qty: 30 TABLET | Refills: 0 | Status: SHIPPED | OUTPATIENT
Start: 2025-01-20

## 2025-01-20 RX ORDER — ALBUTEROL SULFATE 90 UG/1
INHALANT RESPIRATORY (INHALATION)
COMMUNITY
Start: 2024-03-22

## 2025-01-20 RX ORDER — GLIPIZIDE 5 MG/1
10 TABLET ORAL
Qty: 180 TABLET | Refills: 1 | Status: SHIPPED | OUTPATIENT
Start: 2025-01-20

## 2025-01-20 RX ORDER — ASPIRIN 81 MG/1
TABLET ORAL
COMMUNITY
Start: 2024-04-10

## 2025-01-20 SDOH — ECONOMIC STABILITY: FOOD INSECURITY: WITHIN THE PAST 12 MONTHS, YOU WORRIED THAT YOUR FOOD WOULD RUN OUT BEFORE YOU GOT MONEY TO BUY MORE.: PATIENT DECLINED

## 2025-01-20 SDOH — ECONOMIC STABILITY: INCOME INSECURITY: IN THE LAST 12 MONTHS, WAS THERE A TIME WHEN YOU WERE NOT ABLE TO PAY THE MORTGAGE OR RENT ON TIME?: NO

## 2025-01-20 SDOH — ECONOMIC STABILITY: TRANSPORTATION INSECURITY
IN THE PAST 12 MONTHS, HAS LACK OF TRANSPORTATION KEPT YOU FROM MEETINGS, WORK, OR FROM GETTING THINGS NEEDED FOR DAILY LIVING?: NO

## 2025-01-20 SDOH — ECONOMIC STABILITY: FOOD INSECURITY: WITHIN THE PAST 12 MONTHS, THE FOOD YOU BOUGHT JUST DIDN'T LAST AND YOU DIDN'T HAVE MONEY TO GET MORE.: PATIENT DECLINED

## 2025-01-20 SDOH — ECONOMIC STABILITY: TRANSPORTATION INSECURITY
IN THE PAST 12 MONTHS, HAS THE LACK OF TRANSPORTATION KEPT YOU FROM MEDICAL APPOINTMENTS OR FROM GETTING MEDICATIONS?: NO

## 2025-01-20 ASSESSMENT — PATIENT HEALTH QUESTIONNAIRE - PHQ9
SUM OF ALL RESPONSES TO PHQ QUESTIONS 1-9: 0
6. FEELING BAD ABOUT YOURSELF - OR THAT YOU ARE A FAILURE OR HAVE LET YOURSELF OR YOUR FAMILY DOWN: NOT AT ALL
3. TROUBLE FALLING OR STAYING ASLEEP: NOT AT ALL
8. MOVING OR SPEAKING SO SLOWLY THAT OTHER PEOPLE COULD HAVE NOTICED. OR THE OPPOSITE, BEING SO FIGETY OR RESTLESS THAT YOU HAVE BEEN MOVING AROUND A LOT MORE THAN USUAL: NOT AT ALL
SUM OF ALL RESPONSES TO PHQ QUESTIONS 1-9: 0
5. POOR APPETITE OR OVEREATING: NOT AT ALL
2. FEELING DOWN, DEPRESSED OR HOPELESS: NOT AT ALL
7. TROUBLE CONCENTRATING ON THINGS, SUCH AS READING THE NEWSPAPER OR WATCHING TELEVISION: NOT AT ALL
10. IF YOU CHECKED OFF ANY PROBLEMS, HOW DIFFICULT HAVE THESE PROBLEMS MADE IT FOR YOU TO DO YOUR WORK, TAKE CARE OF THINGS AT HOME, OR GET ALONG WITH OTHER PEOPLE: NOT DIFFICULT AT ALL
SUM OF ALL RESPONSES TO PHQ QUESTIONS 1-9: 0
9. THOUGHTS THAT YOU WOULD BE BETTER OFF DEAD, OR OF HURTING YOURSELF: NOT AT ALL
SUM OF ALL RESPONSES TO PHQ QUESTIONS 1-9: 0
SUM OF ALL RESPONSES TO PHQ9 QUESTIONS 1 & 2: 0
1. LITTLE INTEREST OR PLEASURE IN DOING THINGS: NOT AT ALL
4. FEELING TIRED OR HAVING LITTLE ENERGY: NOT AT ALL

## 2025-01-20 ASSESSMENT — ENCOUNTER SYMPTOMS
SHORTNESS OF BREATH: 0
CHEST TIGHTNESS: 0

## 2025-01-20 NOTE — ASSESSMENT & PLAN NOTE
Repeat A1C at 14.7.    Discussed with patient that this is very critical and uncontrolled diabetes will lead to kidney failure.    Strongly advised taking medications, resume insulin.  Nausea most likely due to uncontrolled diabetes rather than insulin.    Will prescribe zofran.    Orders:    AMB POC HEMOGLOBIN A1C    Comprehensive Metabolic Panel; Future    Albumin/Creatinine Ratio, Urine; Future    Albumin/Creatinine Ratio, Urine    Comprehensive Metabolic Panel    glipiZIDE (GLUCOTROL) 5 MG tablet; Take 2 tablets by mouth 2 times daily (before meals)    insulin glargine (BASAGLAR KWIKPEN) 100 UNIT/ML injection pen; Inject 30 units under the skin daily.    Tirzepatide 2.5 MG/0.5ML SOAJ; Inject 2.5 mg into the skin every 7 days

## 2025-01-20 NOTE — PROGRESS NOTES
Chief Complaint   Patient presents with    Follow-up     \"Have you been to the ER, urgent care clinic since your last visit?  Hospitalized since your last visit?\"    NO    “Have you seen or consulted any other health care providers outside of [facility] since your last visit?”    NO        “Have you had a pap smear?”    YES - Where: [facility] Nurse/CMA to request most recent records if not in the chart    Date of last Cervical Cancer screen (HPV or PAP): 1/9/2019

## 2025-01-20 NOTE — PROGRESS NOTES
Subjective:      Patient ID: Leila Erazo is a 39 y.o. female     HPI  3 month follow up uncontrolled chronic conditions.   T2DM.  Uncontrolled.  Last A1C at 13.5.  Had stopped taking meds when rx ran out.  Refilled at last appointment.  Still has not resumed insulin due to nausea. Not taking glipizide. Taking metformin only. Was on trulicity in past but states insurance no longer covered med.  Hypercholesterolemia on atorvastatin.  Reports she follows a healthy diet.  Active at work as a caregiver.  Trigs very elevated with last labs.  Fenofibrate prescribed.  Declined taking med.   HTN.  Taking lisinopril.    History of SALGADO and GERD followed by GI.  No longer on PPI.   History of anxiety/depression on zoloft.      Patient Active Problem List   Diagnosis    Primary osteoarthritis of right knee    Uncontrolled type 2 diabetes mellitus with hyperglycemia (HCC)    Obesity, morbid    Gallstones    SALGADO (nonalcoholic steatohepatitis)    Vitamin D deficiency    Gastroesophageal reflux disease without esophagitis    Food insecurity    Adjustment disorder with mixed anxiety and depressed mood     Current Outpatient Medications   Medication Sig    albuterol sulfate HFA (PROVENTIL;VENTOLIN;PROAIR) 108 (90 Base) MCG/ACT inhaler EVERY 4 HOURS AS NEEDED as needed for Shob as needed for Shob    aspirin 81 MG EC tablet Take by mouth    atorvastatin (LIPITOR) 40 MG tablet Take 1 tablet by mouth daily    lisinopril (PRINIVIL;ZESTRIL) 20 MG tablet Take 1 tablet by mouth daily    metFORMIN (GLUCOPHAGE) 500 MG tablet Take 2 tablets by mouth 2 times daily (with meals)    sertraline (ZOLOFT) 50 MG tablet Take 1 tablet by mouth daily    glucose monitoring kit 1 kit by Does not apply route daily    Insulin Pen Needle (BD PEN NEEDLE SUKHI 2ND GEN) 32G X 4 MM MISC 1 each by Does not apply route daily    Lancets MISC     glipiZIDE (GLUCOTROL) 5 MG tablet Take 2 tablets by mouth 2 times daily (before meals)    insulin glargine

## 2025-01-21 DIAGNOSIS — E78.2 MIXED HYPERLIPIDEMIA: ICD-10-CM

## 2025-01-21 LAB
ALBUMIN SERPL-MCNC: 3.9 G/DL (ref 3.9–4.9)
ALBUMIN/CREAT UR: 26 MG/G CREAT (ref 0–29)
ALP SERPL-CCNC: 159 IU/L (ref 44–121)
ALT SERPL-CCNC: 11 IU/L (ref 0–32)
AST SERPL-CCNC: 10 IU/L (ref 0–40)
BILIRUB SERPL-MCNC: <0.2 MG/DL (ref 0–1.2)
BUN SERPL-MCNC: 11 MG/DL (ref 6–20)
BUN/CREAT SERPL: 19 (ref 9–23)
CALCIUM SERPL-MCNC: 10 MG/DL (ref 8.7–10.2)
CHLORIDE SERPL-SCNC: 96 MMOL/L (ref 96–106)
CHOLEST SERPL-MCNC: 265 MG/DL (ref 100–199)
CO2 SERPL-SCNC: 23 MMOL/L (ref 20–29)
CREAT SERPL-MCNC: 0.57 MG/DL (ref 0.57–1)
CREAT UR-MCNC: 45.6 MG/DL
EGFRCR SERPLBLD CKD-EPI 2021: 120 ML/MIN/1.73
GLOBULIN SER CALC-MCNC: 2.7 G/DL (ref 1.5–4.5)
GLUCOSE SERPL-MCNC: 404 MG/DL (ref 70–99)
HDLC SERPL-MCNC: 33 MG/DL
IMP & REVIEW OF LAB RESULTS: NORMAL
LDLC SERPL CALC-MCNC: 106 MG/DL (ref 0–99)
MICROALBUMIN UR-MCNC: 11.9 UG/ML
POTASSIUM SERPL-SCNC: 4.5 MMOL/L (ref 3.5–5.2)
PROT SERPL-MCNC: 6.6 G/DL (ref 6–8.5)
SODIUM SERPL-SCNC: 134 MMOL/L (ref 134–144)
TRIGL SERPL-MCNC: 722 MG/DL (ref 0–149)
VLDLC SERPL CALC-MCNC: 126 MG/DL (ref 5–40)

## 2025-01-21 RX ORDER — FENOFIBRATE 160 MG/1
160 TABLET ORAL DAILY
Qty: 90 TABLET | Refills: 1 | Status: SHIPPED | OUTPATIENT
Start: 2025-01-21

## 2025-01-30 PROBLEM — I65.23 BILATERAL CAROTID ARTERY STENOSIS: Status: ACTIVE | Noted: 2025-01-30

## 2025-01-30 PROBLEM — R94.39 ABNORMAL CARDIOVASCULAR STRESS TEST: Status: ACTIVE | Noted: 2025-01-30

## 2025-05-01 ENCOUNTER — OFFICE VISIT (OUTPATIENT)
Age: 40
End: 2025-05-01
Payer: MEDICAID

## 2025-05-01 VITALS
TEMPERATURE: 97.3 F | HEIGHT: 62 IN | BODY MASS INDEX: 53.92 KG/M2 | RESPIRATION RATE: 16 BRPM | DIASTOLIC BLOOD PRESSURE: 85 MMHG | SYSTOLIC BLOOD PRESSURE: 133 MMHG | WEIGHT: 293 LBS | OXYGEN SATURATION: 94 % | HEART RATE: 101 BPM

## 2025-05-01 DIAGNOSIS — K21.9 GASTROESOPHAGEAL REFLUX DISEASE WITHOUT ESOPHAGITIS: ICD-10-CM

## 2025-05-01 DIAGNOSIS — E78.2 MIXED HYPERLIPIDEMIA: ICD-10-CM

## 2025-05-01 DIAGNOSIS — F43.23 ADJUSTMENT DISORDER WITH MIXED ANXIETY AND DEPRESSED MOOD: ICD-10-CM

## 2025-05-01 DIAGNOSIS — I10 PRIMARY HYPERTENSION: ICD-10-CM

## 2025-05-01 DIAGNOSIS — E66.01 OBESITY, MORBID (HCC): ICD-10-CM

## 2025-05-01 DIAGNOSIS — K75.81 NASH (NONALCOHOLIC STEATOHEPATITIS): ICD-10-CM

## 2025-05-01 DIAGNOSIS — E11.65 UNCONTROLLED TYPE 2 DIABETES MELLITUS WITH HYPERGLYCEMIA (HCC): Primary | ICD-10-CM

## 2025-05-01 PROCEDURE — 3046F HEMOGLOBIN A1C LEVEL >9.0%: CPT | Performed by: NURSE PRACTITIONER

## 2025-05-01 PROCEDURE — 3075F SYST BP GE 130 - 139MM HG: CPT | Performed by: NURSE PRACTITIONER

## 2025-05-01 PROCEDURE — 99214 OFFICE O/P EST MOD 30 MIN: CPT | Performed by: NURSE PRACTITIONER

## 2025-05-01 PROCEDURE — 3079F DIAST BP 80-89 MM HG: CPT | Performed by: NURSE PRACTITIONER

## 2025-05-01 RX ORDER — LISINOPRIL 20 MG/1
20 TABLET ORAL DAILY
Qty: 90 TABLET | Refills: 1 | Status: SHIPPED | OUTPATIENT
Start: 2025-05-01

## 2025-05-01 RX ORDER — FENOFIBRATE 160 MG/1
160 TABLET ORAL DAILY
Qty: 90 TABLET | Refills: 1 | Status: SHIPPED | OUTPATIENT
Start: 2025-05-01

## 2025-05-01 RX ORDER — ATORVASTATIN CALCIUM 40 MG/1
40 TABLET, FILM COATED ORAL DAILY
Qty: 90 TABLET | Refills: 1 | Status: SHIPPED | OUTPATIENT
Start: 2025-05-01

## 2025-05-01 ASSESSMENT — ENCOUNTER SYMPTOMS
CHEST TIGHTNESS: 0
SHORTNESS OF BREATH: 0

## 2025-05-01 NOTE — PROGRESS NOTES
Chief Complaint   Patient presents with    New Patient     Pt establishing care with QUEENIE Gracia          \"Have you been to the ER, urgent care clinic since your last visit?  Hospitalized since your last visit?\"    NO    “Have you seen or consulted any other health care providers outside of [facility] since your last visit?”    NO     “Have you had a pap smear?”    YES - Where: [facility]  Nurse/CMA to request most recent records if not in the chart    Date of last Cervical Cancer screen (HPV or PAP): 1/9/2019             Click Here for Release of Records Request           1/20/2025    10:09 AM   PHQ-9    Little interest or pleasure in doing things 0   Feeling down, depressed, or hopeless 0   Trouble falling or staying asleep, or sleeping too much 0   Feeling tired or having little energy 0   Poor appetite or overeating 0   Feeling bad about yourself - or that you are a failure or have let yourself or your family down 0   Trouble concentrating on things, such as reading the newspaper or watching television 0   Moving or speaking so slowly that other people could have noticed. Or the opposite - being so fidgety or restless that you have been moving around a lot more than usual 0   Thoughts that you would be better off dead, or of hurting yourself in some way 0   PHQ-2 Score 0   PHQ-9 Total Score 0   If you checked off any problems, how difficult have these problems made it for you to do your work, take care of things at home, or get along with other people? 0           Financial Resource Strain: Low Risk  (10/3/2024)    Overall Financial Resource Strain (CARDIA)     Difficulty of Paying Living Expenses: Not hard at all      Food Insecurity: Patient Declined (1/20/2025)    Hunger Vital Sign     Worried About Running Out of Food in the Last Year: Patient declined     Ran Out of Food in the Last Year: Patient declined          Health Maintenance Due   Topic Date Due    Cervical

## 2025-05-01 NOTE — ASSESSMENT & PLAN NOTE
Reviewed above normal BMI with patient.  The following interventions were recommended: reduced calorie diet, nutritional guidance and counseling given, exercise encouragement and weight monitoring.   Trial tirzepatide pending insurance coverage.

## 2025-05-01 NOTE — PROGRESS NOTES
Subjective:      Patient ID: Leila Erazo is a 39 y.o. female     HPI  Follow up chronic conditions.   T2DM.  Uncontrolled.  Last A1C at 14.7.  Had stopped taking meds when rx ran out.  Refilled at last appointment.  Has resumed insulin, glipizide and metformin.  Rx sent for mounjaro.  Reports she never received med.  Did not notify office.    Hypercholesterolemia on atorvastatin.  Reports she follows a healthy diet.  Active at work as a caregiver.  Trigs very elevated with last labs at 722. Fenofibrate prescribed. Reports she never received rx.    HTN.  Taking lisinopril.    History of SALGADO and GERD followed by GI.  No longer on PPI.   History of anxiety/depression on zoloft.    Obesity.  Has struggled with weight loss for years.  Previously lost some weight on trulicity in the past.  Insurance stopped coverage.       Patient Active Problem List   Diagnosis    Primary osteoarthritis of right knee    Uncontrolled type 2 diabetes mellitus with hyperglycemia (HCC)    Obesity, morbid (HCC)    Gallstones    SALGADO (nonalcoholic steatohepatitis)    Vitamin D deficiency    Gastroesophageal reflux disease without esophagitis    Food insecurity    Adjustment disorder with mixed anxiety and depressed mood    Bilateral carotid artery stenosis    Abnormal cardiovascular stress test    Mixed hyperlipidemia    Primary hypertension     Current Outpatient Medications   Medication Sig    atorvastatin (LIPITOR) 40 MG tablet Take 1 tablet by mouth daily    fenofibrate 160 MG tablet Take 1 tablet by mouth daily    lisinopril (PRINIVIL;ZESTRIL) 20 MG tablet Take 1 tablet by mouth daily    metFORMIN (GLUCOPHAGE) 500 MG tablet Take 2 tablets by mouth 2 times daily (with meals)    Tirzepatide (MOUNJARO) 2.5 MG/0.5ML SOAJ pen Inject 2.5 mg into the skin every 7 days    albuterol sulfate HFA (PROVENTIL;VENTOLIN;PROAIR) 108 (90 Base) MCG/ACT inhaler EVERY 4 HOURS AS NEEDED as needed for Shob as needed for Shob    glipiZIDE (GLUCOTROL)

## 2025-05-01 NOTE — ASSESSMENT & PLAN NOTE
Uncontrolled based on last FLP.  Strongly advised compliance with medication regimen.  Continue statin.  Rx resent for fenofibrate.  Orders:    Comprehensive Metabolic Panel; Future    Lipid Panel; Future    atorvastatin (LIPITOR) 40 MG tablet; Take 1 tablet by mouth daily    fenofibrate 160 MG tablet; Take 1 tablet by mouth daily

## 2025-05-01 NOTE — ASSESSMENT & PLAN NOTE
Stable with last labs.  Weight loss recommendations given.  Orders:    Comprehensive Metabolic Panel; Future

## 2025-05-01 NOTE — ASSESSMENT & PLAN NOTE
Controlled.  Continue current treatment.  Orders:    lisinopril (PRINIVIL;ZESTRIL) 20 MG tablet; Take 1 tablet by mouth daily

## 2025-05-01 NOTE — ASSESSMENT & PLAN NOTE
Uncontrolled based  on last A1c.  Strongly advised notifying provider if medications are unable to be obtained.  Continue current treatment, refills sent.  Will resend prescription to start tirzepatide.  Reviewed diabetic diet and carbohydrate restriction.    Orders:    Hemoglobin A1C; Future    Comprehensive Metabolic Panel; Future    metFORMIN (GLUCOPHAGE) 500 MG tablet; Take 2 tablets by mouth 2 times daily (with meals)    Tirzepatide (MOUNJARO) 2.5 MG/0.5ML SOAJ pen; Inject 2.5 mg into the skin every 7 days

## 2025-05-02 ENCOUNTER — RESULTS FOLLOW-UP (OUTPATIENT)
Age: 40
End: 2025-05-02

## 2025-05-02 ENCOUNTER — TELEPHONE (OUTPATIENT)
Age: 40
End: 2025-05-02

## 2025-05-02 DIAGNOSIS — E11.65 UNCONTROLLED TYPE 2 DIABETES MELLITUS WITH HYPERGLYCEMIA (HCC): ICD-10-CM

## 2025-05-02 LAB
ALBUMIN SERPL-MCNC: 4.2 G/DL (ref 3.9–4.9)
ALP SERPL-CCNC: 122 IU/L (ref 44–121)
ALT SERPL-CCNC: 11 IU/L (ref 0–32)
AST SERPL-CCNC: 12 IU/L (ref 0–40)
BILIRUB SERPL-MCNC: 0.2 MG/DL (ref 0–1.2)
BUN SERPL-MCNC: 9 MG/DL (ref 6–20)
BUN/CREAT SERPL: 15 (ref 9–23)
CALCIUM SERPL-MCNC: 9.8 MG/DL (ref 8.7–10.2)
CHLORIDE SERPL-SCNC: 98 MMOL/L (ref 96–106)
CHOLEST SERPL-MCNC: 287 MG/DL (ref 100–199)
CO2 SERPL-SCNC: 20 MMOL/L (ref 20–29)
CREAT SERPL-MCNC: 0.61 MG/DL (ref 0.57–1)
EGFRCR SERPLBLD CKD-EPI 2021: 118 ML/MIN/1.73
GLOBULIN SER CALC-MCNC: 2.8 G/DL (ref 1.5–4.5)
GLUCOSE SERPL-MCNC: 407 MG/DL (ref 70–99)
HBA1C MFR BLD: 14.4 % (ref 4.8–5.6)
HDLC SERPL-MCNC: 36 MG/DL
IMP & REVIEW OF LAB RESULTS: NORMAL
LDLC SERPL CALC-MCNC: 114 MG/DL (ref 0–99)
POTASSIUM SERPL-SCNC: 4.6 MMOL/L (ref 3.5–5.2)
PROT SERPL-MCNC: 7 G/DL (ref 6–8.5)
SODIUM SERPL-SCNC: 135 MMOL/L (ref 134–144)
TRIGL SERPL-MCNC: 759 MG/DL (ref 0–149)
VLDLC SERPL CALC-MCNC: 137 MG/DL (ref 5–40)

## 2025-05-02 RX ORDER — INSULIN GLARGINE 100 [IU]/ML
INJECTION, SOLUTION SUBCUTANEOUS
Qty: 5 ADJUSTABLE DOSE PRE-FILLED PEN SYRINGE | Refills: 2 | Status: SHIPPED | OUTPATIENT
Start: 2025-05-02

## 2025-05-02 NOTE — TELEPHONE ENCOUNTER
**[facility] with [facility]**    Attempt made to contact patient at the mobile number.    Spoke to patient at mobile number and advised them of the previous message.    Patient verified understanding and scheduled a in person appointment .  Appointment scheduled for 5/22/25 at 10:00 am.    Lanny Torres [facility]   Ambulatory Pharmacy Technician Clinical   [phone]  Department, toll free: [phone], option 2       For Pharmacy Admin Tracking Only    Program: Medical Group  Recommendation Provided To: Patient/Caregiver: 1 via Telephone  Intervention Detail: Scheduled Appointment  Intervention Accepted By: Patient/Caregiver: 1  Gap Closed?: Yes   Time Spent (min): 10

## 2025-05-02 NOTE — TELEPHONE ENCOUNTER
Reason for referral: DM  Referring provider: EDY CAMPBELL   Referring provider office: [facility]  Referred to: Erika Siu, PharmD, BCGP, BCACP  Status of Patient: New Patient  Length of Appt: 60 minutes  Type of Appt: In Person   Patient need address: No

## 2025-05-05 DIAGNOSIS — E11.65 UNCONTROLLED TYPE 2 DIABETES MELLITUS WITH HYPERGLYCEMIA (HCC): Primary | ICD-10-CM

## 2025-06-05 ENCOUNTER — PHARMACY VISIT (OUTPATIENT)
Age: 40
End: 2025-06-05

## 2025-06-05 VITALS — BODY MASS INDEX: 55.02 KG/M2 | WEIGHT: 293 LBS

## 2025-06-05 DIAGNOSIS — E11.65 UNCONTROLLED TYPE 2 DIABETES MELLITUS WITH HYPERGLYCEMIA (HCC): Primary | ICD-10-CM

## 2025-06-05 NOTE — PATIENT INSTRUCTIONS
Your A1c was 14.4% which was uncontrolled.    START Trulicity 0.75 mg weekly  Continue all other medications the same    Blood Sugar Goal  Fastin-130 mg/dL  1-2 after meals: Less than 180 mg/dL    Carbohydrate Goals  Meal: 30-45 grams   Snacks: 15 grams    Pharmacist Contact Information:  Dayana PatelD, BCGP, BCACP  Work Phone: [phone] (option #2)

## 2025-06-05 NOTE — PROGRESS NOTES
Medications   Medication Sig    dulaglutide (TRULICITY) 0.75 MG/0.5ML SOAJ SC injection Inject 0.5 mLs into the skin every 7 days    insulin glargine (BASAGLAR KWIKPEN) 100 UNIT/ML injection pen Inject 40 units under the skin daily.    atorvastatin (LIPITOR) 40 MG tablet Take 1 tablet by mouth daily    fenofibrate 160 MG tablet Take 1 tablet by mouth daily    lisinopril (PRINIVIL;ZESTRIL) 20 MG tablet Take 1 tablet by mouth daily    metFORMIN (GLUCOPHAGE) 500 MG tablet Take 2 tablets by mouth 2 times daily (with meals)    Tirzepatide (MOUNJARO) 2.5 MG/0.5ML SOAJ pen Inject 2.5 mg into the skin every 7 days    albuterol sulfate HFA (PROVENTIL;VENTOLIN;PROAIR) 108 (90 Base) MCG/ACT inhaler EVERY 4 HOURS AS NEEDED as needed for Shob as needed for Shob    aspirin 81 MG EC tablet Take by mouth (Patient not taking: Reported on 5/1/2025)    glipiZIDE (GLUCOTROL) 5 MG tablet Take 2 tablets by mouth 2 times daily (before meals)    ondansetron (ZOFRAN-ODT) 4 MG disintegrating tablet Take 1 tablet by mouth 3 times daily as needed for Nausea or Vomiting    sertraline (ZOLOFT) 50 MG tablet Take 1 tablet by mouth daily    glucose monitoring kit 1 kit by Does not apply route daily    Insulin Pen Needle (BD PEN NEEDLE SUKHI 2ND GEN) 32G X 4 MM MISC 1 each by Does not apply route daily    Lancets MISC      No current facility-administered medications for this visit.     Lab Results   Component Value Date/Time     05/01/2025 12:00 AM    K 4.6 05/01/2025 12:00 AM    CL 98 05/01/2025 12:00 AM    CO2 20 05/01/2025 12:00 AM    BUN 9 05/01/2025 12:00 AM    GFRAA >60 08/23/2022 02:51 PM    GLOB 3.6 10/03/2024 12:00 PM    ALT 11 05/01/2025 12:00 AM     Lab Results   Component Value Date/Time    CHOL 287 05/01/2025 12:00 AM    HDL 36 05/01/2025 12:00 AM     05/01/2025 12:00 AM    LDL 46.2 01/19/2024 10:48 AM    VLDL 137 05/01/2025 12:00 AM    VLDL 80 09/16/2020 12:09 PM     Lab Results   Component Value Date/Time    WBC 8.6

## 2025-06-30 ENCOUNTER — PHARMACY VISIT (OUTPATIENT)
Age: 40
End: 2025-06-30

## 2025-06-30 DIAGNOSIS — E11.65 UNCONTROLLED TYPE 2 DIABETES MELLITUS WITH HYPERGLYCEMIA (HCC): Primary | ICD-10-CM

## 2025-06-30 RX ORDER — GLIPIZIDE 5 MG/1
10 TABLET ORAL
Qty: 180 TABLET | Refills: 1 | Status: SHIPPED | OUTPATIENT
Start: 2025-06-30 | End: 2025-06-30 | Stop reason: SDUPTHER

## 2025-06-30 RX ORDER — INSULIN GLARGINE 100 [IU]/ML
INJECTION, SOLUTION SUBCUTANEOUS
Qty: 5 ADJUSTABLE DOSE PRE-FILLED PEN SYRINGE | Refills: 2 | Status: SHIPPED | OUTPATIENT
Start: 2025-06-30

## 2025-06-30 RX ORDER — PEN NEEDLE, DIABETIC 32GX 5/32"
1 NEEDLE, DISPOSABLE MISCELLANEOUS DAILY
Qty: 100 EACH | Refills: 3 | Status: SHIPPED | OUTPATIENT
Start: 2025-06-30

## 2025-06-30 RX ORDER — GLIPIZIDE 10 MG/1
10 TABLET ORAL 2 TIMES DAILY
Qty: 180 TABLET | Refills: 1 | Status: SHIPPED | OUTPATIENT
Start: 2025-06-30

## 2025-06-30 NOTE — PATIENT INSTRUCTIONS
Your A1c was 14.4% which was uncontrolled.    INCREASE Trulicity to 1.5 mg weekly  RESTART Glipizide 10 mg 1 tablet twice daily  Continue Basaglar 30 units daily  Continue Metformin  mg 2 tablets twice daily    Blood Sugar Goal  Fastin-130 mg/dL  1-2 after meals: Less than 180 mg/dL    Carbohydrate Goals  Meal: 30-45 grams   Snacks: 15 grams    Pharmacist Contact Information:  Erika Siu, PharmD, BCGP, BCACP  Work Phone: [phone] (option #2)

## 2025-06-30 NOTE — PROGRESS NOTES
Pharmacy Progress Note - Diabetes Management    S/O: Ms. Leila Erazo is a 39 y.o. female, referred by Nila Lawson APRN - NP,  has a past medical history of Anxiety, Diabetes (HCC), Gallstones, History of chickenpox, Hyperlipidemia, Hypertension, Major depressive disorder in full remission, SALGADO (nonalcoholic steatohepatitis), Obesity, and Type 2 diabetes mellitus without complication (HCC)..  Pt was seen today for diabetes management.  Patient's last A1c was:   Hemoglobin A1C   Date Value Ref Range Status   05/01/2025 14.4 (H) 4.8 - 5.6 % Final     Comment:                 Prediabetes: 5.7 - 6.4           Diabetes: >6.4           Glycemic control for adults with diabetes: <7.0       Hemoglobin A1C, POC   Date Value Ref Range Status   01/20/2025 14.7 % Final       Subjective      Interim Update: Pt was last seen by this writer on 6/5/25 for f/up on DM management.  She had not yet restarted Trulicity.  Pt has been working hard on nonpharm interventions to decrease weight in order to get pregnant and have a safer pregnancy.    Pt arrives to clinic today and statess he used her last pen of Trulicity 0.75 mg on Thursday.  Denies side effects.  Notes that she feels naqvi quicker.  Notes having a smaller appetite.    Reports not having Glipizide for a week.  Requests refill for pen needles.  During med rec, pt states she has been doing 30 units of Basaglar instead of 40 units per records.  She has been doing 30 units the whole time.  Not a recent change.    Reviewed BG and A1c goals.      Current anti-hyperglycemic regimen includes:    Key Antihyperglycemic Medications              dulaglutide (TRULICITY) 0.75 MG/0.5ML SOAJ SC injection Inject 0.5 mLs into the skin every 7 days    insulin glargine (BASAGLAR KWIKPEN) 100 UNIT/ML injection pen Inject 40 units under the skin daily.    metFORMIN (GLUCOPHAGE) 500 MG tablet Take 2 tablets by mouth 2 times daily (with meals)    glipiZIDE (GLUCOTROL) 5 MG

## 2025-07-01 VITALS — BODY MASS INDEX: 55.49 KG/M2 | WEIGHT: 293 LBS

## 2025-07-25 ENCOUNTER — PHARMACY VISIT (OUTPATIENT)
Age: 40
End: 2025-07-25

## 2025-07-25 VITALS — BODY MASS INDEX: 56.63 KG/M2 | WEIGHT: 293 LBS

## 2025-07-25 DIAGNOSIS — E11.65 UNCONTROLLED TYPE 2 DIABETES MELLITUS WITH HYPERGLYCEMIA (HCC): Primary | ICD-10-CM

## 2025-07-25 RX ORDER — BLOOD-GLUCOSE METER
KIT MISCELLANEOUS
Qty: 1 KIT | Refills: 0 | Status: SHIPPED | OUTPATIENT
Start: 2025-07-25

## 2025-07-25 RX ORDER — LANCING DEVICE
EACH MISCELLANEOUS
Qty: 1 EACH | Refills: 0 | Status: SHIPPED | OUTPATIENT
Start: 2025-07-25

## 2025-07-25 RX ORDER — AVOBENZONE, HOMOSALATE, OCTISALATE, OCTOCRYLENE 30; 40; 45; 26 MG/ML; MG/ML; MG/ML; MG/ML
CREAM TOPICAL
Qty: 100 EACH | Refills: 3 | Status: SHIPPED | OUTPATIENT
Start: 2025-07-25

## 2025-07-25 RX ORDER — GLUCOSAMINE HCL/CHONDROITIN SU 500-400 MG
CAPSULE ORAL
Qty: 100 STRIP | Refills: 3 | Status: SHIPPED | OUTPATIENT
Start: 2025-07-25

## 2025-07-25 NOTE — PATIENT INSTRUCTIONS
Your A1c was 14.4% which was uncontrolled.    INCREASE Trulicity to 3 mg weekly  Continue current regimen  I discontinued Glipizide from your med list due to your nausea/vomiting.    Blood Sugar Goal  Fastin-130 mg/dL  1-2 after meals: Less than 180 mg/dL    Carbohydrate Goals  Meal: 30-45 grams   Snacks: 15 grams    Pharmacist Contact Information:  Dayana PatelD, BCGP, BCACP  Work Phone: [phone] (option #2)

## 2025-07-25 NOTE — PROGRESS NOTES
Pharmacy Progress Note - Diabetes Management    S/O: Ms. Leila Erazo is a 39 y.o. female, referred by Nila Lawson APRN - NP,  has a past medical history of Anxiety, Diabetes (HCC), Gallstones, History of chickenpox, Hyperlipidemia, Hypertension, Major depressive disorder in full remission, SALGADO (nonalcoholic steatohepatitis), Obesity, and Type 2 diabetes mellitus without complication (HCC)..  Pt was seen today for diabetes management.  Patient's last A1c was:   Hemoglobin A1C   Date Value Ref Range Status   05/01/2025 14.4 (H) 4.8 - 5.6 % Final     Comment:                 Prediabetes: 5.7 - 6.4           Diabetes: >6.4           Glycemic control for adults with diabetes: <7.0       Hemoglobin A1C, POC   Date Value Ref Range Status   01/20/2025 14.7 % Final       Subjective      Interim Update: Pt was last seen by this writer on 6/30/25 for f/up on DM management.  Pt was advised to restart Glipizide and increase Trulicity to 1.5 mg weekly.  Refills for pen needles and Glipizide were provided.      Pt arrives to clinic today to f/up on dose changes and states she had restarted Glipizide and increased Trulicity dose.  She stopped Glipizide around 7/10/25 as she was experiencing vomiting.  After stopping Glipizide vomiting resolved.  She had continued Trulicity during this.  She has completed 3 doses of Trulicity and denies further side effects after stopping Glipizide.  Notes appetite is significantly reduced but this is no different than 0.75 mg dose.  Reports eating half the amount she typically would during meals.  No longer craving sugary snack cakes.  She also reports improved energy levels.  Was typically taking a nap after eating, now she has energy to clean home after a meal.      Current anti-hyperglycemic regimen includes:    Key Antihyperglycemic Medications              insulin glargine (BASAGLAR KWIKPEN) 100 UNIT/ML injection pen Inject 30 units under the skin daily.    dulaglutide

## 2025-08-28 ENCOUNTER — TELEPHONE (OUTPATIENT)
Age: 40
End: 2025-08-28